# Patient Record
Sex: MALE | Race: WHITE | Employment: FULL TIME | URBAN - METROPOLITAN AREA
[De-identification: names, ages, dates, MRNs, and addresses within clinical notes are randomized per-mention and may not be internally consistent; named-entity substitution may affect disease eponyms.]

---

## 2017-02-14 ENCOUNTER — TELEPHONE (OUTPATIENT)
Dept: FAMILY MEDICINE CLINIC | Age: 52
End: 2017-02-14

## 2017-02-14 DIAGNOSIS — R91.1 NODULE OF RIGHT LUNG: Primary | ICD-10-CM

## 2017-03-30 ENCOUNTER — HOSPITAL ENCOUNTER (OUTPATIENT)
Dept: ULTRASOUND IMAGING | Age: 52
Discharge: HOME | End: 2017-03-30
Admitting: UROLOGY

## 2017-03-30 DIAGNOSIS — N20.1 CALCULUS OF URETER: ICD-10-CM

## 2017-05-16 ENCOUNTER — HOSPITAL ENCOUNTER (OUTPATIENT)
Dept: CT IMAGING | Age: 52
Discharge: HOME | End: 2017-05-16
Attending: FAMILY MEDICINE | Admitting: FAMILY MEDICINE

## 2017-05-16 DIAGNOSIS — R91.1 SOLITARY PULMONARY NODULE: ICD-10-CM

## 2017-05-16 DIAGNOSIS — R91.1 NODULE OF RIGHT LUNG: ICD-10-CM

## 2017-05-16 DIAGNOSIS — R91.1 NODULE OF RIGHT LUNG: Primary | ICD-10-CM

## 2017-05-19 ENCOUNTER — TELEPHONE (OUTPATIENT)
Dept: PULMONOLOGY | Age: 52
End: 2017-05-19

## 2017-05-19 ENCOUNTER — OFFICE VISIT (OUTPATIENT)
Dept: FAMILY MEDICINE CLINIC | Age: 52
End: 2017-05-19

## 2017-05-19 VITALS
HEIGHT: 70 IN | BODY MASS INDEX: 25.77 KG/M2 | WEIGHT: 180 LBS | SYSTOLIC BLOOD PRESSURE: 132 MMHG | HEART RATE: 88 BPM | DIASTOLIC BLOOD PRESSURE: 86 MMHG

## 2017-05-19 DIAGNOSIS — R91.8 PULMONARY NODULES/LESIONS, MULTIPLE: Primary | ICD-10-CM

## 2017-05-19 PROCEDURE — 99214 OFFICE O/P EST MOD 30 MIN: CPT | Performed by: FAMILY MEDICINE

## 2017-05-22 ENCOUNTER — TELEPHONE (OUTPATIENT)
Dept: FAMILY MEDICINE CLINIC | Age: 52
End: 2017-05-22

## 2017-05-23 ENCOUNTER — NURSE ONLY (OUTPATIENT)
Dept: FAMILY MEDICINE CLINIC | Age: 52
End: 2017-05-23

## 2017-05-23 ENCOUNTER — HOSPITAL ENCOUNTER (OUTPATIENT)
Dept: OTHER | Age: 52
Discharge: HOME | End: 2017-05-23
Attending: FAMILY MEDICINE | Admitting: FAMILY MEDICINE

## 2017-05-23 VITALS — HEIGHT: 68 IN | WEIGHT: 180 LBS | BODY MASS INDEX: 27.28 KG/M2

## 2017-05-23 DIAGNOSIS — R59.0 THORACIC LYMPHADENOPATHY: ICD-10-CM

## 2017-05-23 DIAGNOSIS — R91.1 NODULE OF RIGHT LUNG: ICD-10-CM

## 2017-05-23 DIAGNOSIS — R59.0 THORACIC LYMPHADENOPATHY: Primary | ICD-10-CM

## 2017-05-23 LAB
BASOPHILS ABSOLUTE: 0.1 K/UL (ref 0–0.2)
BASOPHILS RELATIVE PERCENT: 1.2 %
EOSINOPHILS ABSOLUTE: 0.1 K/UL (ref 0–0.6)
EOSINOPHILS RELATIVE PERCENT: 1.6 %
HCT VFR BLD CALC: 45.5 % (ref 40.5–52.5)
HEMOGLOBIN: 15 G/DL (ref 13.5–17.5)
LACTATE DEHYDROGENASE: 245 U/L (ref 100–190)
LYMPHOCYTES ABSOLUTE: 1.1 K/UL (ref 1–5.1)
LYMPHOCYTES RELATIVE PERCENT: 19 %
MCH RBC QN AUTO: 28.6 PG (ref 26–34)
MCHC RBC AUTO-ENTMCNC: 32.9 G/DL (ref 31–36)
MCV RBC AUTO: 86.8 FL (ref 80–100)
MONOCYTES ABSOLUTE: 0.5 K/UL (ref 0–1.3)
MONOCYTES RELATIVE PERCENT: 8.3 %
NEUTROPHILS ABSOLUTE: 4.1 K/UL (ref 1.7–7.7)
NEUTROPHILS RELATIVE PERCENT: 69.9 %
PDW BLD-RTO: 13.6 % (ref 12.4–15.4)
PLATELET # BLD: 243 K/UL (ref 135–450)
PMV BLD AUTO: 8.8 FL (ref 5–10.5)
RBC # BLD: 5.24 M/UL (ref 4.2–5.9)
SEDIMENTATION RATE, ERYTHROCYTE: 15 MM/HR (ref 0–15)
WBC # BLD: 5.9 K/UL (ref 4–11)

## 2017-05-23 RX ORDER — FLUDEOXYGLUCOSE F 18 200 MCI/ML
15.55 INJECTION, SOLUTION INTRAVENOUS
Status: COMPLETED | OUTPATIENT
Start: 2017-05-23 | End: 2017-05-23

## 2017-05-23 RX ADMIN — FLUDEOXYGLUCOSE F 18 15.55 MILLICURIE: 200 INJECTION, SOLUTION INTRAVENOUS at 08:55

## 2017-05-24 ENCOUNTER — OFFICE VISIT (OUTPATIENT)
Dept: PULMONOLOGY | Age: 52
End: 2017-05-24

## 2017-05-24 VITALS
HEIGHT: 68 IN | BODY MASS INDEX: 27.13 KG/M2 | RESPIRATION RATE: 16 BRPM | OXYGEN SATURATION: 94 % | WEIGHT: 179 LBS | SYSTOLIC BLOOD PRESSURE: 124 MMHG | DIASTOLIC BLOOD PRESSURE: 82 MMHG | TEMPERATURE: 98.2 F | HEART RATE: 83 BPM

## 2017-05-24 DIAGNOSIS — R59.0 MEDIASTINAL ADENOPATHY: ICD-10-CM

## 2017-05-24 DIAGNOSIS — R91.1 PULMONARY NODULE: Primary | ICD-10-CM

## 2017-05-24 PROCEDURE — 99245 OFF/OP CONSLTJ NEW/EST HI 55: CPT | Performed by: INTERNAL MEDICINE

## 2017-05-25 ENCOUNTER — SURG/PROC ORDERS (OUTPATIENT)
Dept: ANESTHESIOLOGY | Age: 52
End: 2017-05-25

## 2017-05-25 ENCOUNTER — PAT TELEPHONE (OUTPATIENT)
Dept: PREADMISSION TESTING | Age: 52
End: 2017-05-25

## 2017-05-25 VITALS — BODY MASS INDEX: 27.13 KG/M2 | HEIGHT: 68 IN | WEIGHT: 179 LBS

## 2017-05-25 LAB — ANGIOTENSIN CONVERTING ENZYME: 29 U/L (ref 9–67)

## 2017-05-25 RX ORDER — SODIUM CHLORIDE 0.9 % (FLUSH) 0.9 %
10 SYRINGE (ML) INJECTION EVERY 12 HOURS SCHEDULED
Status: CANCELLED | OUTPATIENT
Start: 2017-05-25

## 2017-05-25 RX ORDER — SODIUM CHLORIDE 0.9 % (FLUSH) 0.9 %
10 SYRINGE (ML) INJECTION PRN
Status: CANCELLED | OUTPATIENT
Start: 2017-05-25

## 2017-05-25 RX ORDER — SODIUM CHLORIDE 9 MG/ML
INJECTION, SOLUTION INTRAVENOUS CONTINUOUS
Status: CANCELLED | OUTPATIENT
Start: 2017-05-25

## 2017-05-26 ENCOUNTER — HOSPITAL ENCOUNTER (OUTPATIENT)
Dept: ENDOSCOPY | Age: 52
Discharge: HOME | End: 2017-05-26
Attending: INTERNAL MEDICINE | Admitting: INTERNAL MEDICINE

## 2017-05-26 VITALS
DIASTOLIC BLOOD PRESSURE: 83 MMHG | RESPIRATION RATE: 19 BRPM | WEIGHT: 174.49 LBS | OXYGEN SATURATION: 97 % | BODY MASS INDEX: 26.53 KG/M2 | HEART RATE: 79 BPM | TEMPERATURE: 96.9 F | SYSTOLIC BLOOD PRESSURE: 129 MMHG

## 2017-05-26 DIAGNOSIS — R59.0 MEDIASTINAL ADENOPATHY: ICD-10-CM

## 2017-05-26 LAB
ANION GAP SERPL CALCULATED.3IONS-SCNC: 14 MMOL/L (ref 3–16)
BUN BLDV-MCNC: 12 MG/DL (ref 7–20)
CALCIUM SERPL-MCNC: 9.1 MG/DL (ref 8.3–10.6)
CHLORIDE BLD-SCNC: 103 MMOL/L (ref 99–110)
CO2: 25 MMOL/L (ref 21–32)
CREAT SERPL-MCNC: 0.7 MG/DL (ref 0.9–1.3)
GFR AFRICAN AMERICAN: >60
GFR NON-AFRICAN AMERICAN: >60
GLUCOSE BLD-MCNC: 105 MG/DL (ref 70–99)
POTASSIUM SERPL-SCNC: 4.1 MMOL/L (ref 3.5–5.1)
SODIUM BLD-SCNC: 142 MMOL/L (ref 136–145)

## 2017-05-26 PROCEDURE — 31653 BRONCH EBUS SAMPLNG 3/> NODE: CPT | Performed by: INTERNAL MEDICINE

## 2017-05-26 PROCEDURE — 93010 ELECTROCARDIOGRAM REPORT: CPT | Performed by: INTERNAL MEDICINE

## 2017-05-26 RX ORDER — OXYCODONE HYDROCHLORIDE AND ACETAMINOPHEN 5; 325 MG/1; MG/1
2 TABLET ORAL PRN
Status: ACTIVE | OUTPATIENT
Start: 2017-05-26 | End: 2017-05-26

## 2017-05-26 RX ORDER — MEPERIDINE HYDROCHLORIDE 25 MG/ML
12.5 INJECTION INTRAMUSCULAR; INTRAVENOUS; SUBCUTANEOUS EVERY 5 MIN PRN
Status: DISCONTINUED | OUTPATIENT
Start: 2017-05-26 | End: 2017-05-27 | Stop reason: HOSPADM

## 2017-05-26 RX ORDER — ONDANSETRON 2 MG/ML
4 INJECTION INTRAMUSCULAR; INTRAVENOUS
Status: ACTIVE | OUTPATIENT
Start: 2017-05-26 | End: 2017-05-26

## 2017-05-26 RX ORDER — SODIUM CHLORIDE 9 MG/ML
INJECTION, SOLUTION INTRAVENOUS CONTINUOUS
Status: DISCONTINUED | OUTPATIENT
Start: 2017-05-26 | End: 2017-05-27 | Stop reason: HOSPADM

## 2017-05-26 RX ORDER — SODIUM CHLORIDE 0.9 % (FLUSH) 0.9 %
10 SYRINGE (ML) INJECTION EVERY 12 HOURS SCHEDULED
Status: DISCONTINUED | OUTPATIENT
Start: 2017-05-26 | End: 2017-05-27 | Stop reason: HOSPADM

## 2017-05-26 RX ORDER — FENTANYL CITRATE 50 UG/ML
50 INJECTION, SOLUTION INTRAMUSCULAR; INTRAVENOUS EVERY 5 MIN PRN
Status: DISCONTINUED | OUTPATIENT
Start: 2017-05-26 | End: 2017-05-27 | Stop reason: HOSPADM

## 2017-05-26 RX ORDER — OXYCODONE HYDROCHLORIDE AND ACETAMINOPHEN 5; 325 MG/1; MG/1
1 TABLET ORAL PRN
Status: ACTIVE | OUTPATIENT
Start: 2017-05-26 | End: 2017-05-26

## 2017-05-26 RX ORDER — MORPHINE SULFATE 2 MG/ML
2 INJECTION, SOLUTION INTRAMUSCULAR; INTRAVENOUS EVERY 5 MIN PRN
Status: DISCONTINUED | OUTPATIENT
Start: 2017-05-26 | End: 2017-05-27 | Stop reason: HOSPADM

## 2017-05-26 RX ORDER — MORPHINE SULFATE 2 MG/ML
1 INJECTION, SOLUTION INTRAMUSCULAR; INTRAVENOUS EVERY 5 MIN PRN
Status: DISCONTINUED | OUTPATIENT
Start: 2017-05-26 | End: 2017-05-27 | Stop reason: HOSPADM

## 2017-05-26 RX ORDER — FENTANYL CITRATE 50 UG/ML
25 INJECTION, SOLUTION INTRAMUSCULAR; INTRAVENOUS EVERY 5 MIN PRN
Status: DISCONTINUED | OUTPATIENT
Start: 2017-05-26 | End: 2017-05-27 | Stop reason: HOSPADM

## 2017-05-26 RX ORDER — SODIUM CHLORIDE 0.9 % (FLUSH) 0.9 %
10 SYRINGE (ML) INJECTION PRN
Status: DISCONTINUED | OUTPATIENT
Start: 2017-05-26 | End: 2017-05-27 | Stop reason: HOSPADM

## 2017-05-26 RX ADMIN — SODIUM CHLORIDE: 9 INJECTION, SOLUTION INTRAVENOUS at 07:56

## 2017-05-26 ASSESSMENT — PAIN SCALES - GENERAL
PAINLEVEL_OUTOF10: 0

## 2017-05-26 ASSESSMENT — ENCOUNTER SYMPTOMS: SHORTNESS OF BREATH: 0

## 2017-05-26 ASSESSMENT — PAIN - FUNCTIONAL ASSESSMENT: PAIN_FUNCTIONAL_ASSESSMENT: 0-10

## 2017-05-29 LAB
EKG ATRIAL RATE: 64 BPM
EKG DIAGNOSIS: NORMAL
EKG P AXIS: 1 DEGREES
EKG P-R INTERVAL: 150 MS
EKG Q-T INTERVAL: 422 MS
EKG QRS DURATION: 80 MS
EKG QTC CALCULATION (BAZETT): 435 MS
EKG R AXIS: -5 DEGREES
EKG T AXIS: 35 DEGREES
EKG VENTRICULAR RATE: 64 BPM

## 2017-05-31 DIAGNOSIS — R91.1 PULMONARY NODULE: Primary | ICD-10-CM

## 2017-05-31 LAB
ANAEROBIC CULTURE: NORMAL
CULTURE SURGICAL: NORMAL
GRAM STAIN RESULT: NORMAL

## 2017-08-29 ENCOUNTER — TELEPHONE (OUTPATIENT)
Dept: PULMONOLOGY | Age: 52
End: 2017-08-29

## 2017-09-27 ENCOUNTER — TELEPHONE (OUTPATIENT)
Dept: PULMONOLOGY | Age: 52
End: 2017-09-27

## 2017-11-06 ENCOUNTER — TELEPHONE (OUTPATIENT)
Dept: PULMONOLOGY | Age: 52
End: 2017-11-06

## 2018-02-16 ENCOUNTER — OFFICE VISIT (OUTPATIENT)
Dept: FAMILY MEDICINE CLINIC | Age: 53
End: 2018-02-16

## 2018-02-16 VITALS
TEMPERATURE: 98.2 F | SYSTOLIC BLOOD PRESSURE: 136 MMHG | HEART RATE: 64 BPM | BODY MASS INDEX: 29.42 KG/M2 | WEIGHT: 194.13 LBS | DIASTOLIC BLOOD PRESSURE: 84 MMHG | OXYGEN SATURATION: 98 % | HEIGHT: 68 IN

## 2018-02-16 DIAGNOSIS — M19.041 PRIMARY OSTEOARTHRITIS OF BOTH HANDS: Primary | ICD-10-CM

## 2018-02-16 DIAGNOSIS — M19.042 PRIMARY OSTEOARTHRITIS OF BOTH HANDS: Primary | ICD-10-CM

## 2018-02-16 LAB — RHEUMATOID FACTOR: <10 IU/ML

## 2018-02-16 PROCEDURE — 99213 OFFICE O/P EST LOW 20 MIN: CPT | Performed by: FAMILY MEDICINE

## 2018-02-16 ASSESSMENT — PATIENT HEALTH QUESTIONNAIRE - PHQ9
SUM OF ALL RESPONSES TO PHQ9 QUESTIONS 1 & 2: 0
2. FEELING DOWN, DEPRESSED OR HOPELESS: 0
1. LITTLE INTEREST OR PLEASURE IN DOING THINGS: 0
SUM OF ALL RESPONSES TO PHQ QUESTIONS 1-9: 0

## 2019-01-03 ENCOUNTER — TELEPHONE (OUTPATIENT)
Dept: FAMILY MEDICINE CLINIC | Age: 54
End: 2019-01-03

## 2019-01-04 ENCOUNTER — OFFICE VISIT (OUTPATIENT)
Dept: FAMILY MEDICINE CLINIC | Age: 54
End: 2019-01-04
Payer: COMMERCIAL

## 2019-01-04 VITALS
DIASTOLIC BLOOD PRESSURE: 88 MMHG | TEMPERATURE: 98.3 F | SYSTOLIC BLOOD PRESSURE: 138 MMHG | WEIGHT: 183.13 LBS | OXYGEN SATURATION: 97 % | BODY MASS INDEX: 27.76 KG/M2 | HEIGHT: 68 IN | HEART RATE: 77 BPM

## 2019-01-04 DIAGNOSIS — J01.90 ACUTE BACTERIAL SINUSITIS: Primary | ICD-10-CM

## 2019-01-04 DIAGNOSIS — B96.89 ACUTE BACTERIAL SINUSITIS: Primary | ICD-10-CM

## 2019-01-04 PROCEDURE — 99213 OFFICE O/P EST LOW 20 MIN: CPT | Performed by: FAMILY MEDICINE

## 2019-01-04 RX ORDER — AZITHROMYCIN 250 MG/1
TABLET, FILM COATED ORAL
Qty: 1 PACKET | Refills: 0 | Status: SHIPPED | OUTPATIENT
Start: 2019-01-04 | End: 2019-01-14

## 2019-01-04 RX ORDER — PREDNISONE 10 MG/1
10 TABLET ORAL 2 TIMES DAILY
Qty: 10 TABLET | Refills: 0 | Status: SHIPPED | OUTPATIENT
Start: 2019-01-04 | End: 2019-01-09

## 2019-03-25 ENCOUNTER — OFFICE VISIT (OUTPATIENT)
Dept: FAMILY MEDICINE CLINIC | Age: 54
End: 2019-03-25
Payer: COMMERCIAL

## 2019-03-25 VITALS
SYSTOLIC BLOOD PRESSURE: 134 MMHG | DIASTOLIC BLOOD PRESSURE: 78 MMHG | OXYGEN SATURATION: 97 % | HEART RATE: 71 BPM | BODY MASS INDEX: 27 KG/M2 | HEIGHT: 68 IN | WEIGHT: 178.13 LBS

## 2019-03-25 DIAGNOSIS — Z12.5 SCREENING FOR MALIGNANT NEOPLASM OF PROSTATE: ICD-10-CM

## 2019-03-25 DIAGNOSIS — Z00.00 ROUTINE GENERAL MEDICAL EXAMINATION AT A HEALTH CARE FACILITY: Primary | ICD-10-CM

## 2019-03-25 DIAGNOSIS — Z13.220 SCREENING, LIPID: ICD-10-CM

## 2019-03-25 DIAGNOSIS — I10 ESSENTIAL HYPERTENSION: ICD-10-CM

## 2019-03-25 DIAGNOSIS — Z12.11 SCREEN FOR COLON CANCER: ICD-10-CM

## 2019-03-25 LAB
ALBUMIN SERPL-MCNC: 4 G/DL (ref 3.4–5)
ANION GAP SERPL CALCULATED.3IONS-SCNC: 14 MMOL/L (ref 3–16)
BUN BLDV-MCNC: 17 MG/DL (ref 7–20)
CALCIUM SERPL-MCNC: 9.7 MG/DL (ref 8.3–10.6)
CHLORIDE BLD-SCNC: 102 MMOL/L (ref 99–110)
CHOLESTEROL, TOTAL: 208 MG/DL (ref 0–199)
CO2: 24 MMOL/L (ref 21–32)
CREAT SERPL-MCNC: 0.9 MG/DL (ref 0.9–1.3)
GFR AFRICAN AMERICAN: >60
GFR NON-AFRICAN AMERICAN: >60
GLUCOSE BLD-MCNC: 123 MG/DL (ref 70–99)
HDLC SERPL-MCNC: 66 MG/DL (ref 40–60)
LDL CHOLESTEROL CALCULATED: 121 MG/DL
PHOSPHORUS: 4 MG/DL (ref 2.5–4.9)
POTASSIUM SERPL-SCNC: 4.4 MMOL/L (ref 3.5–5.1)
PROSTATE SPECIFIC ANTIGEN: 0.2 NG/ML (ref 0–4)
SODIUM BLD-SCNC: 140 MMOL/L (ref 136–145)
TRIGL SERPL-MCNC: 106 MG/DL (ref 0–150)
VLDLC SERPL CALC-MCNC: 21 MG/DL

## 2019-03-25 PROCEDURE — 36415 COLL VENOUS BLD VENIPUNCTURE: CPT | Performed by: FAMILY MEDICINE

## 2019-03-25 PROCEDURE — 99396 PREV VISIT EST AGE 40-64: CPT | Performed by: FAMILY MEDICINE

## 2019-03-25 RX ORDER — AMLODIPINE BESYLATE 10 MG/1
10 TABLET ORAL DAILY
Qty: 90 TABLET | Refills: 3 | Status: SHIPPED | OUTPATIENT
Start: 2019-03-25 | End: 2020-05-04

## 2019-03-25 ASSESSMENT — PATIENT HEALTH QUESTIONNAIRE - PHQ9
SUM OF ALL RESPONSES TO PHQ QUESTIONS 1-9: 0
SUM OF ALL RESPONSES TO PHQ QUESTIONS 1-9: 0
2. FEELING DOWN, DEPRESSED OR HOPELESS: 0
SUM OF ALL RESPONSES TO PHQ9 QUESTIONS 1 & 2: 0
1. LITTLE INTEREST OR PLEASURE IN DOING THINGS: 0

## 2019-03-25 NOTE — PROGRESS NOTES
Chief Complaint   Patient presents with    Annual Exam     Family History   Problem Relation Age of Onset    Crohn's Disease Mother     Diabetes Father     Arthritis Other     Cancer Other     High Blood Pressure Other      Social History     Socioeconomic History    Marital status:      Spouse name: Not on file    Number of children: 3    Years of education: Not on file    Highest education level: Not on file   Occupational History    Occupation:    Social Needs    Financial resource strain: Not on file    Food insecurity:     Worry: Not on file     Inability: Not on file    Transportation needs:     Medical: Not on file     Non-medical: Not on file   Tobacco Use    Smoking status: Never Smoker    Smokeless tobacco: Never Used   Substance and Sexual Activity    Alcohol use:  Yes     Alcohol/week: 0.0 oz     Comment: socially    Drug use: No    Sexual activity: Yes     Partners: Female   Lifestyle    Physical activity:     Days per week: Not on file     Minutes per session: Not on file    Stress: Not on file   Relationships    Social connections:     Talks on phone: Not on file     Gets together: Not on file     Attends Holiness service: Not on file     Active member of club or organization: Not on file     Attends meetings of clubs or organizations: Not on file     Relationship status: Not on file    Intimate partner violence:     Fear of current or ex partner: Not on file     Emotionally abused: Not on file     Physically abused: Not on file     Forced sexual activity: Not on file   Other Topics Concern    Not on file   Social History Narrative    Not on file       Current Outpatient Medications:     amLODIPine (NORVASC) 10 MG tablet, Take 1 tablet by mouth daily, Disp: 90 tablet, Rfl: 3  Allergies   Allergen Reactions    Seasonal Other (See Comments)     Grass/mold/pollen--pt states does not take any medication for them       Patient Active Problem List Diagnosis    ADD (attention deficit disorder)    Prepatellar bursitis of right knee    Essential hypertension    Adjustment disorder with depressed mood    Arthritis of right knee    Nodule of right lung    Mediastinal adenopathy       HPI / ROS: Laura Polanco presents for evaluation and management of :    # preventive and other issues  # screen lipids  # screen prostate  # screen colon ca  # HTN rewilling to start amlodipne no CP/SOB        Objective   Wt Readings from Last 3 Encounters:   03/25/19 178 lb 2 oz (80.8 kg)   01/04/19 183 lb 2 oz (83.1 kg)   02/16/18 194 lb 2 oz (88.1 kg)       A&O  /78   Pulse 71   Ht 5' 8\" (1.727 m)   Wt 178 lb 2 oz (80.8 kg)   SpO2 97%   BMI 27.08 kg/m²   Eyes no scleral icterus  Skin no rash no jaundice  Neck no TMG no LAD  Car reg no MGR  Lungs CTA  abd benign soft  Ext no pitting edema  Psych: Judgement and insight are intact, no pressured speech; no psychomotor retardation or agitation; affect and mood congruent     Diagnosis Orders   1. Routine general medical examination at a health care facility     2. Screening, lipid  Lipid Panel   3. Screening for malignant neoplasm of prostate  Psa screening   4. Screen for colon cancer  COLOGUARD   5. Essential hypertension  Renal Function Panel     Orders Placed This Encounter   Procedures    COLOGUARD     This test is performed by an external laboratory and is used for result attachment only. It is required that this order requisition be faxed to: [facility] @@ [phone]. See [email or web address] for further information.      Standing Status:   Future     Standing Expiration Date:   3/25/2020    Lipid Panel     Order Specific Question:   Is Patient Fasting?/# of Hours     Answer:   yes    Psa screening    Renal Function Panel

## 2019-07-16 ENCOUNTER — TELEPHONE (OUTPATIENT)
Dept: FAMILY MEDICINE CLINIC | Age: 54
End: 2019-07-16

## 2019-12-02 ENCOUNTER — TELEPHONE (OUTPATIENT)
Dept: FAMILY MEDICINE CLINIC | Age: 54
End: 2019-12-02

## 2019-12-04 ENCOUNTER — TELEPHONE (OUTPATIENT)
Dept: FAMILY MEDICINE CLINIC | Age: 54
End: 2019-12-04

## 2019-12-04 RX ORDER — SILDENAFIL CITRATE 20 MG/1
20 TABLET ORAL DAILY PRN
Qty: 30 TABLET | Refills: 5 | Status: SHIPPED | OUTPATIENT
Start: 2019-12-04 | End: 2021-04-16 | Stop reason: SDUPTHER

## 2019-12-05 ENCOUNTER — TELEPHONE (OUTPATIENT)
Dept: FAMILY MEDICINE CLINIC | Age: 54
End: 2019-12-05

## 2019-12-05 NOTE — TELEPHONE ENCOUNTER
The PT would like top speak to the PCP regarding his script for sildenafil (REVATIO) 20 MG tablet   He says he has a few questions .

## 2020-02-13 ENCOUNTER — OFFICE VISIT (OUTPATIENT)
Dept: ORTHOPEDIC SURGERY | Age: 55
End: 2020-02-13
Payer: COMMERCIAL

## 2020-02-13 VITALS
HEART RATE: 72 BPM | WEIGHT: 180 LBS | HEIGHT: 68 IN | BODY MASS INDEX: 27.28 KG/M2 | DIASTOLIC BLOOD PRESSURE: 105 MMHG | SYSTOLIC BLOOD PRESSURE: 160 MMHG

## 2020-02-13 PROCEDURE — 99214 OFFICE O/P EST MOD 30 MIN: CPT | Performed by: ORTHOPAEDIC SURGERY

## 2020-02-13 ASSESSMENT — ENCOUNTER SYMPTOMS
ALLERGIC/IMMUNOLOGIC NEGATIVE: 1
GASTROINTESTINAL NEGATIVE: 1
RESPIRATORY NEGATIVE: 1
EYES NEGATIVE: 1

## 2020-03-02 ENCOUNTER — OFFICE VISIT (OUTPATIENT)
Dept: ORTHOPEDIC SURGERY | Age: 55
End: 2020-03-02
Payer: COMMERCIAL

## 2020-03-02 VITALS
SYSTOLIC BLOOD PRESSURE: 172 MMHG | HEIGHT: 68 IN | WEIGHT: 180 LBS | HEART RATE: 72 BPM | DIASTOLIC BLOOD PRESSURE: 106 MMHG | BODY MASS INDEX: 27.28 KG/M2

## 2020-03-02 PROCEDURE — 99213 OFFICE O/P EST LOW 20 MIN: CPT | Performed by: ORTHOPAEDIC SURGERY

## 2020-03-02 NOTE — PROGRESS NOTES
alteration deep to the anterior inferior glenoid labrum on axial PD water excitation series 6,    images 33 through 37 close.  A sublabral sulcus deep to the superior glenoid labrum is also    present.  There is no evidence of posteroinferior glenoid labral tear.       Mild subacromial/subdeltoid bursitis is present.       Moderate severity acromioclavicular osteoarthritis is present with superior spurs and multiple    adjacent subchondral cysts within the distal aspect of the clavicle adjacent to the    acromioclavicular joint.  Additionally, two peripherally corticated ossifications are present    along the superior aspect of the acromioclavicular joint compatible with intra-articular loose    bodies or chronic nonunited fracture fragments.  No high-grade outlet stenosis.       CONCLUSION:   1. Complete full-thickness insertional tear of the supraspinatus tendon with 2.5-3cm tendon    retraction. 2. Severe tendinosis and probable partial tearing of the distal infraspinatus tendon are    present with low-grade partial-thickness interstitial tearing of the proximal infraspinatus    tendon near the musculotendinous junction. 3. Mild to moderate severity insertional tendinosis of the subscapularis tendon. 4. Moderate severity acromioclavicular osteoarthritis with two small peripherally corticated    ossifications superior to the acromioclavicular joint compatible with intra-articular loose    bodies or chronic nonunited fracture fragments. 5. Mild glenohumeral osteoarthritis. 6. A probable chronic nondisplaced tear of the anterioroinferior glenoid labrum is present. A    sublabral sulcus deep to the superior glenoid labrum is also present. 7. Mild subacromial/subdeltoid bursitis. Assessment: Large full-thickness rotator cuff tear left shoulder. Plan: I am recommending surgical treatment. He is young and has a large tear.   I am fearful that significant delays in treatment will result in an

## 2020-03-04 ENCOUNTER — TELEPHONE (OUTPATIENT)
Dept: FAMILY MEDICINE CLINIC | Age: 55
End: 2020-03-04

## 2020-03-04 NOTE — TELEPHONE ENCOUNTER
Returning call.  Notified him to reach out to physician that performed the surgery as stated in message below

## 2020-04-27 ENCOUNTER — TELEPHONE (OUTPATIENT)
Dept: ORTHOPEDIC SURGERY | Age: 55
End: 2020-04-27

## 2020-04-27 ENCOUNTER — TELEPHONE (OUTPATIENT)
Dept: FAMILY MEDICINE CLINIC | Age: 55
End: 2020-04-27

## 2020-04-27 NOTE — TELEPHONE ENCOUNTER
PT called in stating that about 5 years ago he had a vasectomy and for undisclosed reasons PT said that he needs to verify if it worked or not. Pt wanted to know who he can see for this (he did not remember who performed the original procedure).      Please call PT back: [phone]

## 2020-04-27 NOTE — TELEPHONE ENCOUNTER
He had vase[facility] done by[facility] Dr[facility]. Dr. Lily Price has [facility] retired. He can contact [facility] as [phone] for semen analysis and explain who he saw before.

## 2020-04-30 ENCOUNTER — OFFICE VISIT (OUTPATIENT)
Dept: ORTHOPEDIC SURGERY | Age: 55
End: 2020-04-30
Payer: COMMERCIAL

## 2020-04-30 VITALS — HEIGHT: 68 IN | BODY MASS INDEX: 27.28 KG/M2 | WEIGHT: 180 LBS | TEMPERATURE: 97.2 F

## 2020-04-30 PROCEDURE — 99213 OFFICE O/P EST LOW 20 MIN: CPT | Performed by: ORTHOPAEDIC SURGERY

## 2020-04-30 RX ORDER — DUPILUMAB 300 MG/2ML
300 INJECTION, SOLUTION SUBCUTANEOUS WEEKLY
COMMUNITY
Start: 2020-04-27

## 2020-04-30 NOTE — PROGRESS NOTES
Nadya Chandler returns today to follow-up his left shoulder. He has a known large rotator cuff tear. I recommended surgery. His wife has completed the surgical treatment for breast cancer and is now starting radiation therapy. He states that he has no pain at rest but pain with sleep can be 6 out of 10. I have reviewed the patient's medical history in detail and updated the computerized patient record. General Exam:    Vitals: Temperature 97.2 °F (36.2 °C), temperature source Temporal, height 5' 8\" (1.727 m), weight 180 lb (81.6 kg). Constitutional: Patient is adequately groomed with no evidence of malnutrition  Mental Status: The patient is oriented to time, place and person. The patient's mood and affect are appropriate. Left shoulder has no better than 3+/5 strength in the supraspinatus and 4/5 in the infraspinatus but full strength in the subscap. He has no biceps or AC joint pain. Neurologic and vascular exams of left upper extremity are normal.     Left shoulder MRI is reviewed. It demonstrates:      FINDINGS:  A complete full-thickness insertional tear of the supraspinatus tendon is present    with 2.5-3cm of tendon retraction (coronal PD fat-sat series 8, images 7 through 12).  Only    minimal fatty infiltration of the supraspinatus muscle is present.       Severe tendinosis and probable partial tearing of the infraspinatus tendon insertion are    present with low-grade (less than 1/3 thickness)partial-thickness interstitial tearing of the    proximal infraspinatus tendon near the musculotendinous junction (sagittal T2 fat-sat series 7,    images 10 and 11).        Mild to moderate severity insertional tendinosis of the subscapularis tendon is present without    macrotear.       The teres minor tendon is intact with no evidence of tendinosis or tendon tear.  The long head    of the biceps tendon is intact.       Mild glenohumeral osteoarthritis is present with small spurs shoulder.     Plan: I am recommending surgical treatment. He is young and has a large tear. I am fearful that significant delays in treatment will result in an irreparable tear. We discussed his postoperative care including 6 weeks in a sling. I am concerned that if he waits too long he may require allograft augmentation.         We reviewed the risks, benefits, and alternatives to surgery. The alternatives include conservative management including medications, injections, and physical therapy as well as observation. Risks of surgery include but are not limited to persistent pain, instability, and reinjury. Risks also include risk of infection which could result in the need for further surgery and long-term use of antibiotics. Risks also include deep venous thromboses and pulmonary emboli. Risks also include problems with anesthesia including but not limited to cardiovascular compromise , stroke,  and death. The patient understands that the goal of surgery is to improve pain and function but that can never be guaranteed. Unfortunately, at this time, he says he needs to delay surgery until at least October. He understands that this can complicate his recovery but I will plan on seeing him back in September or October. Face-to-face visit time today including counseling was at least 15 minutes. This note was created using voice recognition software. It has been proofread, but occasionally errors remain. Please disregard these errors. They will be corrected as they are noted.

## 2020-05-04 RX ORDER — AMLODIPINE BESYLATE 10 MG/1
TABLET ORAL
Qty: 90 TABLET | Refills: 3 | Status: SHIPPED | OUTPATIENT
Start: 2020-05-04 | End: 2020-12-16 | Stop reason: SDUPTHER

## 2020-07-24 ENCOUNTER — TELEPHONE (OUTPATIENT)
Dept: ORTHOPEDIC SURGERY | Age: 55
End: 2020-07-24

## 2020-07-24 NOTE — TELEPHONE ENCOUNTER
Patient would like to be scheduled with Dr Librado Landry. Patient called the first time complaining of right hip,groin and knee pain. Patient stated he had a TKR done by Dr Janine Adame.  I scheduled him with Dr Tony Tolbert but patient states he would like to see Dr Librado Landry.  [phone]

## 2020-07-29 ENCOUNTER — OFFICE VISIT (OUTPATIENT)
Dept: ORTHOPEDIC SURGERY | Age: 55
End: 2020-07-29
Payer: COMMERCIAL

## 2020-07-29 VITALS — HEIGHT: 68 IN | WEIGHT: 180 LBS | TEMPERATURE: 97.3 F | BODY MASS INDEX: 27.28 KG/M2

## 2020-07-29 PROCEDURE — 20611 DRAIN/INJ JOINT/BURSA W/US: CPT | Performed by: ORTHOPAEDIC SURGERY

## 2020-07-29 PROCEDURE — 99203 OFFICE O/P NEW LOW 30 MIN: CPT | Performed by: ORTHOPAEDIC SURGERY

## 2020-07-29 RX ORDER — LIDOCAINE HYDROCHLORIDE 10 MG/ML
5 INJECTION, SOLUTION INFILTRATION; PERINEURAL ONCE
Status: COMPLETED | OUTPATIENT
Start: 2020-07-29 | End: 2020-07-29

## 2020-07-29 RX ORDER — METHYLPREDNISOLONE ACETATE 40 MG/ML
80 INJECTION, SUSPENSION INTRA-ARTICULAR; INTRALESIONAL; INTRAMUSCULAR; SOFT TISSUE ONCE
Status: COMPLETED | OUTPATIENT
Start: 2020-07-29 | End: 2020-07-29

## 2020-07-29 RX ADMIN — METHYLPREDNISOLONE ACETATE 80 MG: 40 INJECTION, SUSPENSION INTRA-ARTICULAR; INTRALESIONAL; INTRAMUSCULAR; SOFT TISSUE at 16:30

## 2020-07-29 RX ADMIN — LIDOCAINE HYDROCHLORIDE 5 ML: 10 INJECTION, SOLUTION INFILTRATION; PERINEURAL at 16:29

## 2020-07-29 NOTE — PROGRESS NOTES
ChloraPrep. A sterile cover was placed over the transducer head and the femoral head neck junction once again visualized. A 20-gauge spinal needle was then introduced under ultrasound control to the head neck junction. Once the needle was intracapsular the hip joint was injected with 2 mL  of 1% Lidocaine mixed with 2 ml of 40 mg Depo Medrol. Thuan tolerated the procedure well and  did report 10 to 20% relief of  hip pain within 5 minutes of the injection. 3.  Prognosis was discussed. He was told that he likely will need a right hip replacement. 4.  He is referred to Dr. Khoi Seaman for further discussion. Medardo Watson MD  7/29/2020      This dictation was done with Dragon dictation and may contain mechanical errors related to translation.

## 2020-08-27 ENCOUNTER — PREP FOR PROCEDURE (OUTPATIENT)
Dept: ORTHOPEDIC SURGERY | Age: 55
End: 2020-08-27

## 2020-08-27 ENCOUNTER — OFFICE VISIT (OUTPATIENT)
Dept: ORTHOPEDIC SURGERY | Age: 55
End: 2020-08-27
Payer: COMMERCIAL

## 2020-08-27 VITALS — TEMPERATURE: 97.7 F

## 2020-08-27 PROCEDURE — 99214 OFFICE O/P EST MOD 30 MIN: CPT | Performed by: ORTHOPAEDIC SURGERY

## 2020-08-27 NOTE — LETTER
HEIGHT:   5'8             WEIGHT:    180     ALLERGIES:Seasonal      SURG    11-4              JET       10-19                       ________________________________________________________________  PRE-OP ORDERS:  ? CBC WITH DIFFERENTIAL                                                ? TYPE AND SCREEN   ? VITAMIN D LEVELS  ? PREALBUMIN AND ALBUMIN LEVELS                                                           ? HgB A1C                                                                               ? EKG                                                                                        ? NASAL CULUTRE MRSA  ? UAR/if positive repeat UAR on admissioin  ? BMP  ? COAG PROFILE  ? SED RATE  ? PT/OT EVAL AND TEACHING  ? INSTRUCT PT TO STOP ALL NSAIDS, ASPIRIN, BLOOD THINNERS 7 DAYS PRIOR SURGERY  DAY OF SURGERY  ? CEFAZOLIN 2 GM IVPB; IF PATIENT WEIGHS > 80 KG AND SERUM CREATININE <2.5 mg/dl, GIVE 2 GM DOSE WITHIN 1 HOUR OF INCISION. ? IF THE PRE-OP NASAL CULTURE FOR MRSA WAS POSITIVE:   REPEAT NASAL SWAB ON ADMISSION AND ADMINISTER VANCOMYCIN 1 GM IVPB, REDUCE THE DOSE OF VANCOMYCIN  MG IVPB IF PT < 55 KG OR SERUM CREATININE > 2mg/dl; Also to get 2 GM Cefazolin or wt based  ? All patients will receive preop Cefazolin 2 GM or wt based  ? APPLY KNEE HIGH ANTI-EMBOLIC AND PNEUMO-BOOTS TO UNOPERATIVE  LEG  ? CELEBREX  200 MG  ORALLY  DAY OF SURGERY  ? ROXICODONE 10MG  ORALLY DAY OF SURGERY    OTHER ORDERS:  ____________________________________________________________  PHYSICIAN SIGNATURE: 8/27/2020 5:18 PM   __________________________DATE:                 Supplemental Confidentiality & Payment Agreement & Supplemental HIPAA Notice for Shared Medical Appointments        During shared medical appointments, you will hear about other participants health issues and personal information.   As a matter of trust, it is your duty to keep everything you hear confidential.  Nothing that identifies a participant in any way (including job, ethnicity, [employer], etc.) can be shared outside of this group setting. I have read and agree to the following statements:        · I agree to meet with a group of patients and my doctor. I understand that I have the choice to be seen by my physician in this group or individually and that I may leave the group visit anytime I feel uncomfortable. · I understand that discussions will occur regarding individual identifiable health information during the shared medical appointment and I will maintain the confidentiality of [facility] health information or other information heard during the appointment. · I am committed to maintaining this confidentiality even if I am no longer participating in shared medical appointments. · I understand that the information I share with the physician and staff will be heard by the other participants and there is a possibility that the information disclosed in the shared medical appointment may be re-disclosed by other participants. I have been notified of this potential disclosure and I voluntarily agree to participate in the shared medical appointment. · I know that I dont have to share any personal information with the group or health care providers unless, I choose to do so. · Like any doctors appointment, I agree to be responsible for the bill and / or co-payment associated with this physician appointment. Shared Medical Appointment              THIS SUPPLEMENTAL NOTICE SUPPLEMENTS AND DOES NOT REPLACE THE [facility] CONSENT, PATIENT RESPONSIBILITY AND NOTICE OF PRIVACY PRACTICE.         Desmond Moncada    1965        Patients Signature: ____________________________________________________         DATE: ____/____/___      Renan Nathan / Support Persons Signature (if applicable) _________________________     DATE: __/__/__

## 2020-08-27 NOTE — PROGRESS NOTES
This dictation was done with [employer] dictation and may contain mechanical errors related to translation.   Temperature 97.7 °F (36.5 °C), temperature source Temporal.

## 2020-09-01 NOTE — TELEPHONE ENCOUNTER
Pt called; has questions about his hip pain. Will like to know if there are any suggestions for the pain. Please advise.      Call [phone] to discuss

## 2020-09-02 RX ORDER — NAPROXEN 500 MG/1
500 TABLET ORAL 2 TIMES DAILY WITH MEALS
Qty: 60 TABLET | Refills: 0 | Status: ON HOLD | OUTPATIENT
Start: 2020-09-02 | End: 2020-11-04 | Stop reason: SDUPTHER

## 2020-09-02 RX ORDER — CYCLOBENZAPRINE HCL 10 MG
10 TABLET ORAL 3 TIMES DAILY PRN
Qty: 90 TABLET | Refills: 0 | Status: SHIPPED | OUTPATIENT
Start: 2020-09-02 | End: 2020-10-02

## 2020-09-02 NOTE — PROGRESS NOTES
Patient is a 81-year-old male we follow for right degenerative osteoarthritis. He continues to have significant groin and buttock pain. He is status post a right total knee arthroplasty performed by myself 4 years ago in December 2016. Patient states he is doing reasonably well with this however does have some knee pain. I believe that some of this may be coming from his highly arthritic degenerative right hip. Patient complains of pain and rates it up to 6 out of 10. He is now having difficulty with sleeping. Patient has no history of injury or surgery to the right hip. Patient does not smoke does not have diabetes does not take narcotics or any other hormone replacement medications. Patient has no history of DVT or pulmonary embolism states he has no metal allergies back problems dental problems or any other psychiatric illnesses. Patient does have very positive family history of degenerative osteoarthritis. The patient underwent an intra-articular injection on 7/29/2020 which gave him significant relief for about 2 weeks. He is now here to discuss possible total hip arthroplasty. Patient Active Problem List   Diagnosis    ADD (attention deficit disorder)    Prepatellar bursitis of right knee    Essential hypertension    Adjustment disorder with depressed mood    Arthritis of right knee    Nodule of right lung    Mediastinal adenopathy     Prior to Visit Medications    Medication Sig Taking? Authorizing Provider   amLODIPine (NORVASC) 10 MG tablet TAKE ONE TABLET BY MOUTH DAILY  Michelle Altman MD   DUPIXENT 300 MG/2ML SOSY injection   Historical Provider, MD   sildenafil (REVATIO) 20 MG tablet Take 1 tablet by mouth daily as needed (sexual activity)  Patient taking differently: Take 20 mg by mouth as needed (sexual activity)   Michelle Altman MD     Physical examination 81-year-old male oriented x3 temperature is 97.7.   Examination of his back shows good range of motion no specific pain tenderness or instability. Motor exam to the right lower extremity shows quadriceps hamstrings hip abductors abductors foot plantar dorsiflexors are intact 4-5 over 5. Sensation and perfusion are intact to the right foot and ankle. There is no numbness or tingling noted in the right lower extremity. No other obvious cutaneous lesions lymphedema or cellulitic processes are noted in the right lower extremity. Deep tendon reflexes are 1 at the knee and 0 at the ankle of the right lower extremity. Examination of the right hip shows significant loss of range of motion pain at high degrees of flexion and external rotation. No signs of instability or deep sepsis are noted in the right hip. X-rays in the past show advanced degenerative osteoarthritis of the right hip. Impression 68-year-old male with significant degenerative osteoarthritis of the right hip. This patient wants to move towards hip arthroplasty. We will need to wait till after October 29 of 2020 due to his cortisone injection just recently received. We had a 35 minute face-to-face discussion of which greater than 50% of the time was spent in counseling with this patient concerning right total hip arthroplasty it's preoperative evaluation and its postoperative pain management and follow-up. We went over the surgical procedure risks and complications including bleeding, infection,  neurovascular injury, decreased range of motion, persistent pain, instability with possible dislocation or fracture, DVT, pulmonary embolism, leg length inequality, change in mental status, and need for further surgical procedures. The patient understands this and we have answered all of their questions and concerns. The patient was counseled at length about the risks of connor Covid-19 during their perioperative period and any recovery window from their procedure.   The patient was made aware that ocnnor Covid-19  may worsen their prognosis for

## 2020-09-14 ENCOUNTER — HOSPITAL ENCOUNTER (OUTPATIENT)
Dept: PHYSICAL THERAPY | Age: 55
Setting detail: THERAPIES SERIES
Discharge: HOME | End: 2020-09-14
Payer: COMMERCIAL

## 2020-09-14 PROCEDURE — 97161 PT EVAL LOW COMPLEX 20 MIN: CPT

## 2020-09-14 PROCEDURE — 97110 THERAPEUTIC EXERCISES: CPT

## 2020-09-14 NOTE — FLOWSHEET NOTE
Home Exercise Program:  Patient instructed in the above exercises for HEP. Patient verbalized/demonstrated understanding and was issued written handout. Also reviewed MD precautions following surgery with patient. Charges: Therapeutic Exercise:  [x] (65364) Provided verbal/tactile cueing for activities to restore or maintain strength, flexibility, endurance, ROM for improvements with self-care, mobility, lifting and ambulation. Neuromuscular Re-Education  [] (55773) Provided verbal/tactile cueing for activities to restore or maintain balance, coordination, kinesthetic sense, posture, motor skill, proprioception for self-care, mobility, lifting,   and ambulation. Therapeutic Activities:    [] (00598) Provided verbal/tactile cueing to address functional limitations related to loss of mobility, strength, balance, and coordination. Gait Training:  [] (53614) Provided training and instruction to the patient for proper postural muscle recruitment and positioning with ambulation re-education     Home Exercise Program:    [x] (72657) Reviewed/Progressed HEP activities related to strengthening, flexibility, endurance, ROM for functional self-care, mobility, lifting and ambulation   [] (76154) Reviewed/Progressed HEP activities related to improving balance, coordination, kinesthetic sense, posture, motor skill, proprioception for self-care, mobility, lifting, and ambulation      Manual Treatments:  MFR / STM / Oscillations-Mobs:  G-I, II, III, IV / Manipulation / MLD  [] (36387) Provided manual therapy to mobilize  soft tissue/joints/fluid for the purpose of modulating pain, promoting relaxation, increasing ROM, reducing/eliminating soft tissue swelling/inflammation/restriction, improving soft tissue extensibility and allowing for proper ROM for normal function with self- care, mobility, lifting and ambulation.       Timed Code Treatment Minutes: 25   Total Treatment Minutes: 45     [x] DWIGHT (LOW) 80941 [] EVAL (MOD) 06902   [] EVAL (HIGH) 81164   [] RE-EVAL   [x] TE (53038) x 2    [] Aquatic (25617) x  [] NMR (17529)   x  [] Aquatic Group (92689) x  [] Manual (02969) x    [] Ultrasound (90907) x  [] TA (93829) x  [] Mech Traction (51779)  [] Ionto (66971)          [] ES (un) (87248):   [] Vasopump (07666) [] Other:        Assessment:  [] Patient tolerated treatment well     [] Patient limited by fatigue  [x] Patient limited by pain                   [] Patient limited by other medical complications  [] Other:      Prognosis:     [x] Good          [] Fair             [] Poor    Goals:    Short term goals  Time Frame for Short term goals: 1 visit  Short term goal 1: Patient will demonstrate/verbalize understanding of HEP for preparation of upcoming THR surgery. Patient Requires Follow-up: [] Yes  [x] No    Plan:   [] Continue per plan of care [] Alter current plan (see comments)  [] Plan of care initiated [] Hold pending MD visit [x] Discharge    Plan for Next Session:  Patient seen for 1 visit for HEP. Patient is a good candidate for THR surgery and would benefit from outpatient rehab following surgery.      Electronically signed by:  Kathleen Chau, [address] [facility], [place], [id number]

## 2020-09-14 NOTE — PLAN OF CARE
Outpatient Physical Therapy  [x] [facility]    Phone: [phone]   Fax: [phone]   [] [facility]  Phone: [phone]              Fax: [phone]  [] [facility]   Phone: [phone]   Fax: [phone]     To: Referring Practitioner: Nataly Real MD      Patient: Myrna Loja   : 1965   MRN: [id number]  Evaluation Date: 2020      Diagnosis Information:  · Diagnosis: primary osteoarthritis of R hip   · Treatment Diagnosis: decreased mobility, strength     Physical Therapy Certification/Re-Certification Form  Dear Dr. Thelma Zuniga,   The following patient has been evaluated for physical therapy services and for therapy to continue, Medicare requires monthly physician review of the treatment plan. Please review the attached evaluation and/or summary of the patient's plan of care, and verify that you agree therapy should continue by signing the attached document and sending it back to our office. Plan of Care/Treatment to date:  [x] Therapeutic Exercise    [] Modalities:  [x] Therapeutic Activity     [] Ultrasound  [] Electrical Stimulation  [] Gait Training      [] Cervical Traction [] Lumbar Traction  [] Neuromuscular Re-education    [] Cold/hotpack [] Iontophoresis   [x] Instruction in HEP     Other:  [] Manual Therapy      []             [] Aquatic Therapy      []           ? Frequency/Duration:  # Days per week: [x] 1 day # Weeks: [x] 1 week [] 5 weeks     [] 2 days? [] 2 weeks [] 6 weeks     [] 3 days   [] 3 weeks [] 7 weeks     [] 4 days   [] 4 weeks [] 8 weeks    Rehab Potential: [x] Excellent [] Good [] Fair  [] Poor     Patient seen for 1 prehab visit and HEP instruction. Patient good to continue on own with HEP at this time. Electronically signed by:  Faisal Tavares PT , OMT-C,  [id number]      If you have any questions or concerns, please don't hesitate to call.   Thank you for your referral.      Physician Signature:________________________________Date:__________________  By signing above, therapists plan is approved by physician

## 2020-09-14 NOTE — PROGRESS NOTES
Physical Therapy  Initial Assessment  Date: 2020  Patient Name: Aguilar Holliday  MRN: [id number]  : 1965     Treatment Diagnosis: decreased mobility, strength    Restrictions  Restrictions/Precautions  Restrictions/Precautions: Fall Risk(low: pt reports no history of falls)    Subjective   General  Chart Reviewed: Yes  Patient assessed for rehabilitation services?: Yes  Additional Pertinent Hx: R TKR, cervical surgery, HTN, asthma  Referring Practitioner: Cecily Lefort, MD  Referral Date : 20  Diagnosis: primary osteoarthritis of R hip  PT Visit Information  Onset Date: (May 2020)  PT Insurance Information: [employer]  Subjective  Subjective: Patient reports onset of R hip pain after doing some yardwork all weekend in May of 2020. Patient followed up with ortho who did xrays. Per pt xrays showed severe arthritis which required THR. Patient is scheduled for R THR on 20. Social/Functional History  Social/Functional History  Lives With: Spouse;Daughter  Type of Home: House  Home Layout: Multi-level;Bed/Bath upstairs  Home Access: Stairs to enter with rails  Entrance Stairs - Number of Steps: 7 from the basement to the front landing and then 8 to the upstairs  Entrance Stairs - Rails: Right  Bathroom Shower/Tub: Walk-in shower  Bathroom Toilet: Standard  Home Equipment: Crutches  ADL Assistance: Independent  Homemaking Assistance: Independent  Active : Yes  Mode of Transportation: Truck  Occupation: Full time employment  Type of occupation:     Objective     Observation/Palpation  Posture: Fair(L knee genu varum noted.)  Palpation: no TTP noted  Observation: patient ambulates with mild antalgic on R LE.     PROM RLE (degrees)  RLE PROM: Exceptions  R Hip Flexion 0-125: 110  R Hip Extension 0-10: 5  R Hip External Rotation 0-45: min decreased  R Hip Internal Rotation 0-45: neutral  R Knee Flexion 0-145: 0-125    Strength RLE  Strength RLE: WFL  Strength LLE  Strength LLE: [facility] Additional Measures  Flexibility: B hamstrings=mod tightness, R hip flexor=min tightness  Sensation  Overall Sensation Status: WNL    Assessment   Conditions Requiring Skilled Therapeutic Intervention  Body structures, Functions, Activity limitations: Decreased functional mobility ; Increased pain;Decreased ROM; Decreased high-level IADLs  Assessment: Patient reports to PT with s/s consistent with R hip OA which would benefit from HEP to help prepare for upcoming R THR. (Prior Level of Function: able to work without pain)  Treatment Diagnosis: decreased mobility, strength  Prognosis: Excellent  Decision Making: Low Complexity  REQUIRES PT FOLLOW UP: No  Activity Tolerance  Activity Tolerance: Patient Tolerated treatment well         Plan   Plan  Times per week: 1 visit  Plan weeks: 1 week  Current Treatment Recommendations: Home Exercise Program    OutComes Score  LEFS Total Score: 64 (09/14/20 1822)        Goals  Short term goals  Time Frame for Short term goals: 1 visit  Short term goal 1: Patient will demonstrate/verbalize understanding of HEP for preparation of upcoming THR surgery. Patient Goals   Patient goals :  \"Get exercises to help prepare for upcoming surgery\"       Therapy Time   Individual   Time In 1730   Time Out 1815   Minutes 45   Timed Code Treatment Minutes: [address]allo, [address] [facility] , [facility],  [id number]

## 2020-09-14 NOTE — PROGRESS NOTES
TOTAL JOINT - PREHAB ASSESSMENT FORM    Date:  2020    Patient Name:  Antwan Dale    :  1965  AVW:[id number]    Restrictions/Precautions: Restrictions/Precautions  Restrictions/Precautions: Fall Risk(low: pt reports no history of falls)    Pertinent Medical History: Additional Pertinent Hx: R TKR, cervical surgery, HTN, asthma    Medical/Treatment Diagnosis Information:  · Diagnosis: primary osteoarthritis of R hip  · Treatment Diagnosis: decreased mobility, strength    Insurance/Certification information:  PT Insurance Information: [employer]  Physician Information:  Referring Practitioner: Bartolome Tillman MD  Hospital: [facility]    [x] Total Hip Replacement [x] Right  [] Left  DOS: 20  [] Not yet scheduled  [] Total Knee Replacement [] Right  [] Left    [x] Prehab Eval  [] Prehab D/C  [] Post - OP Visit             Weeks from sx [] Post-op D/C    DME ASSESSMENT:   Current available equipment:    [] Std. Charla Moellers       [] Rolling walker      [] 4 wheeled walker     [] Marleta Amass     [] Straight cane     [x] Crutches   [] Reacher            [] Sock Aid              [] Shower chair            [] Leg           [] Long handle shoe horn   [] Other:     Equipment needed at discharge from hospital:   [] Std. Walker       [x] Rolling walker      [] 4 wheeled walker     [] Marleta Amass     [x] Straight cane     [] Crutches   [x] Reacher            [x] Sock Aid              [x] Shower chair            [x] Leg           [x] Long handle shoe horn   [] Other:       SOCIAL ASSESSMENT:  1. Will you live with someone who can care for you after surgery? [x] Yes  [] No  2. Where is the bathroom located in your post-surgery place of residence? [] 1st Floor [x] 2nd Floor  3. Where is the bedroom located in your post-surgery place of residence? [] 1st Floor [x] 2nd Floor  4. Do you use community supports (home help, meals-on-wheels, district nurse)? [x] None or 1 per week  [] 2 or more per week  5. How many stairs will you have to climb to get in to your place of residence? [] Less than 5  [x] More than 5  6. Have you had a fall in the past year? [x] Yes  [] No     Notes:     Available PT Visits:  40 ( has used 1)    BMI:    Height 5'8\"   Weight 180#     FUNCTIONAL ASSESSMENT:   1. Do you use ambulatory aids? [x] None [] Single Point Cane  [] Crutches/Walker/Wheelchair  2. How far on average can you walk? [x] 2 Blocks or more  [] 1-2 Blocks  [] House bound most of the time  3. What is your physical activity level? [x] Highly Active [] Active  [] Somewhat active  [] Sedentary     Knee AROM (degrees) R L   Flexion (in supine) 125 120   Extension (use \"-\" to denote deficit) (long sitting, resting) 0 0     MMT (lbs) R L   Knee Extension (peak in seated) 65# 70#   Hip Abduction (peak in side lying) 30# 45#     Functional Testing (shoes on) Results   Timed Up and Go (TUG)                  (rounded seconds) 4.36 secs   30 Second Sit-Stand Test                  (completed repetitions) 15 reps   6 Minute Walk Test  (meters) 437.39 meters     Standing Balance  (shoes on, rounded to the nearest seconds, 10 second max)     1. Stand with your feet side by side:   Time: 10          (sec)  2. Place the instep of one foot so it      Is touching the big toe of the surgical Time: 10      Foot (surgical foot in back)     (sec)     3. Place surgical foot behind so the toes  Time:  10      Are touching the heal of the other foot.   (sec)    4.  Stand on surgical foot   Time: 10          (sec)       EXPECTED DISCHARGE DISPOSITION:                      [] Home no PT  [x] Home w/ OP PT                                         **If Discharge Disposition is to a facility, please indicate reason**  [] Home w/ [address] [facility]                               [] Lack of home support                   [] Medical Comorbidities  [] SNF/Inpatient Rehab                                          [] Transportation [] Other        Signature:  William Frank , OMT-C,  [id number]

## 2020-10-14 ENCOUNTER — TELEPHONE (OUTPATIENT)
Dept: ORTHOPEDIC SURGERY | Age: 55
End: 2020-10-14

## 2020-10-14 NOTE — TELEPHONE ENCOUNTER
Prescription Refill     Medication Name:  Ronan bolanos stronger pain med  Pharmacy: [facility] [phone]  Patient Contact Number:  [phone]

## 2020-10-16 ENCOUNTER — TELEPHONE (OUTPATIENT)
Dept: ORTHOPEDIC SURGERY | Age: 55
End: 2020-10-16

## 2020-10-16 NOTE — TELEPHONE ENCOUNTER
General Question     Subject: patient states his pain is unbearable and he can hardly walk.  Requesting a call to discuss  Patient and /or Facility Request:  Reymundo Bird  Contact Number: [phone]

## 2020-10-16 NOTE — TELEPHONE ENCOUNTER
CPT: 30350  AUTHORIZATION: approved  BODY PART: right hip    Approved outpatient with observation [id number]

## 2020-10-19 ENCOUNTER — HOSPITAL ENCOUNTER (OUTPATIENT)
Dept: PREADMISSION TESTING | Age: 55
Discharge: HOME | End: 2020-10-23
Payer: COMMERCIAL

## 2020-10-19 ENCOUNTER — OFFICE VISIT (OUTPATIENT)
Dept: FAMILY MEDICINE CLINIC | Age: 55
End: 2020-10-19
Payer: COMMERCIAL

## 2020-10-19 VITALS
HEART RATE: 79 BPM | DIASTOLIC BLOOD PRESSURE: 90 MMHG | WEIGHT: 185.8 LBS | RESPIRATION RATE: 15 BRPM | SYSTOLIC BLOOD PRESSURE: 150 MMHG | HEIGHT: 68 IN | BODY MASS INDEX: 28.16 KG/M2 | OXYGEN SATURATION: 98 %

## 2020-10-19 DIAGNOSIS — Z01.818 PREOP EXAMINATION: ICD-10-CM

## 2020-10-19 LAB
ABO/RH: NORMAL
ALBUMIN SERPL-MCNC: 4.2 G/DL (ref 3.4–5)
ANION GAP SERPL CALCULATED.3IONS-SCNC: 10 MMOL/L (ref 3–16)
ANTIBODY SCREEN: NORMAL
APTT: 31.2 SEC (ref 24.2–36.2)
BASOPHILS ABSOLUTE: 0.1 K/UL (ref 0–0.2)
BASOPHILS RELATIVE PERCENT: 1.1 %
BILIRUBIN URINE: NEGATIVE
BLOOD, URINE: NEGATIVE
BUN BLDV-MCNC: 10 MG/DL (ref 7–20)
CALCIUM SERPL-MCNC: 9.5 MG/DL (ref 8.3–10.6)
CHLORIDE BLD-SCNC: 102 MMOL/L (ref 99–110)
CLARITY: CLEAR
CO2: 27 MMOL/L (ref 21–32)
COLOR: YELLOW
CREAT SERPL-MCNC: 0.9 MG/DL (ref 0.9–1.3)
EKG ATRIAL RATE: 62 BPM
EKG DIAGNOSIS: NORMAL
EKG P AXIS: 2 DEGREES
EKG P-R INTERVAL: 156 MS
EKG Q-T INTERVAL: 418 MS
EKG QRS DURATION: 82 MS
EKG QTC CALCULATION (BAZETT): 424 MS
EKG R AXIS: -6 DEGREES
EKG T AXIS: 45 DEGREES
EKG VENTRICULAR RATE: 62 BPM
EOSINOPHILS ABSOLUTE: 0.2 K/UL (ref 0–0.6)
EOSINOPHILS RELATIVE PERCENT: 3.1 %
ESTIMATED AVERAGE GLUCOSE: 128.4 MG/DL
GFR AFRICAN AMERICAN: >60
GFR NON-AFRICAN AMERICAN: >60
GLUCOSE BLD-MCNC: 99 MG/DL (ref 70–99)
GLUCOSE URINE: NEGATIVE MG/DL
HBA1C MFR BLD: 6.1 %
HCT VFR BLD CALC: 44.5 % (ref 40.5–52.5)
HEMOGLOBIN: 15 G/DL (ref 13.5–17.5)
INR BLD: 0.97 (ref 0.86–1.14)
KETONES, URINE: NEGATIVE MG/DL
LEUKOCYTE ESTERASE, URINE: NEGATIVE
LYMPHOCYTES ABSOLUTE: 1.5 K/UL (ref 1–5.1)
LYMPHOCYTES RELATIVE PERCENT: 25.2 %
MCH RBC QN AUTO: 29.3 PG (ref 26–34)
MCHC RBC AUTO-ENTMCNC: 33.6 G/DL (ref 31–36)
MCV RBC AUTO: 87.2 FL (ref 80–100)
MICROSCOPIC EXAMINATION: NORMAL
MONOCYTES ABSOLUTE: 0.7 K/UL (ref 0–1.3)
MONOCYTES RELATIVE PERCENT: 12 %
NEUTROPHILS ABSOLUTE: 3.4 K/UL (ref 1.7–7.7)
NEUTROPHILS RELATIVE PERCENT: 58.6 %
NITRITE, URINE: NEGATIVE
PDW BLD-RTO: 13.2 % (ref 12.4–15.4)
PH UA: 6 (ref 5–8)
PLATELET # BLD: 238 K/UL (ref 135–450)
PMV BLD AUTO: 9.1 FL (ref 5–10.5)
POTASSIUM SERPL-SCNC: 3.9 MMOL/L (ref 3.5–5.1)
PREALBUMIN: 27.1 MG/DL (ref 20–40)
PROTEIN UA: NEGATIVE MG/DL
PROTHROMBIN TIME: 11.3 SEC (ref 10–13.2)
RBC # BLD: 5.1 M/UL (ref 4.2–5.9)
SEDIMENTATION RATE, ERYTHROCYTE: 13 MM/HR (ref 0–20)
SODIUM BLD-SCNC: 139 MMOL/L (ref 136–145)
SPECIFIC GRAVITY UA: 1.01 (ref 1–1.03)
URINE REFLEX TO CULTURE: NORMAL
URINE TYPE: NORMAL
UROBILINOGEN, URINE: 0.2 E.U./DL
VITAMIN D 25-HYDROXY: 38.6 NG/ML
WBC # BLD: 5.8 K/UL (ref 4–11)

## 2020-10-19 PROCEDURE — 83036 HEMOGLOBIN GLYCOSYLATED A1C: CPT

## 2020-10-19 PROCEDURE — 82040 ASSAY OF SERUM ALBUMIN: CPT

## 2020-10-19 PROCEDURE — 85730 THROMBOPLASTIN TIME PARTIAL: CPT

## 2020-10-19 PROCEDURE — 81003 URINALYSIS AUTO W/O SCOPE: CPT

## 2020-10-19 PROCEDURE — 86900 BLOOD TYPING SEROLOGIC ABO: CPT

## 2020-10-19 PROCEDURE — 86850 RBC ANTIBODY SCREEN: CPT

## 2020-10-19 PROCEDURE — 93005 ELECTROCARDIOGRAM TRACING: CPT | Performed by: ORTHOPAEDIC SURGERY

## 2020-10-19 PROCEDURE — 93010 ELECTROCARDIOGRAM REPORT: CPT | Performed by: INTERNAL MEDICINE

## 2020-10-19 PROCEDURE — 82306 VITAMIN D 25 HYDROXY: CPT

## 2020-10-19 PROCEDURE — 80048 BASIC METABOLIC PNL TOTAL CA: CPT

## 2020-10-19 PROCEDURE — 86901 BLOOD TYPING SEROLOGIC RH(D): CPT

## 2020-10-19 PROCEDURE — 87641 MR-STAPH DNA AMP PROBE: CPT

## 2020-10-19 PROCEDURE — 85652 RBC SED RATE AUTOMATED: CPT

## 2020-10-19 PROCEDURE — 84134 ASSAY OF PREALBUMIN: CPT

## 2020-10-19 PROCEDURE — 99396 PREV VISIT EST AGE 40-64: CPT | Performed by: FAMILY MEDICINE

## 2020-10-19 PROCEDURE — 85610 PROTHROMBIN TIME: CPT

## 2020-10-19 ASSESSMENT — PATIENT HEALTH QUESTIONNAIRE - PHQ9
SUM OF ALL RESPONSES TO PHQ9 QUESTIONS 1 & 2: 0
1. LITTLE INTEREST OR PLEASURE IN DOING THINGS: 0
SUM OF ALL RESPONSES TO PHQ QUESTIONS 1-9: 0
2. FEELING DOWN, DEPRESSED OR HOPELESS: 0

## 2020-10-19 NOTE — PROGRESS NOTES
Preop PE at request of Dr. Dawna Nash for right THR at [facility] on 04 NOV 2020. ROS : No new complaints. Patient denies chest pain, shortness of breath, or fever. PAST MEDICAL & SURGICAL HISTORY  Patient Active Problem List   Diagnosis    ADD (attention deficit disorder)    Prepatellar bursitis of right knee    Essential hypertension    Adjustment disorder with depressed mood    Arthritis of right knee    Nodule of right lung    Mediastinal adenopathy     Past Surgical History:   Procedure Laterality Date    BACK SURGERY  2010    [address] per Dr. Dr. Iggy Baltazar  02/06/2017    cysto, attempted ureteroscopy, left retrograde pyelogram    HERNIA REPAIR Right     INGUINAL HERNIA REPAIR Left 4.28.11    JOINT REPLACEMENT Right 12-    knee    KNEE ARTHROSCOPY      2004(pt unsure of which knee) 2012-both knees scoped    URETER STENT PLACEMENT Left 02/07/2017    DR. Lan Naranoj - 2U12       CURRENT MEDS  Current Outpatient Medications   Medication Sig Dispense Refill    naproxen (NAPROSYN) 500 MG tablet Take 1 tablet by mouth 2 times daily (with meals) 60 tablet 0    amLODIPine (NORVASC) 10 MG tablet TAKE ONE TABLET BY MOUTH DAILY 90 tablet 3    DUPIXENT 300 MG/2ML SOSY injection       sildenafil (REVATIO) 20 MG tablet Take 1 tablet by mouth daily as needed (sexual activity) (Patient taking differently: Take 20 mg by mouth as needed (sexual activity) ) 30 tablet 5     No current facility-administered medications for this visit. ALLERGIES  Allergies   Allergen Reactions    Seasonal Other (See Comments)     Grass/mold/pollen--pt states does not take any medication for them       Social History     Tobacco Use    Smoking status: Never Smoker    Smokeless tobacco: Never Used   Substance Use Topics    Alcohol use:  Yes     Alcohol/week: 0.0 standard drinks     Comment: socially    Drug use: No        Family History   Problem Relation Age of Onset    Crohn's Disease Mother     Diabetes Father     Arthritis Other     Cancer Other     High Blood Pressure Other       # PSA screening history: due  Lab Results   Component Value Date    PSA 0.20 03/25/2019    PSA 0.25 12/30/2014     # screen colon cancer - discussed with patient options  Done 2012 Dr. Kristy Strong advised return 5 years has not done so; again reminded and referred    # Lipids - recent screening history:  Lab Results   Component Value Date    LDLCALC 121 (H) 03/25/2019     Lab Results   Component Value Date    TRIG 106 03/25/2019     Lab Results   Component Value Date    HDL 66 (H) 03/25/2019     Lab Results   Component Value Date    CHOL 208 (H) 03/25/2019         BP (!) 150/90   Pulse 79   Resp 15   Ht 5' 8\" (1.727 m)   Wt 185 lb 12.8 oz (84.3 kg)   SpO2 98%   BMI 28.25 kg/m²   General - alert and oriented; speaking easily in complete sentences  Skin - no rash or jaundice  Neck - No lymphadenopathy. No thyromegaly. No bruit. Lungs - clear to ascultation  Heart - Regular rate and rhythm, No murmur. No gallop. No rub. Abdomen - Abdomen soft, non-tender. BS normal. No masses. Extremities - no edema. Distal perfusion palpable. Neuro - Gait normal. Non focal.  EKG: Normal sinus rhythm     Diagnosis Orders   1. Routine general medical examination at a health care facility     2. Screening for malignant neoplasm of prostate     3. Screen for colon cancer     4. Screening, lipid     5. Primary osteoarthritis of right hip      OK for surgery   6. Preop examination  CBC WITH AUTO DIFFERENTIAL    as above   7. Essential hypertension      sl above goal cont amlodipine and adkjust after surgery; in hip pain today   8.  Needs flu shot      declines ama

## 2020-10-19 NOTE — PROGRESS NOTES
Patient attended JET class on 10/19/20. Patient verified surgery for Total hip replacement and received patient information and educational JET folder including education handouts on hand hygiene and preventing constipation. Interviews completed by PT, OT, and PAT. Labs and Tests completed as ordered/necessary. Patient given handout instructions on Pre-operative Showering techniques and the use of anti-septic 3 days before surgery. Anti-septic bottle given to patient to take home. Patient states no further questions or concerns. DOS: 11/4/20  Dr Eliceo Granados: hme with wife gera and [facility] ;if applicable. Per Patient Will see/Saw PCP on 10/19/20. Patient prefers same day surgery, educated patient to discuss with surgeon preop.  I did inform denis rivera MA of patients request.  Electronically signed by Selina Cortes RN on 10/19/2020 at 3:59 PM

## 2020-10-20 LAB — MRSA SCREEN RT-PCR: NORMAL

## 2020-10-20 NOTE — CARE COORDINATION
DATE OF JET CLASS INTERVIEW:  10/20/2020    ACCOMPANIED TODAY BY: Phone Interview    FIRST JOINT REPLACEMENT? No  TYPE AND DATE OF LAST JOINT REPLACEMENT? Right knee replacement 2014    TRANSPORTATION:  Chato Burt, Wife - [phone]    STEPS INTO HOME?  3 steps into house - no handrail. There's about 10 ft. Between steps. STEPS TO BATHROOM/BEDROOM?  8 steps with handrail. Bi-level floor plan. Once up the 8 steps, everything is on one level. DME:  Needs wheeled walker. Will look for shower chair and raised toilet seat. CHOICES FOR HHC, DME VENDORS AND SKILLED/ REHAB FACILITIES PROVIDED TO PT DURING THIS INTERVIEW. DISCHARGE PLAN:/ INCLUDING WHO WILL BE STAYING WITH YOU AT HOME? WifeCindy Fix WITH THE PT, APPROPRIATE TO HIS/ HER PROCEDURE. PT HAS BEEN INFORMED THAT THEY WILL BE DISCHARGED WHEN THE PHYSICIAN DEEMS THEM MEDICALLY READY. MOST PATIENTS CAN EXPECT  TO BE IN THE HOSPITAL ONE NIGHT AS AN OBSERVATION ONLY, OR 1-2 DAYS AS AN ADMISSION FOR THOSE WITH MEDICAL HEALTH  ISSUES/COMPLICATIONS. [facility] CARE: [facility]    SNF/REHAB:  n/a        PT AGREEABLE TO MEDS TO BEDS FROM [facility]. Agreeable to meds to bed program.      Contact information for case management provided to pt. Will follow with therapies and social service.     Electronically signed by Adams Graves on 10/20/2020 at 1:10 PM

## 2020-10-21 ENCOUNTER — PREP FOR PROCEDURE (OUTPATIENT)
Dept: ORTHOPEDICS UNIT | Age: 55
End: 2020-10-21

## 2020-10-22 RX ORDER — CELECOXIB 200 MG/1
200 CAPSULE ORAL ONCE
Status: CANCELLED | OUTPATIENT
Start: 2020-10-22 | End: 2020-10-22

## 2020-10-22 RX ORDER — OXYCODONE HYDROCHLORIDE 10 MG/1
10 TABLET ORAL ONCE
Status: CANCELLED | OUTPATIENT
Start: 2020-10-22 | End: 2020-10-22

## 2020-10-26 ENCOUNTER — TELEPHONE (OUTPATIENT)
Dept: ORTHOPEDIC SURGERY | Age: 55
End: 2020-10-26

## 2020-10-26 NOTE — TELEPHONE ENCOUNTER
Called and spoke with patient. He wants to go to [facility] for his Covid testing. This was changed.

## 2020-10-29 ENCOUNTER — OFFICE VISIT (OUTPATIENT)
Dept: ORTHOPEDIC SURGERY | Age: 55
End: 2020-10-29

## 2020-10-29 ENCOUNTER — OFFICE VISIT (OUTPATIENT)
Dept: PRIMARY CARE CLINIC | Age: 55
End: 2020-10-29
Payer: COMMERCIAL

## 2020-10-29 PROCEDURE — 99024 POSTOP FOLLOW-UP VISIT: CPT | Performed by: ORTHOPAEDIC SURGERY

## 2020-10-29 PROCEDURE — 99211 OFF/OP EST MAY X REQ PHY/QHP: CPT | Performed by: NURSE PRACTITIONER

## 2020-10-29 NOTE — PATIENT INSTRUCTIONS

## 2020-10-29 NOTE — PROGRESS NOTES
Carroll Montejo is here to discuss surgical options. I had a 45 minute group discussion with greater than 50% of the time counseling the patient regarding joint arthroplasty, its preoperative evaluation, and post operative physical therapy, and pain managaement We went over the surgical procedure of hip replacement risks and complications of hip replacement including bleeding, infection, fracture, dislocation, instability, persistent pain, leg length discrepancy, neurovascular injury,  DVT, pulmonary embolism, post operative cognitive disorders, and need for revision. We also discussed the choice of implant and the hardware model was reviewed with the patient. He  understands these and we will see the patient in the operating room. There were no vitals taken for this visit. As this patient has demonstrated risk factors for osteoporosis, such as age greater than [de-identified] years and evidence of a fracture, I have referred the patient back to the primary care physician for evaluation for osteoporosis, including consideration for DEXA scanning, if this is felt to be clinically indicated. The patient is advised to contact the primary care physician to follow-up for further evaluation. Plan:  right total hip arthroplasty to be tenatively performed on 11/4/20. Please inform the patient's primary care provider.

## 2020-10-29 NOTE — PROGRESS NOTES
[facility] received a viral test for COVID-19. They were educated on isolation and quarantine as appropriate. For any symptoms, they were directed to seek care from their PCP, given contact information to establish with a doctor, directed to an urgent care or the emergency room.

## 2020-10-30 LAB — SARS-COV-2, PCR: NOT DETECTED

## 2020-10-30 NOTE — RESULT ENCOUNTER NOTE

## 2020-10-30 NOTE — PROGRESS NOTES
Preoperative Screening for Elective Surgery/Invasive Procedures While COVID-19 present in the community     Have you tested positive or have been told to self-isolate for COVID-19 like symptoms within the past 28 days? no   Do you currently have any of the following symptoms?no  o Fever >100.0 F or 99.9 F in immunocompromised patients?no  o New onset cough, shortness of breath or difficulty breathing?no  o New onset sore throat, myalgia (muscle aches and pains), headache, loss of taste/smell or diarrhea? no   Have you had a potential exposure to COVID-19 within the past 14 days by:  o Close contact with a confirmed case?no  o Close contact with a healthcare worker,  or essential infrastructure worker (grocery store, [employer], gas station, public utilities or transportation)?no  o Do you reside in a congregate setting such as; skilled nursing facility, adult home, correctional facility, homeless shelter or other institutional setting?no  o Have you had recent travel to a known COVID-19 hotspot? no    Indicate if the patient has a positive screen by answering yes to one or more of the above questions. Patients who test positive or screen positive prior to surgery or on the day of surgery should be evaluated in conjunction with the surgeon/proceduralist/anesthesiologist to determine the urgency of the procedure. C-Difficile admission screening and protocol:  * Admitted with diarrhea? no  *Prior history of C-Diff. In last 3 months? no  *Antibiotic use in the past 6-8 weeks? .no  *Prior hospitalization or nursing home in the last month?no    PRE-OP INSTRUCTIONS     · Do not eat or drink anything after 12:00 midnight prior to surgery. This includes water, chewing gum, mints and ice chips. You may brush your teeth and gargle the morning of surgery but DO  NOT SWALLOW THE WATER. Take the following medications with a small sip of water on the morning of surgery: Follow your Doctor's pre procedure instructions regarding medications    · If you use an inhaler, please use it the morning of surgery and bring with you to hospital.    · You may be asked to stop blood thinners such as:  Coumadin, Plavix, Fragmin and lovenox. Please check with your doctor before stopping these or any other medications. · Aspirin, ibuprofen, advil and naproxen, any anti-inflammatory products should be stopped for a week prior to your surgery. · Do not smoke and do not drink any alcoholic beverages 24 hours prior to your surgery. · Please do not wear any jewelry or body piercings on the day of surgery. · Please wear something simple, loose fitting clothing to the hospital.  Do not wear any make-up(including eye make-up) or nail polish on your fingers and toes. · As part of our patient safety program to minimize surgical infections, we ask you to do the followin. Please notify your surgeon if you develop any illness between now                          and the day of your surgery. This includes a cough, cold, fever, sore                       throat, nausea, vomiting, diarrhea, etc.  Also please notify your                                  surgeon if you experience dizziness, shortness of breath or                       Blurred vision between now and the time of your surgery. 2.  Please notify your surgeon of any open or redden areas that may                        look infected                     3.  DO NOT shave your operative site 96 hours(four days) prior to                                  surgery. 4.  Shower the week before surgery with an antibacterial soap, such as                       dial, safeguard, etc.                         5.  Three(3) days prior to your surgery, cleanse the operative site with                          Hibiclens(anti-microbial soap).   This soap may dry your skin, please                          do not apply any oils or lotions     · Please bring your insurance card and picture ID day of surgery    · If you have a living will or durable power of attorny. Please bring in a copy of your advanced directives. · If you have dentures, they will be removed before going to the OR, we will provide you with a container. If you wear contact lenses or glasses, they will be removes, please bring a case for them. · Have you seen your family doctor for a pre-op history and physical.      · Surgery scheduler will call you 48 hours prior to your surgery to notify you of the time of your surgery and the time you will need to be at hospital...patients are asked to arrive 21/2 hours prior to surgery. ·  Please call Pre-Admission testing if you have any further questions.                   [id number] [facility] testing phone number:  [phone]      Thank You for choosing [facility]!!

## 2020-11-02 NOTE — DISCHARGE INSTR - COC
[facility]) Continuity of Care Form    Patient Name:  Mark Padilla  : 1965    MRN:  [id number]    Admit date:  (Not on file)  Discharge date:  2020    Code Status Order: Prior  Advance Directives: Yes    Admitting Physician: Kenna Ricardo MD  PCP: Daniel Cerda MD    Discharging Nurse: [facility] Unit/Room#: No information available for this encounter. Discharging Unit Phone Number: [phone]    Emergency Contact:        Past Surgical History:  Past Surgical History:   Procedure Laterality Date    BACK SURGERY      CERVICAL [address] per Dr. Dr. Jairo Wyatt  2017    cysto, attempted ureteroscopy, left retrograde pyelogram    HERNIA REPAIR Right     INGUINAL HERNIA REPAIR Left 11    JOINT REPLACEMENT Right 2015    knee    KNEE ARTHROSCOPY      (pt unsure of which knee) -both knees scoped    URETER STENT PLACEMENT Left 2017     [facility] - [id number]       Immunization History:   Immunization History   Administered Date(s) Administered    Influenza Vaccine, unspecified formulation 2009, 2017    Pneumococcal Conjugate Vaccine 2017       Active Problems:  Active Problems:    * No active hospital problems. *  Resolved Problems:    * No resolved hospital problems. *      Isolation/Infection:       Nurse Assessment:  Last Vital Signs: There were no vitals taken for this visit. Last documented pain score (0-10 scale):    Last Weight:   Wt Readings from Last 1 Encounters:   10/19/20 185 lb 12.8 oz (84.3 kg)     Mental Status:  oriented and alert     IV Access:  - None    Nursing Mobility/ADLs:  Walking   Assisted  Transfer  Assisted  Bathing  Assisted  Dressing  Assisted  Toileting  Assisted  Feeding  Independent  Med Admin  Independent  Med Delivery   whole    Wound Care Documentation and Therapy:  Keep glued Prineo dressing intact. DO NOT remove. This is waterproof for showering.  Doctor will evaluate wound at office visit 2 weeks after surgery to discuss removal.           Elimination:  Urinary Catheter: None   Colostomy/Ileostomy: No  Continence:   · Bowel: Yes  · Bladder: Yes  Date of Last BM: 11/4/20    Intake/Output Summary (Last 24 hours)   No intake or output data in the 24 hours ending 11/02/20 1034  Safety Concerns: At Risk for Falls    Impairments/Disabilities:      None    Nutrition Therapy:  Current Nutrition Therapy: general  Routes of Feeding: Oral  Liquids: No Restrictions  Daily Fluid Restriction: no  Last Modified Barium Swallow with Video (Video Swallowing Test): not done    Treatments at the Time of Hospital Discharge:   Respiratory Treatments:   Oxygen Therapy:  is not on home oxygen therapy. Ventilator:    - No ventilator support    Lab orders for discharge:        Rehab Therapies: Physical Therapy, Occupational Therapy   Weight Bearing Status/Restrictions: No weight bearing restrictions, anterior hip precautions, NO straight leg raises  Other Medical Equipment (for information only, NOT a DME order):  Rolling walker  Other Treatments: ASA 81mg twice at day for 30 days for DVT prophylaxis , bilateral knee high MARSHAL hose for 2 weeks after surgery    Patient's personal belongings (please select all that are sent with patient):  clothing    RN SIGNATURE:  Electronically signed by Mary Solares RN on 11/4/20 at 1:11 PM EST    PHYSICIAN SECTION    Prognosis: Good    Condition at Discharge: Stable    Rehab Potential (if transferring to Rehab): Good    Physician Certification: I certify the above orders, information, and transfer of Renee Tillman is necessary for the continuing treatment of the diagnosis listed and that he requires [address] for less 30 days.      Update Admission H&P: No change in H&P    PHYSICIAN SIGNATURE:  Electronically signed by TIFFANIE Hutchinson CNP on 11/4/20 at 10:35 AM EST/ Dr Jed Wild in office 2 weeks after surgery   [facility] and Sports Medicine, [address] [facility], [address] [address],   [phone]    CASE MANAGEMENT/SOCIAL WORK SECTION    Inpatient Status Date: ***    isinger Readmission Risk Assessment Score:    Discharging to Facility/ Agency   Name:  [facility] care    Address: [facility]., [address] [place] [address]., [facility]  Phone: [phone]  Fax: [phone]    / signature: {Esignature:[id number]:::0}  Activity:   Elevate your leg if swelling occurs in your ankle. Use elastic wraps/hose until swelling decreases.  Continue the exercise program as prescribed by physical therapists.  Take frequent walks.  Use walker, crutches, or cane with weight bearing instructions as indicated by the physical therapists.  Take rest periods often. Elevate leg during rest period.  Avoid sitting in low chairs or toilets without raised seats.  Keep knees apart. Sleep with a pillow between your legs.  Do not cross your legs, especially when putting on shoes and socks. Wound Care:   Cover the wound with a sterile gauze dressing and change daily as long as there is drainage.  Do not scrub wound. Pat it dry with a soft towel.  Dont apply any lotions or creams to your wound.  Check the incision every day for redness, swelling, or increase in drainage. Diet:   You can resume your normal diet. There are no limits on your diet due to your surgery.  Pain pills and activity changes may lead to constipation. To prevent this, use prune juice or bran cereals liberally. You may need to use a laxative such as Dulcolax, Senokot, or Milk of Magnesia.  Drink plenty of fluids. Medications:   Take pain pills if needed to maintain comfort.  Never drive while taking pain medicine.  Avoid over the counter medications until checking with your doctor.  Resume previous medications as instructed by your doctor. You will be on Aspirin or Eliquis  for 30 days only.   Stay off other anti-inflammatory medications (except Celebrex) while on blood thinners  Call Your Doctor If:  [place] Self You have increased pain not controlled by medications.  Excessive swelling in your ankle.  You develop numbness, tingling, or decreased movement.  You have a fever greater than 100 degrees for a day or over 101 degrees at any one time.  Your wound becomes more reddened, starts draining, or opens.  If you fall. You have any questions about your recovery. ? Inform your family doctor/dentist or any other doctor who cares for you in the future that you have a joint replacement. You may need antibiotics for dental or surgical procedures if there is any evidence of infection present. ?  If you have required the use of insulin to control your blood sugar after surgery, follow up with your family doctor. ? Call your surgeons office to schedule your appointment to be seen after surgery. ? Make your appointment to continue physical therapy per doctors orders. ? Smoking cessation assistance can be obtained from your family doctor or by [address] @ [phone]    _______________________________   _____   _______________________  ____                Patient Signature              Date      Witness                               Date          Anterior Approach  The main positions and movements to avoid after an anterior approach include bending the hip back, turning your hip and leg out, or spreading your leg outward.  Don't stretch your hip back. Walk with short steps. Taking a longer step when leading with your nonoperated hip stretches the surgical hip back.  Don't kneel only on one knee. Kneeling only on the surgical hip stretches the hip back. Use both knees when you must kneel down.  Don't turn your foot out. Place a pillow next to your hip and leg to keep your leg from turning or rolling out while lying on your back in bed.  Don't twist your body away from your operated hip.  This means don't stand with your toes pointed out. Keep the toes of your affected leg pointed forward when you stand, sit, or walk. If you turn your body away from your surgical hip without pivoting your foot, your hip will be placed in an unsafe position. Remember to lift and turn your foot as you turn.  Don't swing your leg outward away from your body. This means scooting to the side in bed by supporting your surgical leg.  Don't put your leg in a straddling position, as though you are mounting a horse. This means preventing your leg from bending up and out when getting in or out of the bathtub.  Instead, hold your leg, and lift it straight up and over the edge of the tub

## 2020-11-03 ENCOUNTER — ANESTHESIA EVENT (OUTPATIENT)
Dept: OPERATING ROOM | Age: 55
End: 2020-11-03
Payer: COMMERCIAL

## 2020-11-04 ENCOUNTER — ANESTHESIA (OUTPATIENT)
Dept: OPERATING ROOM | Age: 55
End: 2020-11-04
Payer: COMMERCIAL

## 2020-11-04 ENCOUNTER — HOSPITAL ENCOUNTER (OUTPATIENT)
Age: 55
Setting detail: OUTPATIENT SURGERY
Discharge: HOME | End: 2020-11-04
Attending: ORTHOPAEDIC SURGERY | Admitting: ORTHOPAEDIC SURGERY
Payer: COMMERCIAL

## 2020-11-04 ENCOUNTER — APPOINTMENT (OUTPATIENT)
Dept: GENERAL RADIOLOGY | Age: 55
End: 2020-11-04
Attending: ORTHOPAEDIC SURGERY
Payer: COMMERCIAL

## 2020-11-04 VITALS
SYSTOLIC BLOOD PRESSURE: 117 MMHG | WEIGHT: 177.14 LBS | HEART RATE: 77 BPM | BODY MASS INDEX: 26.85 KG/M2 | OXYGEN SATURATION: 92 % | HEIGHT: 68 IN | RESPIRATION RATE: 16 BRPM | DIASTOLIC BLOOD PRESSURE: 73 MMHG | TEMPERATURE: 97.3 F

## 2020-11-04 VITALS
OXYGEN SATURATION: 97 % | SYSTOLIC BLOOD PRESSURE: 122 MMHG | RESPIRATION RATE: 1 BRPM | DIASTOLIC BLOOD PRESSURE: 60 MMHG | TEMPERATURE: 96.8 F

## 2020-11-04 LAB
ABO/RH: NORMAL
ANTIBODY SCREEN: NORMAL
GLUCOSE BLD-MCNC: 120 MG/DL (ref 70–99)
PERFORMED ON: ABNORMAL

## 2020-11-04 PROCEDURE — 6360000002 HC RX W HCPCS: Performed by: ORTHOPAEDIC SURGERY

## 2020-11-04 PROCEDURE — 97165 OT EVAL LOW COMPLEX 30 MIN: CPT

## 2020-11-04 PROCEDURE — 2500000003 HC RX 250 WO HCPCS: Performed by: NURSE ANESTHETIST, CERTIFIED REGISTERED

## 2020-11-04 PROCEDURE — 6360000002 HC RX W HCPCS: Performed by: NURSE PRACTITIONER

## 2020-11-04 PROCEDURE — 6370000000 HC RX 637 (ALT 250 FOR IP): Performed by: ORTHOPAEDIC SURGERY

## 2020-11-04 PROCEDURE — 73502 X-RAY EXAM HIP UNI 2-3 VIEWS: CPT

## 2020-11-04 PROCEDURE — 6360000002 HC RX W HCPCS: Performed by: NURSE ANESTHETIST, CERTIFIED REGISTERED

## 2020-11-04 PROCEDURE — 7100000001 HC PACU RECOVERY - ADDTL 15 MIN: Performed by: ORTHOPAEDIC SURGERY

## 2020-11-04 PROCEDURE — C9290 INJ, BUPIVACAINE LIPOSOME: HCPCS | Performed by: ORTHOPAEDIC SURGERY

## 2020-11-04 PROCEDURE — 97110 THERAPEUTIC EXERCISES: CPT

## 2020-11-04 PROCEDURE — 2580000003 HC RX 258: Performed by: ANESTHESIOLOGY

## 2020-11-04 PROCEDURE — 73501 X-RAY EXAM HIP UNI 1 VIEW: CPT

## 2020-11-04 PROCEDURE — 6370000000 HC RX 637 (ALT 250 FOR IP): Performed by: NURSE PRACTITIONER

## 2020-11-04 PROCEDURE — 2709999900 HC NON-CHARGEABLE SUPPLY: Performed by: ORTHOPAEDIC SURGERY

## 2020-11-04 PROCEDURE — 97116 GAIT TRAINING THERAPY: CPT

## 2020-11-04 PROCEDURE — 3700000000 HC ANESTHESIA ATTENDED CARE: Performed by: ORTHOPAEDIC SURGERY

## 2020-11-04 PROCEDURE — 88311 DECALCIFY TISSUE: CPT

## 2020-11-04 PROCEDURE — 3700000001 HC ADD 15 MINUTES (ANESTHESIA): Performed by: ORTHOPAEDIC SURGERY

## 2020-11-04 PROCEDURE — 3600000015 HC SURGERY LEVEL 5 ADDTL 15MIN: Performed by: ORTHOPAEDIC SURGERY

## 2020-11-04 PROCEDURE — 86901 BLOOD TYPING SEROLOGIC RH(D): CPT

## 2020-11-04 PROCEDURE — 88304 TISSUE EXAM BY PATHOLOGIST: CPT

## 2020-11-04 PROCEDURE — 97535 SELF CARE MNGMENT TRAINING: CPT

## 2020-11-04 PROCEDURE — 97530 THERAPEUTIC ACTIVITIES: CPT

## 2020-11-04 PROCEDURE — 7100000011 HC PHASE II RECOVERY - ADDTL 15 MIN: Performed by: ORTHOPAEDIC SURGERY

## 2020-11-04 PROCEDURE — 97161 PT EVAL LOW COMPLEX 20 MIN: CPT

## 2020-11-04 PROCEDURE — 3209999900 FLUORO FOR SURGICAL PROCEDURES

## 2020-11-04 PROCEDURE — 86850 RBC ANTIBODY SCREEN: CPT

## 2020-11-04 PROCEDURE — 6360000002 HC RX W HCPCS: Performed by: ANESTHESIOLOGY

## 2020-11-04 PROCEDURE — 7100000010 HC PHASE II RECOVERY - FIRST 15 MIN: Performed by: ORTHOPAEDIC SURGERY

## 2020-11-04 PROCEDURE — 86900 BLOOD TYPING SEROLOGIC ABO: CPT

## 2020-11-04 PROCEDURE — 3600000005 HC SURGERY LEVEL 5 BASE: Performed by: ORTHOPAEDIC SURGERY

## 2020-11-04 PROCEDURE — 2580000003 HC RX 258: Performed by: ORTHOPAEDIC SURGERY

## 2020-11-04 PROCEDURE — 2580000003 HC RX 258: Performed by: NURSE PRACTITIONER

## 2020-11-04 PROCEDURE — 2500000003 HC RX 250 WO HCPCS: Performed by: NURSE PRACTITIONER

## 2020-11-04 PROCEDURE — 2720000010 HC SURG SUPPLY STERILE: Performed by: ORTHOPAEDIC SURGERY

## 2020-11-04 PROCEDURE — 7100000000 HC PACU RECOVERY - FIRST 15 MIN: Performed by: ORTHOPAEDIC SURGERY

## 2020-11-04 PROCEDURE — 2500000003 HC RX 250 WO HCPCS: Performed by: ORTHOPAEDIC SURGERY

## 2020-11-04 PROCEDURE — C1776 JOINT DEVICE (IMPLANTABLE): HCPCS | Performed by: ORTHOPAEDIC SURGERY

## 2020-11-04 DEVICE — CUP ACET DIA54MM HIP GRIPTION PRI CEMENTLESS FIX SECT SER: Type: IMPLANTABLE DEVICE | Site: HIP | Status: FUNCTIONAL

## 2020-11-04 DEVICE — HEAD FEM DIA36MM +1.5MM OFFSET 12/14 TAPR HIP CERAMIC: Type: IMPLANTABLE DEVICE | Site: HIP | Status: FUNCTIONAL

## 2020-11-04 DEVICE — LINER ACET OD54MM ID36MM HIP ALTRX PINN: Type: IMPLANTABLE DEVICE | Site: HIP | Status: FUNCTIONAL

## 2020-11-04 DEVICE — STEM FEM SZ 6 L107MM 12/14 TAPR HI OFFSET HIP DUOFIX CLLRD: Type: IMPLANTABLE DEVICE | Site: HIP | Status: FUNCTIONAL

## 2020-11-04 RX ORDER — ACETAMINOPHEN 500 MG
1000 TABLET ORAL ONCE
Status: COMPLETED | OUTPATIENT
Start: 2020-11-04 | End: 2020-11-04

## 2020-11-04 RX ORDER — CEPHALEXIN 500 MG/1
500 CAPSULE ORAL SEE ADMIN INSTRUCTIONS
Qty: 2 CAPSULE | Refills: 0 | Status: SHIPPED | OUTPATIENT
Start: 2020-11-04 | End: 2021-05-27 | Stop reason: ALTCHOICE

## 2020-11-04 RX ORDER — TRANEXAMIC ACID 100 MG/ML
INJECTION, SOLUTION INTRAVENOUS
Status: COMPLETED | OUTPATIENT
Start: 2020-11-04 | End: 2020-11-04

## 2020-11-04 RX ORDER — SODIUM CHLORIDE 0.9 % (FLUSH) 0.9 %
10 SYRINGE (ML) INJECTION PRN
Status: DISCONTINUED | OUTPATIENT
Start: 2020-11-04 | End: 2020-11-04 | Stop reason: HOSPADM

## 2020-11-04 RX ORDER — MIDAZOLAM HYDROCHLORIDE 1 MG/ML
INJECTION INTRAMUSCULAR; INTRAVENOUS PRN
Status: DISCONTINUED | OUTPATIENT
Start: 2020-11-04 | End: 2020-11-04 | Stop reason: SDUPTHER

## 2020-11-04 RX ORDER — GLYCOPYRROLATE 0.2 MG/ML
INJECTION INTRAMUSCULAR; INTRAVENOUS PRN
Status: DISCONTINUED | OUTPATIENT
Start: 2020-11-04 | End: 2020-11-04 | Stop reason: SDUPTHER

## 2020-11-04 RX ORDER — DEXAMETHASONE SODIUM PHOSPHATE 4 MG/ML
INJECTION, SOLUTION INTRA-ARTICULAR; INTRALESIONAL; INTRAMUSCULAR; INTRAVENOUS; SOFT TISSUE PRN
Status: DISCONTINUED | OUTPATIENT
Start: 2020-11-04 | End: 2020-11-04 | Stop reason: SDUPTHER

## 2020-11-04 RX ORDER — FENTANYL CITRATE 50 UG/ML
INJECTION, SOLUTION INTRAMUSCULAR; INTRAVENOUS PRN
Status: DISCONTINUED | OUTPATIENT
Start: 2020-11-04 | End: 2020-11-04 | Stop reason: SDUPTHER

## 2020-11-04 RX ORDER — VANCOMYCIN HYDROCHLORIDE 1 G/20ML
INJECTION, POWDER, LYOPHILIZED, FOR SOLUTION INTRAVENOUS
Status: COMPLETED | OUTPATIENT
Start: 2020-11-04 | End: 2020-11-04

## 2020-11-04 RX ORDER — SODIUM CHLORIDE 9 MG/ML
INJECTION, SOLUTION INTRAVENOUS CONTINUOUS
Status: DISCONTINUED | OUTPATIENT
Start: 2020-11-04 | End: 2020-11-04 | Stop reason: HOSPADM

## 2020-11-04 RX ORDER — ROCURONIUM BROMIDE 10 MG/ML
INJECTION, SOLUTION INTRAVENOUS PRN
Status: DISCONTINUED | OUTPATIENT
Start: 2020-11-04 | End: 2020-11-04 | Stop reason: SDUPTHER

## 2020-11-04 RX ORDER — SODIUM CHLORIDE 0.9 % (FLUSH) 0.9 %
10 SYRINGE (ML) INJECTION EVERY 12 HOURS SCHEDULED
Status: DISCONTINUED | OUTPATIENT
Start: 2020-11-04 | End: 2020-11-04 | Stop reason: HOSPADM

## 2020-11-04 RX ORDER — KETOROLAC TROMETHAMINE 30 MG/ML
INJECTION, SOLUTION INTRAMUSCULAR; INTRAVENOUS PRN
Status: DISCONTINUED | OUTPATIENT
Start: 2020-11-04 | End: 2020-11-04 | Stop reason: SDUPTHER

## 2020-11-04 RX ORDER — OXYCODONE HYDROCHLORIDE AND ACETAMINOPHEN 5; 325 MG/1; MG/1
2 TABLET ORAL PRN
Status: DISCONTINUED | OUTPATIENT
Start: 2020-11-04 | End: 2020-11-04

## 2020-11-04 RX ORDER — MORPHINE SULFATE 2 MG/ML
2 INJECTION, SOLUTION INTRAMUSCULAR; INTRAVENOUS EVERY 5 MIN PRN
Status: DISCONTINUED | OUTPATIENT
Start: 2020-11-04 | End: 2020-11-04

## 2020-11-04 RX ORDER — MORPHINE SULFATE 2 MG/ML
1 INJECTION, SOLUTION INTRAMUSCULAR; INTRAVENOUS EVERY 5 MIN PRN
Status: DISCONTINUED | OUTPATIENT
Start: 2020-11-04 | End: 2020-11-04

## 2020-11-04 RX ORDER — LIDOCAINE HYDROCHLORIDE 20 MG/ML
INJECTION, SOLUTION EPIDURAL; INFILTRATION; INTRACAUDAL; PERINEURAL PRN
Status: DISCONTINUED | OUTPATIENT
Start: 2020-11-04 | End: 2020-11-04 | Stop reason: SDUPTHER

## 2020-11-04 RX ORDER — CELECOXIB 200 MG/1
200 CAPSULE ORAL ONCE
Status: COMPLETED | OUTPATIENT
Start: 2020-11-04 | End: 2020-11-04

## 2020-11-04 RX ORDER — ASPIRIN 81 MG/1
81 TABLET ORAL 2 TIMES DAILY
Qty: 60 TABLET | Refills: 0 | Status: SHIPPED | OUTPATIENT
Start: 2020-11-04 | End: 2020-12-07

## 2020-11-04 RX ORDER — ONDANSETRON 2 MG/ML
4 INJECTION INTRAMUSCULAR; INTRAVENOUS
Status: DISCONTINUED | OUTPATIENT
Start: 2020-11-04 | End: 2020-11-04 | Stop reason: HOSPADM

## 2020-11-04 RX ORDER — PROPOFOL 10 MG/ML
INJECTION, EMULSION INTRAVENOUS PRN
Status: DISCONTINUED | OUTPATIENT
Start: 2020-11-04 | End: 2020-11-04 | Stop reason: SDUPTHER

## 2020-11-04 RX ORDER — NAPROXEN 500 MG/1
500 TABLET ORAL 2 TIMES DAILY WITH MEALS
Qty: 60 TABLET | Refills: 0
Start: 2020-11-04 | End: 2020-12-07

## 2020-11-04 RX ORDER — OXYCODONE HYDROCHLORIDE 5 MG/1
5-10 TABLET ORAL EVERY 6 HOURS PRN
Qty: 40 TABLET | Refills: 0 | Status: SHIPPED | OUTPATIENT
Start: 2020-11-04 | End: 2020-11-09

## 2020-11-04 RX ORDER — ONDANSETRON 2 MG/ML
INJECTION INTRAMUSCULAR; INTRAVENOUS PRN
Status: DISCONTINUED | OUTPATIENT
Start: 2020-11-04 | End: 2020-11-04 | Stop reason: SDUPTHER

## 2020-11-04 RX ORDER — MEPERIDINE HYDROCHLORIDE 25 MG/ML
12.5 INJECTION INTRAMUSCULAR; INTRAVENOUS; SUBCUTANEOUS EVERY 5 MIN PRN
Status: DISCONTINUED | OUTPATIENT
Start: 2020-11-04 | End: 2020-11-04 | Stop reason: HOSPADM

## 2020-11-04 RX ORDER — OXYCODONE HYDROCHLORIDE 10 MG/1
10 TABLET ORAL EVERY 6 HOURS PRN
Status: DISCONTINUED | OUTPATIENT
Start: 2020-11-04 | End: 2020-11-04 | Stop reason: HOSPADM

## 2020-11-04 RX ORDER — OXYCODONE HYDROCHLORIDE 10 MG/1
10 TABLET ORAL ONCE
Status: COMPLETED | OUTPATIENT
Start: 2020-11-04 | End: 2020-11-04

## 2020-11-04 RX ORDER — OXYCODONE HYDROCHLORIDE AND ACETAMINOPHEN 5; 325 MG/1; MG/1
1 TABLET ORAL PRN
Status: DISCONTINUED | OUTPATIENT
Start: 2020-11-04 | End: 2020-11-04

## 2020-11-04 RX ORDER — HYDRALAZINE HYDROCHLORIDE 20 MG/ML
5 INJECTION INTRAMUSCULAR; INTRAVENOUS
Status: DISCONTINUED | OUTPATIENT
Start: 2020-11-04 | End: 2020-11-04 | Stop reason: HOSPADM

## 2020-11-04 RX ORDER — LABETALOL HYDROCHLORIDE 5 MG/ML
5 INJECTION, SOLUTION INTRAVENOUS EVERY 10 MIN PRN
Status: DISCONTINUED | OUTPATIENT
Start: 2020-11-04 | End: 2020-11-04 | Stop reason: HOSPADM

## 2020-11-04 RX ORDER — OXYCODONE HYDROCHLORIDE 5 MG/1
5 TABLET ORAL EVERY 6 HOURS PRN
Status: DISCONTINUED | OUTPATIENT
Start: 2020-11-04 | End: 2020-11-04 | Stop reason: HOSPADM

## 2020-11-04 RX ADMIN — ACETAMINOPHEN 1000 MG: 500 TABLET ORAL at 12:06

## 2020-11-04 RX ADMIN — PROPOFOL 150 MG: 10 INJECTION, EMULSION INTRAVENOUS at 07:43

## 2020-11-04 RX ADMIN — OXYCODONE HYDROCHLORIDE 10 MG: 10 TABLET ORAL at 06:44

## 2020-11-04 RX ADMIN — SODIUM CHLORIDE: 9 INJECTION, SOLUTION INTRAVENOUS at 07:34

## 2020-11-04 RX ADMIN — FENTANYL CITRATE 50 MCG: 50 INJECTION INTRAMUSCULAR; INTRAVENOUS at 07:50

## 2020-11-04 RX ADMIN — HYDROMORPHONE HYDROCHLORIDE 0.5 MG: 1 INJECTION, SOLUTION INTRAMUSCULAR; INTRAVENOUS; SUBCUTANEOUS at 09:20

## 2020-11-04 RX ADMIN — HYDROMORPHONE HYDROCHLORIDE 0.25 MG: 1 INJECTION, SOLUTION INTRAMUSCULAR; INTRAVENOUS; SUBCUTANEOUS at 08:42

## 2020-11-04 RX ADMIN — HYDROMORPHONE HYDROCHLORIDE 0.25 MG: 1 INJECTION, SOLUTION INTRAMUSCULAR; INTRAVENOUS; SUBCUTANEOUS at 08:29

## 2020-11-04 RX ADMIN — CEFAZOLIN SODIUM 2 G: 10 INJECTION, POWDER, FOR SOLUTION INTRAVENOUS at 12:05

## 2020-11-04 RX ADMIN — CEFAZOLIN SODIUM 2 G: 10 INJECTION, POWDER, FOR SOLUTION INTRAVENOUS at 07:39

## 2020-11-04 RX ADMIN — LIDOCAINE HYDROCHLORIDE 100 MG: 20 INJECTION, SOLUTION EPIDURAL; INFILTRATION; INTRACAUDAL; PERINEURAL at 07:43

## 2020-11-04 RX ADMIN — OXYCODONE 5 MG: 5 TABLET ORAL at 13:16

## 2020-11-04 RX ADMIN — GLYCOPYRROLATE 0.2 MG: 0.2 INJECTION, SOLUTION INTRAMUSCULAR; INTRAVENOUS at 07:32

## 2020-11-04 RX ADMIN — SUGAMMADEX 200 MG: 100 INJECTION, SOLUTION INTRAVENOUS at 08:55

## 2020-11-04 RX ADMIN — ONDANSETRON 4 MG: 2 INJECTION INTRAMUSCULAR; INTRAVENOUS at 08:44

## 2020-11-04 RX ADMIN — KETOROLAC TROMETHAMINE 30 MG: 30 INJECTION, SOLUTION INTRAMUSCULAR at 08:55

## 2020-11-04 RX ADMIN — DEXAMETHASONE SODIUM PHOSPHATE 8 MG: 4 INJECTION, SOLUTION INTRAMUSCULAR; INTRAVENOUS at 07:52

## 2020-11-04 RX ADMIN — HYDROMORPHONE HYDROCHLORIDE 0.5 MG: 1 INJECTION, SOLUTION INTRAMUSCULAR; INTRAVENOUS; SUBCUTANEOUS at 09:31

## 2020-11-04 RX ADMIN — SODIUM CHLORIDE: 9 INJECTION, SOLUTION INTRAVENOUS at 06:44

## 2020-11-04 RX ADMIN — MIDAZOLAM 2 MG: 1 INJECTION INTRAMUSCULAR; INTRAVENOUS at 07:32

## 2020-11-04 RX ADMIN — ROCURONIUM BROMIDE 50 MG: 10 INJECTION INTRAVENOUS at 07:43

## 2020-11-04 RX ADMIN — CELECOXIB 200 MG: 200 CAPSULE ORAL at 06:44

## 2020-11-04 RX ADMIN — HYDROMORPHONE HYDROCHLORIDE 0.5 MG: 1 INJECTION, SOLUTION INTRAMUSCULAR; INTRAVENOUS; SUBCUTANEOUS at 08:54

## 2020-11-04 RX ADMIN — SODIUM CHLORIDE 1000 MG: 900 INJECTION INTRAVENOUS at 14:14

## 2020-11-04 ASSESSMENT — PULMONARY FUNCTION TESTS
PIF_VALUE: 16
PIF_VALUE: 14
PIF_VALUE: 5
PIF_VALUE: 1
PIF_VALUE: 14
PIF_VALUE: 17
PIF_VALUE: 14
PIF_VALUE: 15
PIF_VALUE: 16
PIF_VALUE: 16
PIF_VALUE: 14
PIF_VALUE: 14
PIF_VALUE: 3
PIF_VALUE: 17
PIF_VALUE: 16
PIF_VALUE: 16
PIF_VALUE: 0
PIF_VALUE: 14
PIF_VALUE: 14
PIF_VALUE: 1
PIF_VALUE: 26
PIF_VALUE: 14
PIF_VALUE: 14
PIF_VALUE: 0
PIF_VALUE: 17
PIF_VALUE: 2
PIF_VALUE: 2
PIF_VALUE: 15
PIF_VALUE: 14
PIF_VALUE: 10
PIF_VALUE: 14
PIF_VALUE: 1
PIF_VALUE: 17
PIF_VALUE: 14
PIF_VALUE: 14
PIF_VALUE: 16
PIF_VALUE: 14
PIF_VALUE: 16
PIF_VALUE: 17
PIF_VALUE: 16
PIF_VALUE: 14
PIF_VALUE: 15
PIF_VALUE: 14
PIF_VALUE: 0
PIF_VALUE: 16
PIF_VALUE: 14
PIF_VALUE: 8
PIF_VALUE: 14
PIF_VALUE: 4
PIF_VALUE: 14
PIF_VALUE: 15
PIF_VALUE: 16
PIF_VALUE: 3
PIF_VALUE: 16
PIF_VALUE: 5
PIF_VALUE: 15
PIF_VALUE: 15
PIF_VALUE: 8
PIF_VALUE: 16
PIF_VALUE: 14
PIF_VALUE: 16
PIF_VALUE: 8
PIF_VALUE: 14
PIF_VALUE: 16
PIF_VALUE: 17
PIF_VALUE: 6
PIF_VALUE: 15
PIF_VALUE: 17
PIF_VALUE: 14
PIF_VALUE: 17
PIF_VALUE: 17
PIF_VALUE: 14
PIF_VALUE: 5
PIF_VALUE: 14
PIF_VALUE: 1
PIF_VALUE: 5
PIF_VALUE: 16
PIF_VALUE: 14
PIF_VALUE: 17
PIF_VALUE: 15
PIF_VALUE: 14
PIF_VALUE: 3
PIF_VALUE: 14

## 2020-11-04 ASSESSMENT — PAIN DESCRIPTION - DESCRIPTORS
DESCRIPTORS: ACHING;DULL
DESCRIPTORS: SORE
DESCRIPTORS: ACHING;SORE
DESCRIPTORS: SORE
DESCRIPTORS: ACHING;SORE
DESCRIPTORS: ACHING;DULL
DESCRIPTORS: ACHING;SHARP
DESCRIPTORS: SHARP

## 2020-11-04 ASSESSMENT — PAIN - FUNCTIONAL ASSESSMENT
PAIN_FUNCTIONAL_ASSESSMENT: PREVENTS OR INTERFERES SOME ACTIVE ACTIVITIES AND ADLS
PAIN_FUNCTIONAL_ASSESSMENT: 0-10
PAIN_FUNCTIONAL_ASSESSMENT: PREVENTS OR INTERFERES SOME ACTIVE ACTIVITIES AND ADLS
PAIN_FUNCTIONAL_ASSESSMENT: PREVENTS OR INTERFERES SOME ACTIVE ACTIVITIES AND ADLS

## 2020-11-04 ASSESSMENT — PAIN SCALES - GENERAL
PAINLEVEL_OUTOF10: 2
PAINLEVEL_OUTOF10: 8
PAINLEVEL_OUTOF10: 2
PAINLEVEL_OUTOF10: 3
PAINLEVEL_OUTOF10: 7
PAINLEVEL_OUTOF10: 5
PAINLEVEL_OUTOF10: 4
PAINLEVEL_OUTOF10: 8

## 2020-11-04 ASSESSMENT — PAIN DESCRIPTION - ORIENTATION
ORIENTATION: RIGHT

## 2020-11-04 ASSESSMENT — PAIN DESCRIPTION - ONSET
ONSET: ON-GOING
ONSET: AWAKENED FROM SLEEP
ONSET: ON-GOING
ONSET: AWAKENED FROM SLEEP

## 2020-11-04 ASSESSMENT — PAIN DESCRIPTION - FREQUENCY
FREQUENCY: CONTINUOUS

## 2020-11-04 ASSESSMENT — PAIN DESCRIPTION - PROGRESSION
CLINICAL_PROGRESSION: GRADUALLY IMPROVING
CLINICAL_PROGRESSION: NOT CHANGED
CLINICAL_PROGRESSION: GRADUALLY IMPROVING
CLINICAL_PROGRESSION: NOT CHANGED
CLINICAL_PROGRESSION: GRADUALLY IMPROVING
CLINICAL_PROGRESSION: GRADUALLY IMPROVING

## 2020-11-04 ASSESSMENT — PAIN DESCRIPTION - PAIN TYPE
TYPE: SURGICAL PAIN

## 2020-11-04 ASSESSMENT — PAIN DESCRIPTION - LOCATION
LOCATION: HIP

## 2020-11-04 ASSESSMENT — ENCOUNTER SYMPTOMS: SHORTNESS OF BREATH: 0

## 2020-11-04 NOTE — CARE COORDINATION
[facility]  Received referral from ESAU CHEEK NP   Faxed orders to [address] care to see patient by   11/5/2020  Júnior tSinson LPN    [facility] CTN Cell [phone], Fax [phone]

## 2020-11-04 NOTE — PROGRESS NOTES
Data- discharge order received, patient verbalized agreement to discharge, disposition to previous residence, needs noted for HHC/DME and informed Danika Martínez NP. Action- discharge instructions prepared and given to patient and wife, patient and wife verbalized understanding. Medication information packet given r/t NEW and/or CHANGED prescriptions emphasizing name/purpose/side effects, pt verbalized understanding. Discharge instruction summary: Diet- general, Activity- wbat, Primary Care Physician as follows: Carey Castillo -[phone]. f/u appointment with orthopedic office noted below, immunizations reviewed and discussed with patient, prescription medications to be filled by retail pharmacy and then delivered. Inpatient surgical procedure precautions reviewed: anterior hip precautions. Neurovascular check performed and patient is WNLs, denies numbness/tingling in extremties. Incision site covered by dry dressing assessed and is  Intact with small amount sanguinous drainage noted, no signs of redness, or odor noted. patient's bedside RN yesica notified of patient completing discharge instructions. incentive spirometer provided to patient and educated on purpose and use, patient demonstrated understanding. Response-  Medications delivered to patient via meds to bed program. Disposition is home with Rashad Ayers (DME  delivered to patient), to be transported by wife gera, no complications.      Future Appointments   Date Time Provider Cruz Valdovinos   11/19/2020  3:00 PM MD Rosa M Linton MMA     Electronically signed by Maryam Brady RN on 11/4/2020 at 3:12 PM

## 2020-11-04 NOTE — H&P
Preoperative H&P Update     The patient's History and Physical in the medical record dated 11/4/20 was reviewed by me today. I reviewed the HPI, medications, allergies, reason for surgery, diagnosis and treatment plan and there has been no change. The patient was evaluated by me today. Prior to Visit Medications    Medication Sig Taking? Authorizing Provider   naproxen (NAPROSYN) 500 MG tablet Take 1 tablet by mouth 2 times daily (with meals) Yes Kunal Harris MD   amLODIPine (NORVASC) 10 MG tablet TAKE ONE TABLET BY MOUTH DAILY Yes Ron Myers MD   DUPIXENT 300 MG/2ML SOSY injection Inject 300 mg into the skin once a week  Yes Historical Provider, MD   sildenafil (REVATIO) 20 MG tablet Take 1 tablet by mouth daily as needed (sexual activity)  Patient taking differently: Take 20 mg by mouth as needed (sexual activity)  Yes Ron Myers MD       Physical exam findings for this update include:  Vitals:    11/04/20 0629   BP: (!) 142/99   Pulse: 70   Resp: 18   Temp: 97 °F (36.1 °C)   SpO2: 96%     Airway is intact Chest: breathing comfortably  Heart: regular rate and rhythm. Examination of the body region where surgery is to be performed shows skin is intact at the operative site. The risk, benefits, and alternatives of the proposed procedure have been explained to the patient (or appropriate guardian) and understanding verbalized. All questions answered. Patient wishes to proceed. The patient was counseled at length about the risks of connor Covid-19 during their perioperative period and any recovery window from their procedure. The patient was made aware that connor Covid-19  may worsen their prognosis for recovering from their procedure  and lend to a higher morbidity and/or mortality risk. All material risks, benefits, and reasonable alternatives including postponing the procedure were discussed.  The patient does wish to proceed with the procedure at this

## 2020-11-04 NOTE — PLAN OF CARE
Problem: Cardiac:  Goal: Ability to maintain vital signs within normal range will improve  Description: Ability to maintain vital signs within normal range will improve  Outcome: Completed     Vitals:    11/04/20 1045 11/04/20 1100 11/04/20 1256 11/04/20 1336   BP:   116/74 117/73   Pulse:   80 77   Resp:   16 16   Temp:       TempSrc:       SpO2: 93% 92% 94% 92%   Weight:       Height:         Vital signs WNLs. Will continue to monitor and reassess. Problem: Discharge Planning:  Goal: Knowledge of discharge instructions  Description: Knowledge of discharge instructions  Outcome: Completed   Patient educated on discharge plan and assessed to determine that needs are being met. Questions answered for patient. Patient encouraged to ask questions and voice concerns/comments in regards to barriers for discharge and any other questions about the plan of care. Problem: Infection - Surgical Site:  Goal: Signs of wound healing will improve  Description: Signs of wound healing will improve  Outcome: Completed  dry dressing assessed and is  Intact with small amount sanguinous drainage noted, no signs of redness,  or odor noted. Problem: Mobility - Impaired:  Goal: Achieve maximum mobility level  Description: Achieve maximum mobility level  Outcome: Completed   Mobility is improving, patient is able to ambulate in room and to bathroom with walker and x1 assistance. Will cont to monitor and reassess. Problem: Pain - Acute:  Goal: Pain level will decrease  Description: Pain level will decrease  Outcome: Completed   Pt assessed for pain. Pt in pain and assessed with 0-10 pain rating scale. Pt given prescribed analgesic for pain. (See eMar) Pt satisfied with pain relief thus far. Will reassess and continue to monitor.    Electronically signed by Saroj Monahan RN on 11/4/2020 at 3:11 PM

## 2020-11-04 NOTE — PROGRESS NOTES
[address] [facility] Orthopedic Surgery   Progress Note      S/P :  SUBJECTIVE  In bed in SDS area. Alert and oriented. Wife at bedside. Pain is   described in right hip and with the intensity of mild. Pain is described as aching. OBJECTIVE              Physical                      VITALS:  BP (!) 144/92   Pulse 72   Temp 97.3 °F (36.3 °C) (Oral)   Resp 16   Ht 5' 8\" (1.727 m)   Wt 177 lb 2.2 oz (80.3 kg)   SpO2 92%   BMI 26.93 kg/m²                     MUSCULOSKELETAL:  right foot NVI. Wiggles toes to command. Pedal pulses are palpable. NEUROLOGIC:                                  Sensory:  Touch:  Right Lower Extremity:  normal                                                 Surgical wound appears with scant light red drainage on gauze and tape dressing right hip. Ice applied. MARSHAL hose on.      Data       CBC:   Lab Results   Component Value Date    WBC 5.8 10/19/2020    RBC 5.10 10/19/2020    HGB 15.0 10/19/2020    HCT 44.5 10/19/2020    MCV 87.2 10/19/2020    MCH 29.3 10/19/2020    MCHC 33.6 10/19/2020    RDW 13.2 10/19/2020     10/19/2020    MPV 9.1 10/19/2020        WBC:    Lab Results   Component Value Date    WBC 5.8 10/19/2020        Hemoglobin/Hematocrit:    Lab Results   Component Value Date    HGB 15.0 10/19/2020    HCT 44.5 10/19/2020        PT/INR:    Lab Results   Component Value Date    PROTIME 11.3 10/19/2020    INR 0.97 10/19/2020              Current Inpatient Medications             Current Facility-Administered Medications: 0.9 % sodium chloride infusion, , Intravenous, Continuous  sodium chloride flush 0.9 % injection 10 mL, 10 mL, Intravenous, 2 times per day  sodium chloride flush 0.9 % injection 10 mL, 10 mL, Intravenous, PRN  HYDROmorphone (DILAUDID) injection 0.25 mg, 0.25 mg, Intravenous, Q5 Min PRN  HYDROmorphone (DILAUDID) injection 0.5 mg, 0.5 mg, Intravenous, Q5 Min PRN  ondansetron (ZOFRAN) injection 4 mg, 4 mg, Intravenous, Once PRN  labetalol (NORMODYNE;TRANDATE) injection 5 mg, 5 mg, Intravenous, Q10 Min PRN  hydrALAZINE (APRESOLINE) injection 5 mg, 5 mg, Intravenous, Q15 Min PRN  meperidine (DEMEROL) injection 12.5 mg, 12.5 mg, Intravenous, Q5 Min PRN  povidone-iodine (BETADINE) 10 % 30 mL in sodium chloride 0.9 % 1,000 mL irrigation, , , Continuous PRN  tranexamic acid (CYKLOKAPRON) 1,000 mg in sodium chloride 0.9 % 50 mL IVPB, 1,000 mg, Intravenous, Once  acetaminophen (TYLENOL) tablet 1,000 mg, 1,000 mg, Oral, Once  ceFAZolin (ANCEF) 2 g in dextrose 5 % 100 mL IVPB, 2 g, Intravenous, Once  oxyCODONE (ROXICODONE) immediate release tablet 5 mg, 5 mg, Oral, Q6H PRN  oxyCODONE HCl (OXY-IR) immediate release tablet 10 mg, 10 mg, Oral, Q6H PRN    ASSESSMENT AND PLAN      Post right AMI, stable exam  DVT prophylaxis ordered, Lovenox as inpt then switch to ASA 81mg twice at day for 30 days for DVT prophylaxis  Reviewed with patient importance of MARSHAL hose on daily/ off at  Night for 2 weeks as well and continue anticoagulant medication at home as ordered to reduce risk of postoperative DVT or PE clots. Pt verbalized understanding.   PT OT for ADL's and ambulation as tolerated, anterior hip precautions  SS for DC planning, home with home care today  IV or PO pain med as ordered    Dee Singh  11/4/2020  11:13 AM

## 2020-11-04 NOTE — PROGRESS NOTES
Patient admitted to PACU from OR. Patient asleep, opens eyes to name and oral airway removed. Resp easy un labored on 2LNC wit SAO2 96%. Right hip dressing dry and intact with ice pack on. Bilateral pedal pulses palpable. IV patent to right forearm. Moving all extremities to command. Monitor in SR.  VSS.

## 2020-11-04 NOTE — PROGRESS NOTES
Patient states pain level now 4-5 of 10 and tolerable. Patient dozing off and on. Patient denies C/O nausea. Resp easy unlabored.

## 2020-11-04 NOTE — PROGRESS NOTES
Physical Therapy    Facility/Department: [facility] OR  Initial Assessment/Discharge note    NAME: Cynthia Curran  : 1965  MRN: [id number]    Date of Service: 2020    Assessment / Discharge Recommendations:  -managed well with OOB to ambulate on rolling walker  -following the anterior hip precautions properly (provided written and graphical copies for home)   -instructed his wife for assisting with car transfers and on steps to main floor using one rail and close SBA  -issued rolling walker for home use- instructed in care and use  -instructed in initial HEP and general activity to do until Home PT takes charge of care    Body structures, Functions, Activity limitations: Decreased functional mobility ; Decreased ADL status; Decreased strength  Prognosis: Good  Decision Making: Low Complexity  REQUIRES PT FOLLOW UP: Yes  Activity Tolerance  Activity Tolerance: Patient Tolerated treatment well       Patient Diagnosis(es): The encounter diagnosis was Osteoarthritis of right hip, unspecified osteoarthritis type. has a past medical history of Arthritis, Asthma, and High blood pressure. has a past surgical history that includes Inguinal hernia repair (Left, 4.28.); Knee arthroscopy; hernia repair (Right); Cystocopy (2017); Ureter stent placement (Left, 2017); joint replacement (Right, 2015); and back surgery ().     Restrictions  Restrictions/Precautions  Restrictions/Precautions: Fall Risk, Weight Bearing  Lower Extremity Weight Bearing Restrictions  Right Lower Extremity Weight Bearing: Weight Bearing As Tolerated  Position Activity Restriction  Hip Precautions: No hip flexion > 90 degrees, No hip extension, No hip external rotation, No ADduction  Vision/Hearing  Vision: Within Functional Limits  Hearing: Within functional limits     Subjective  General  Chart Reviewed: Yes  Patient assessed for rehabilitation services?: Yes  Additional Pertinent Hx: here for elective THR right  Response To Previous Treatment: Not applicable  Family / Caregiver Present: Yes(wife)  Follows Commands: Within Functional Limits  Subjective  Subjective: arrived to room along with OT to PACU to patient resting in bed - alert oriented and ready for PT OT session and to assess readiness for discharge to home  Pain Screening  Patient Currently in Pain: (minimal pain)  Orientation  Orientation  Overall Orientation Status: Within Functional Limits  Social/Functional History  Social/Functional History  Lives With: Family  Type of Home: House  Home Layout: Two level, Able to Live on Main level with bedroom/bathroom, Laundry in basement  Home Access: Stairs to enter with rails  Entrance Stairs - Number of Steps: 9  Bathroom Shower/Tub: Walk-in shower, Doors  Bathroom Toilet: Standard  Bathroom Accessibility: Walker accessible  Home Equipment: Crutches  ADL Assistance: Independent  Homemaking Assistance: Independent  Ambulation Assistance: Independent  Transfer Assistance: Independent  Active : Yes  Mode of Transportation: Car  Cognition   Cognition  Overall Cognitive Status: WFL  Objective  ROM and strength non-surgical limbs grossly wfl  Motor Control  Gross Motor?: WFL  Sensation  Overall Sensation Status: WFL  Bed mobility  Supine to Sit: Stand by assistance;Contact guard assistance  Sit to Supine: Stand by assistance;Contact guard assistance  Transfers  Sit to Stand: Stand by assistance  Stand to sit: Stand by assistance  Ambulation  Ambulation?: Yes  Ambulation 1  Surface: level tile  Device: Rolling Walker  Assistance: Stand by assistance  Quality of Gait: step through pattern with good heel contact and good weight bearing  Distance: ~80 feet overall  Comments: stable on the walker  Stairs/Curb  Stairs?: No(instructed in process - to do as prior to admission one step at a time)     Balance  Comments: steady with transfers and ambulation  Exercises  Quad Sets: 10 per hour  Gluteal Sets: 10 per hour  Ankle Pumps: 30 per hour in easy pace  Comments: encouraged practice with the spirometer at home for a few days     Plan   Plan  Times per week: home today  Safety Devices  Type of devices: Call light within reach, Left in bed, Nurse notified(remains in PACU in care of nursing staff)    Goals  Short term goals  Time Frame for Short term goals: today  Short term goal 1: bed mobility at [address] term goal 2: transfers at [address] term goal 3: ambulation at a rolling walker wbat for basic household distances  Short term goal 4: exercises at close supervision (his wife will assist)  Patient Goals   Patient goals : relief of chronic right hip pain       Therapy Time   Individual Concurrent Group Co-treatment   Time In       1340   Time Out       1420   Minutes       MOOK Tuttle

## 2020-11-04 NOTE — PROGRESS NOTES
CLINICAL PHARMACY NOTE: MEDS TO [facility] Select Patient?: No  Total # of Prescriptions Filled: 3   The following medications were delivered to the patient:  Discharge Medication List as of 11/4/2020  2:49 PM      START taking these medications    Details   oxyCODONE (ROXICODONE) 5 MG immediate release tablet Take 1-2 tablets by mouth every 6 hours as needed for Pain for up to 5 days. , Disp-40 tablet,R-0Print      cephALEXin (KEFLEX) 500 MG capsule Take 1 capsule by mouth See Admin Instructions Take one capsule at 9pm today and one capsule at 9am tomorrow, Disp-2 capsule,R-0Print      aspirin EC 81 MG EC tablet Take 1 tablet by mouth 2 times daily Take for DVT blood clot prophylaxis. Please avoid missing doses. , Disp-60 tablet,R-0Print         ·   ·   Total # of Interventions Completed: 0  Time Spent (min): 30    Additional Documentation:

## 2020-11-04 NOTE — ANESTHESIA POSTPROCEDURE EVALUATION
Department of Anesthesiology  Postprocedure Note    Patient: Win Gastelum  MRN: [id number]  YOB: 1965  Date of evaluation: 11/4/2020  Time:  10:38 AM     Procedure Summary     Date:  11/04/20 Room / Location:  [address]    Anesthesia Start:  5652 Anesthesia Stop:  4889    Procedure:  RIGHT ANTERIOR TOTAL HIP REPLACEMENT WITH C-ARM (Right ) Diagnosis:  (RIGHT HIP ARTHRITIS)    Surgeon:  Tarsha Argueta MD Responsible Provider:  Rosa Maria Barry MD    Anesthesia Type:  general ASA Status:  2          Anesthesia Type: general    Tiki Phase I: Tiki Score: 9    Tiki Phase II: Tiki Score: 9    Last vitals: Reviewed and per EMR flowsheets.        Anesthesia Post Evaluation    Patient location during evaluation: PACU  Level of consciousness: awake and alert  Airway patency: patent  Nausea & Vomiting: no nausea and no vomiting  Complications: no  Cardiovascular status: blood pressure returned to baseline  Respiratory status: acceptable  Hydration status: euvolemic  Comments: Postoperative Anesthesia Note    Name:    Win Gastelum  MRN:      [id number]    Patient Vitals in the past 12 hrs:  11/04/20 1029, BP:(!) 144/92, Temp:97.3 °F (36.3 °C), Temp src:Oral, Pulse:72, Resp:16, SpO2:97 %  11/04/20 1014, Pulse:84, Resp:16, SpO2:95 %  11/04/20 1012, Pulse:77, Resp:13, SpO2:94 %  11/04/20 1011, Pulse:68, Resp:9, SpO2:94 %  11/04/20 1010, Pulse:64, Resp:8, SpO2:96 %  11/04/20 1009, Pulse:61, Resp:14, SpO2:98 %  11/04/20 1008, Pulse:73, Resp:19, SpO2:98 %  11/04/20 1007, Pulse:60, Resp:10, SpO2:94 %  11/04/20 1006, Pulse:83, Resp:15, SpO2:(!) 88 %  11/04/20 1005, Pulse:67, Resp:9, SpO2:91 %  11/04/20 1004, Pulse:88, Resp:27, SpO2:94 %  11/04/20 1003, Pulse:69, Resp:17, SpO2:96 %  11/04/20 1002, BP:(!) 131/97, Pulse:83, Resp:18, SpO2:96 %  11/04/20 1001, Pulse:66, Resp:10, SpO2:95 %  11/04/20 1000, Pulse:65, Resp:8, SpO2:95 %  11/04/20 0959, Pulse:66, Resp:11, SpO2:95 %  11/04/20 0958, Pulse:68, Resp:10, SpO2:94 %  11/04/20 0957, Pulse:67, Resp:12, SpO2:95 %  11/04/20 0956, Pulse:66, Resp:13, SpO2:95 %  11/04/20 0955, Pulse:87, Resp:19, SpO2:95 %  11/04/20 0954, Pulse:67, Resp:10, SpO2:95 %  11/04/20 0953, Pulse:73, Resp:9, SpO2:94 %  11/04/20 0952, Pulse:66, Resp:8, SpO2:96 %  11/04/20 0951, Pulse:75, Resp:20, SpO2:95 %  11/04/20 0950, Pulse:67, Resp:11, SpO2:97 %  11/04/20 0949, Temp:97 °F (36.1 °C), Temp src:Temporal, Pulse:71, Resp:13, SpO2:97 %  11/04/20 0948, Pulse:84, Resp:14, SpO2:99 %  11/04/20 0947, Pulse:62, Resp:12, SpO2:95 %  11/04/20 0946, BP:(!) 150/95, Pulse:66, Resp:8, SpO2:95 %  11/04/20 0945, Pulse:71, Resp:15, SpO2:95 %  11/04/20 0944, Pulse:75, Resp:14, SpO2:98 %  11/04/20 0943, Pulse:74, Resp:19  11/04/20 0942, Pulse:66, Resp:12, SpO2:100 %  11/04/20 0941, Pulse:74, Resp:20, SpO2:96 %  11/04/20 0940, Pulse:67, Resp:9, SpO2:98 %  11/04/20 0939, Pulse:67, Resp:8, SpO2:98 %  11/04/20 0938, Pulse:66, Resp:8, SpO2:98 %  11/04/20 0937, Pulse:66, Resp:8, SpO2:97 %  11/04/20 0936, Pulse:68, Resp:8, SpO2:97 %  11/04/20 0935, Pulse:68, Resp:11, SpO2:94 %  11/04/20 0934, Pulse:69, Resp:10, SpO2:95 %  11/04/20 0933, Pulse:67, Resp:11, SpO2:95 %  11/04/20 0932, BP:(!) 135/100, Pulse:74, Resp:16, SpO2:94 %  11/04/20 0931, Pulse:71, Resp:10, SpO2:98 %  11/04/20 0930, Pulse:69, Resp:13, SpO2:98 %  11/04/20 0929, Pulse:77, Resp:13, SpO2:98 %  11/04/20 0928, Pulse:76, Resp:14, SpO2:97 %  11/04/20 0927, BP:(!) 148/96, Pulse:79, Resp:26, SpO2:96 %  11/04/20 0926, Pulse:66, Resp:9, SpO2:97 %  11/04/20 0925, Pulse:66, Resp:9, SpO2:96 %  11/04/20 0924, Pulse:67, Resp:9, SpO2:97 %  11/04/20 0923, Pulse:64, Resp:10, SpO2:97 %  11/04/20 0922, BP:(!) 141/98, Pulse:66, Resp:10, SpO2:96 %  11/04/20 0921, Pulse:70, Resp:11, SpO2:96 %  11/04/20 0920, Pulse:68, Resp:9, SpO2:97 %  11/04/20 0919, Pulse:67, Resp:8, SpO2:97 %  11/04/20 0918, Pulse:69, Resp:12, SpO2:98 %  11/04/20 0917, BP:135/89, Pulse:68, Resp:14  11/04/20 0916, Pulse:75, Resp:16  11/04/20 0915, Pulse:65, Resp:9, SpO2:97 %  11/04/20 0914, Pulse:67, Resp:9, SpO2:97 %  11/04/20 0913, Pulse:65, Resp:10, SpO2:96 %  11/04/20 0912, Pulse:65, Resp:9, SpO2:96 %  11/04/20 0911, BP:128/85, Pulse:65, Resp:11, SpO2:96 %  11/04/20 0910, SpO2:96 %  11/04/20 0908, BP:128/85, Temp:97.4 °F (36.3 °C), Temp src:Temporal, Pulse:62, Resp:15, SpO2:96 %  11/04/20 0629, BP:(!) 142/99, Temp:97 °F (36.1 °C), Temp src:Temporal, Pulse:70, Resp:18, SpO2:96 %, Height:5' 8\" (1.727 m), Weight:177 lb 2.2 oz (80.3 kg)     LABS:    CBC  Lab Results       Component                Value               Date/Time                  WBC                      5.8                 10/19/2020 12:00 PM        HGB                      15.0                10/19/2020 12:00 PM        HCT                      44.5                10/19/2020 12:00 PM        PLT                      238                 10/19/2020 12:00 PM   RENAL  Lab Results       Component                Value               Date/Time                  NA                       139                 10/19/2020 12:00 PM        K                        3.9                 10/19/2020 12:00 PM        CL                       102                 10/19/2020 12:00 PM        CO2                      27                  10/19/2020 12:00 PM        BUN                      10                  10/19/2020 12:00 PM        CREATININE               0.9                 10/19/2020 12:00 PM        GLUCOSE                  99                  10/19/2020 12:00 PM   COAGS  Lab Results       Component                Value               Date/Time                  PROTIME                  11.3                10/19/2020 12:00 PM        INR                      0.97                10/19/2020 12:00 PM        APTT                     31.2                10/19/2020 12:00 PM     Intake & Output:  @03BPDU@    Nausea & Vomiting:  No    Level of Consciousness:  Awake    Pain Assessment:  Adequate analgesia    Anesthesia Complications:  No apparent anesthetic complications    SUMMARY      Vital signs stable  OK to discharge from Stage I post anesthesia care.   Care transferred from Anesthesiology department on discharge from perioperative area

## 2020-11-04 NOTE — BRIEF OP NOTE
Brief Postoperative Note      Patient: Danni Newman  YOB: 1965  MRN: [id number]    Date of Procedure: 11/4/2020    Pre-Op Diagnosis: RIGHT HIP ARTHRITIS    Post-Op Diagnosis: Same       Procedure(s):  RIGHT ANTERIOR TOTAL HIP REPLACEMENT WITH C-ARM    Surgeon(s):  MD Richard Cote MD    Assistant:  Physician Assistant: STEPHANIE Colunga    Anesthesia: General    Estimated Blood Loss (mL): 152     Complications: None    Specimens:   ID Type Source Tests Collected by Time Destination   A : Right femoral head Tissue Tissue SURGICAL PATHOLOGY Silvio Middleton MD 11/4/2020 0827        Implants:  Implant Name Type Inv.  Item Serial No.  Lot No. LRB No. Used Action   CUP ACET [id number] HIP GRIPTION KAYE CEMENTLESS FIX SECT SER  CUP ACET [id number] HIP GRIPTION KAYE CEMENTLESS FIX SECT SER  [facility] [id number] Right 1 Implanted   HEAD FEM [id number] +1.5MM OFFSET 12/14 TAPR HIP CERAMIC  HEAD FEM [id number] +1.5MM OFFSET 12/14 TAPR HIP CERAMIC  [facility] [id number] Right 1 Implanted   STEM FEM SZ 6 L107MM 12/14 TAPR HI OFFSET HIP DUOFIX CLLRD  STEM FEM SZ 6 L107MM 12/14 TAPR HI OFFSET HIP DUOFIX CLLRD  [facility] [id number] Right 1 Implanted   LINER ACET OD54MM ID36MM HIP ALTRX PINN  LINER ACET OD54MM ID36MM HIP ALTRX PINN  [facility] [id number] Right 1 Implanted         Drains: * No LDAs found *    Findings: OA R hip      Electronically signed by Silvio Middleton MD on 11/4/2020 at 9:00 AM

## 2020-11-04 NOTE — PROGRESS NOTES
Occupational Therapy   Occupational Therapy Initial Assessment  Date: 2020   Patient Name: Daniel Sanders  MRN: [id number]     : 1965    Date of Service: 2020    Assessment: Pt is 50 y.o. M who presents with arthritis of R hip. Pt is s/p R AMI and is WBAT with anterolateral precautions. PTA pt lives with family in two story home with 9 JOAO. Pt reports independence in self-care tasks, homemaking responsibilities, and functional mobility with no device. Currently, pt presents with ROM/strength in [facility] for self-care and transfers. Pt completed bed mobility with SBA/CGA and sit <> stand transfers with SBA/CGA. Pt completed functional mobility with RW with SBA/CGA, no overt LOB. Pt completed LB dressing with use of AE and cues, anticipate requiring min A for MARSHAL hose. Anticipate pt safe to d/c home with / supervision/assist and home health OT. Discharge Recommendations:  24 hour supervision or assist, Home with Home health OT, S Level 1     [address] [place] [address]: LEVEL 1 STANDARD    - Initial home health evaluation to occur within 24-48 hours, in patient home   - Therapy to evaluate with goal of regaining prior level of functioning   - Therapy to evaluate if patient has [address] needs for personal care    Assessment   Performance deficits / Impairments: Decreased functional mobility ; Decreased ADL status; Decreased balance  Assessment: Pt is 50 y.o. M who presents with arthritis of R hip. Pt is s/p R AMI and is WBAT with anterolateral precautions. PTA pt lives with family in two story home with 9 JOAO. Pt reports independence in self-care tasks, homemaking responsibilities, and functional mobility with no device. Currently, pt presents with ROM/strength in [facility] for self-care and transfers. Pt completed bed mobility with SBA/CGA and sit <> stand transfers with SBA/CGA. Pt completed functional mobility with RW with SBA/CGA, no overt LOB.  Pt completed LB dressing with use of AE and cues, anticipate requiring min A for MARSHAL hose. Anticipate pt safe to d/c home with 24/7 supervision/assist and home health OT. Prognosis: Good  Decision Making: Low Complexity  History: PMH: HTN, asthma  Exam: ADLs, transfers, func mob, bed mob  Assistance / Modification: SBA/CGA for mobility, min A for ADLs  OT Education: OT Role;Transfer Training;Plan of Care;Precautions; ADL Adaptive Strategies  REQUIRES OT FOLLOW UP: Yes  Activity Tolerance  Activity Tolerance: Patient Tolerated treatment well  Safety Devices  Safety Devices in place: Yes  Type of devices: Nurse notified;Gait belt;Call light within reach; Left in bed           Patient Diagnosis(es): The encounter diagnosis was Osteoarthritis of right hip, unspecified osteoarthritis type. has a past medical history of Arthritis, Asthma, and High blood pressure. has a past surgical history that includes Inguinal hernia repair (Left, 4.28.11); Knee arthroscopy; hernia repair (Right); Cystocopy (02/06/2017); Ureter stent placement (Left, 02/07/2017); joint replacement (Right, 12-); and back surgery (2010). Restrictions  Restrictions/Precautions  Restrictions/Precautions: Fall Risk, Weight Bearing  Lower Extremity Weight Bearing Restrictions  Right Lower Extremity Weight Bearing: Weight Bearing As Tolerated  Position Activity Restriction  Hip Precautions: No hip flexion > 90 degrees, No hip extension, No hip external rotation, No ADduction    Subjective   General  Chart Reviewed: Yes  Patient assessed for rehabilitation services?: Yes  Additional Pertinent Hx: Pt is 50 y.o. M who presents with arthritis of R hip. Pt is s/p R AMI and is WBAT with anterolateral precautions. PMH: HTN,, asthma  Family / Caregiver Present: Yes(Wife)  Referring Practitioner: Ailin Newell MD  Diagnosis: Arthritis of R hip  Subjective  Subjective: Pt met bedside, agreeable for therapy evaluation and OOB activity.   Patient Currently in Pain: (Reporting minimal pain)  Pain Assessment  Pain Assessment: 0-10  Pain Level: 2  Patient's Stated Pain Goal: 6  Pain Type: Surgical pain  Pain Location: Hip  Pain Orientation: Right  Pain Descriptors: Aching; Sore  Pain Frequency: Continuous  Pain Onset: On-going  Clinical Progression: Gradually improving  Non-Pharmaceutical Pain Intervention(s): Cold applied;Distraction; Emotional support  Response to Pain Intervention: Patient Satisfied  Vital Signs  Pulse: 77  Heart Rate Source: Monitor  Resp: 16  BP: 117/73  Patient Position: High fowlers  Level of Consciousness: Alert  Patient Currently in Pain: (Reporting minimal pain)  Oxygen Therapy  SpO2: 92 %  Pulse Oximeter Device Mode: Intermittent  Pulse Oximeter Device Location: Right;Hand;Finger  O2 Device: None (Room air)     Social/Functional History  Social/Functional History  Lives With: Family  Type of Home: House  Home Layout: Two level, Able to Live on Main level with bedroom/bathroom, Laundry in basement  Home Access: Stairs to enter with rails  Entrance Stairs - Number of Steps: 9  Bathroom Shower/Tub: Walk-in shower, Doors  Bathroom Toilet: Standard  Bathroom Accessibility: Walker accessible  Home Equipment: Crutches  ADL Assistance: Independent  Homemaking Assistance: Independent  Ambulation Assistance: Independent  Transfer Assistance: Independent  Active : Yes  Mode of Transportation: Car       Objective   Vision: Within Functional Limits  Hearing: Within functional limits      Orientation  Overall Orientation Status: Within Functional Limits     Standing Balance  Time: ~3-4 minutes  Activity: Func mob, transfers, and ADL tasks    Functional Mobility  Functional - Mobility Device: Rolling Walker  Activity: To/from bathroom  Assist Level: Contact guard assistance(SBA/CGA)  Functional Mobility Comments: Pt completed functional mobility with RW with SBA/CGA, steady with no overt LOB. Cues for RW use.     Toilet Transfers  Toilet Transfers Comments: Stood at commode to urinate    ADL  UE Dressing: (Completed with setup while seated)  LE Dressing: Minimal assistance(Use of reacher to doff footie socks, sock aid to don. Discussed assist for MARSHAL hose. Pt threaded pants with reacher and managed over hips with CGA)  Toileting: Contact guard assistance(Stood at commode to urinate with SBA)  Additional Comments: PTA pt reports independence in self-care tasks. Anticipate pt to require min A for MARSHAL hose, SBA for UB ADLs, CGA for toileting. Tone RUE  RUE Tone: Normotonic  Tone LUE  LUE Tone: Normotonic  Coordination  Movements Are Fluid And Coordinated: Yes     Bed mobility  Supine to Sit: Stand by assistance;Contact guard assistance(HOB elevated, use of gait belt as leg )  Sit to Supine: Stand by assistance;Contact guard assistance(HOB elevated)     Transfers  Sit to stand: Stand by assistance;Contact guard assistance  Stand to sit: Stand by assistance;Contact guard assistance  Transfer Comments: SBA/CGA for sit <> stand from EOB to RW and sit to chair with armrests     Cognition  Overall Cognitive Status: WFL        Sensation  Overall Sensation Status: WFL        LUE AROM (degrees)  LUE AROM : WFL  Left Hand AROM (degrees)  Left Hand AROM: WFL  RUE AROM (degrees)  RUE AROM : WFL  Right Hand AROM (degrees)  Right Hand AROM: WFL     LUE Strength  Gross LUE Strength: WFL  RUE Strength  Gross RUE Strength: WFL          Plan   Plan  Plan Comment: Pt plans to d/c home from PACU with 24/7 supervision/assist and home health OT. AM-PAC Score    Clayton Sousa scored a 20/24 on the AM-PAC ADL Inpatient form. Current research shows that an AM-PAC score of 18 or greater is typically associated with a discharge to the patient's home setting. Based on the patient's AM-PAC score, and their current ADL deficits, it is recommended that the patient have 2-3 sessions per week of Occupational Therapy at d/c to increase the patient's independence.   At this time, this patient demonstrates the endurance and safety to discharge home with home health OT (home vs OP services) and a follow up treatment frequency of 2-3x/wk. Please see assessment section for further patient specific details. If patient discharges prior to next session this note will serve as a discharge summary. Please see below for the latest assessment towards goals. AM-PAC Inpatient Daily Activity Raw Score: 20 (11/04/20 1459)  AM-PAC Inpatient ADL T-Scale Score : 42.03 (11/04/20 1459)  ADL Inpatient CMS 0-100% Score: 38.32 (11/04/20 1459)  ADL Inpatient CMS G-Code Modifier : CJ (11/04/20 1459)    Goals  Short term goals  Time Frame for Short term goals: Pt plans to d/c this date from PACU with 24/7 supervision/assist and home health OT. Therapy Time   Individual Concurrent Group Co-treatment   Time In       1340   Time Out       1420   Minutes       40   Timed Code Treatment Minutes: 25 Minutes(15 min eval)     If pt is discharged prior to next OT session, this note will serve as the discharge summary.     Jacquelin Elizalde, JOANIEK/F#[id number]

## 2020-11-04 NOTE — OP NOTE
Operative Note      Patient: Cam Benitez  YOB: 1965  MRN: [id number]    Date of Procedure: 11/4/2020    Pre-Op Diagnosis: RIGHT HIP ARTHRITIS    Post-Op Diagnosis: Same       Procedure(s):  RIGHT ANTERIOR TOTAL HIP REPLACEMENT WITH C-ARM    Surgeon(s):  MD Alexi Lyn MD    Assistant:   Physician Assistant: STEPHANIE Baez    Anesthesia: General    Estimated Blood Loss (mL): 890     Complications: None    Specimens:   ID Type Source Tests Collected by Time Destination   A : Right femoral head Tissue Tissue SURGICAL PATHOLOGY Valerie Pierson MD 11/4/2020 0827        Implants:  Implant Name Type Inv. Item Serial No.  Lot No. LRB No. Used Action   CUP ACET [id number] HIP GRIPTION KAYE CEMENTLESS FIX SECT SER  CUP ACET [id number] HIP GRIPTION KAYE CEMENTLESS FIX SECT SER  [facility] [id number] Right 1 Implanted   HEAD FEM [id number] +1.5MM OFFSET 12/14 TAPR HIP CERAMIC  HEAD FEM [id number] +1.5MM OFFSET 12/14 TAPR HIP CERAMIC  [facility] [id number] Right 1 Implanted   STEM FEM SZ 6 L107MM 12/14 TAPR HI OFFSET HIP DUOFIX CLLRD  STEM FEM SZ 6 L107MM 12/14 TAPR HI OFFSET HIP DUOFIX CLLRD  [facility] [id number] Right 1 Implanted   LINER ACET OD54MM ID36MM HIP ALTRX PINN  LINER ACET OD54MM ID36MM HIP ALTRX PINN  [facility] [id number] Right 1 Implanted         Drains: * No LDAs found *    Findings: OA R hip    Detailed Description of Procedure:   OPERATIVE REPORT              . Patient:  Cam Benitez    YOB: 1965  Date of Service:  11/4/2020   [facility]:  [facility]      Preoperative Diagnosis:    Degenerative Arthritis Right  Hip 715.15    Postoperative Diagnosis:  Same.     Procedure:   Right Total Hip Replacement    Anesthesia:   General     Blood Loss:  300 cc    Assistant: Charley Sams, [facility]    Modifier:          The patient was brought to the operating room and after anteversion. The 1.5 mm 36 mm ceramic head segment was place over the doris taper. The hip was reduced. The C arm was brought in and confirmed satisfactory postion of the implants and leg length equity. The wound was irrigated and  hemostasis was obtained. The wound was injected with a  a mixture of 102 mL containing 90 cc ropivacaine . 5%, 10 mg morphine, 40 mg depomedrol, and 750 mg cefuroxime. The wound was bathed with 3 grams of tranexamic acid. The wound was closed in layers a dressing was applied and the patient was brought to recovery in satisfactory condition. The patient was counseled at length about the risks of connor Covid-19 during their perioperative period and any recovery window from their procedure. The patient was made aware that connor Covid-19  may worsen their prognosis for recovering from their procedure  and lend to a higher morbidity and/or mortality risk. All material risks, benefits, and reasonable alternatives including postponing the procedure were discussed. The patient does wish to proceed with the procedure at this time. [facility].  Lyly Osborne MD      Electronically signed by Emmanuelle Vargas MD on 11/4/2020 at 9:05 AM

## 2020-11-04 NOTE — PROGRESS NOTES
Patient awake and alert. Resp easy unlabored on room air O2 with SaO2 95%. Right hip dressing dry and intact with ice pack on. Pain level 3 of 10 and tolerable. Neurovascular checks stable bilat. Patient denies C/O nausea. VSS. IV patent. Moving all extremities to command.

## 2020-11-04 NOTE — PROGRESS NOTES
SaO2 desats to 88% when patient drifts asleep. O2 replaced at [facility] with SAO2 increase to 98%

## 2020-11-04 NOTE — ANESTHESIA PRE PROCEDURE
[facility] Department of Anesthesiology  Pre-Anesthesia Evaluation/Consultation       Name:  Joan Thao  : 1965  Age:  50 y.o. MRN:  [id number]  Date: 2020           Surgeon: Surgeon(s):  Idania Sebastian MD    Procedure: Procedure(s):  RIGHT ANTERIOR TOTAL HIP REPLACEMENT WITH C-ARM     Allergies   Allergen Reactions    Seasonal Other (See Comments)     Grass/mold/pollen--pt states does not take any medication for them     Patient Active Problem List   Diagnosis    ADD (attention deficit disorder)    Prepatellar bursitis of right knee    Essential hypertension    Adjustment disorder with depressed mood    Arthritis of right knee    Nodule of right lung    Mediastinal adenopathy     Past Medical History:   Diagnosis Date    Arthritis     Asthma     as a child--no problems in past 25 years    High blood pressure      Past Surgical History:   Procedure Laterality Date    BACK SURGERY      CERVICAL [address] per Dr. Dr. Tg Matthews  2017    cysto, attempted ureteroscopy, left retrograde pyelogram    HERNIA REPAIR Right     INGUINAL HERNIA REPAIR Left 4.11    JOINT REPLACEMENT Right 2015    knee    KNEE ARTHROSCOPY      (pt unsure of which knee) -both knees scoped    URETER STENT PLACEMENT Left 2017    DR. Diallo Fragoso - 6G47     Social History     Tobacco Use    Smoking status: Never Smoker    Smokeless tobacco: Never Used   Substance Use Topics    Alcohol use: Yes     Alcohol/week: 0.0 standard drinks     Comment: socially    Drug use: No     Medications  No current facility-administered medications on file prior to encounter.       Current Outpatient Medications on File Prior to Encounter   Medication Sig Dispense Refill    naproxen (NAPROSYN) 500 MG tablet Take 1 tablet by mouth 2 times daily (with meals) 60 tablet 0    amLODIPine (NORVASC) 10 MG tablet TAKE ONE TABLET BY MOUTH DAILY 90 Height as of this encounter: 5' 8\" (1.727 m). Weight as of this encounter: 177 lb 2.2 oz (80.3 kg). CBC   Lab Results   Component Value Date    WBC 5.8 10/19/2020    RBC 5.10 10/19/2020    HGB 15.0 10/19/2020    HCT 44.5 10/19/2020    MCV 87.2 10/19/2020    RDW 13.2 10/19/2020     10/19/2020     CMP    Lab Results   Component Value Date     10/19/2020    K 3.9 10/19/2020     10/19/2020    CO2 27 10/19/2020    BUN 10 10/19/2020    CREATININE 0.9 10/19/2020    GFRAA >60 10/19/2020    LABGLOM >60 10/19/2020    GLUCOSE 99 10/19/2020    CALCIUM 9.5 10/19/2020     BMP    Lab Results   Component Value Date     10/19/2020    K 3.9 10/19/2020     10/19/2020    CO2 27 10/19/2020    BUN 10 10/19/2020    CREATININE 0.9 10/19/2020    CALCIUM 9.5 10/19/2020    GFRAA >60 10/19/2020    LABGLOM >60 10/19/2020    GLUCOSE 99 10/19/2020     POCGlucose  No results for input(s): GLUCOSE in the last 72 hours.    Coags    Lab Results   Component Value Date    PROTIME 11.3 10/19/2020    INR 0.97 10/19/2020    APTT 31.2 04/52/5849     HCG (If Applicable) No results found for: PREGTESTUR, PREGSERUM, HCG, HCGQUANT   ABGs No results found for: PHART, PO2ART, ING6OJD, NGF3OUJ, BEART, T5ONKFTE   Type & Screen (If Applicable)  No results found for: LABABO, LABRH                         BMI: Wt Readings from Last 3 Encounters:       NPO Status:   Date of last liquid consumption: 11/03/20   Time of last liquid consumption: 2100   Date of last solid food consumption: 11/03/20      Time of last solid consumption: 2100       Anesthesia Evaluation  Patient summary reviewed  Airway: Mallampati: II  TM distance: >3 FB   Neck ROM: full  Mouth opening: > = 3 FB Dental: normal exam         Pulmonary:   (+) asthma:     (-) COPD and shortness of breath                           Cardiovascular:    (+) hypertension:,     (-) valvular problems/murmurs, past MI, CAD, CABG/stent, dysrhythmias and  angina Neuro/Psych:   (+) psychiatric history:   (-) seizures, TIA and CVA           GI/Hepatic/Renal:        (-) GERD, PUD, liver disease and no renal disease       Endo/Other:        (-) diabetes mellitus               Abdominal:           Vascular: negative vascular ROS. Anesthesia Plan      general     ASA 2     (I discussed with the patient the risks and benefits of PIV, general anesthesia, IV Narcotics, PACU. All questions were answered the patient agrees with the plan.)  Induction: intravenous. Anesthetic plan and risks discussed with patient. Plan discussed with CRNA. This pre-anesthesia assessment may be used as a history and physical.    DOS STAFF ADDENDUM:    Pt seen and examined, chart reviewed (including anesthesia, drug and allergy history). No interval changes to history and physical examination. Anesthetic plan, risks, benefits, alternatives, and personnel involved discussed with patient. Patient verbalized an understanding and agrees to proceed.       Lauren Cronin MD  November 4, 2020  7:19 AM

## 2020-11-04 NOTE — PROGRESS NOTES
Huddle performed this morning including Nurse navigator Adam Joy, Physical therapist lauren, and Occupational therapist Carrie Ramesh. Discussed plan of care, discharge plan, and dme needs if applicable for orthopedic total joint patient.

## 2020-11-05 ENCOUNTER — TELEPHONE (OUTPATIENT)
Dept: ORTHOPEDICS UNIT | Age: 55
End: 2020-11-05

## 2020-11-05 NOTE — TELEPHONE ENCOUNTER
Spoke with patient regarding post discharge from hospital.    Incision status: No drainage, odor, or redness noted per patient    Edema/Swelling/Teds: edema not noted per patient, wearing teds     Pain level and status: 2/10 at rest, 6/10 with ambulation tolerable    Use of pain medications: yes ; Patient stated they are taking their pain medication oxycodone as prescribed. Use of ice therapy: yes     Blood thinner: aspirin ; Verified with patient that they are taking their anticoagulant as prescribed twice a day. Bowels: no bm , passing flatus. educated patient to take a laxative prn, patient verbalized understanding. Home Care Agency active: yes ; Claims agency to come today, spoke to agency already . Outpatient therapy: n/a    Do you have all of your medications: yes    Changes in medications: no    Ortho Vitals: n/a      No other questions/concerns at this time. Encouraged patient to call Orthopedic Nurse [phone] Jeremy Garcia or Orthopedic office if has any questions/concerns.       Follow up appointments:    Future Appointments   Date Time Provider Cruz Valdovinos   11/19/2020  3:00 PM MD Prakash Laws [facility]   Electronically signed by Consuelo Coates RN on 11/5/2020 at 2:39 PM

## 2020-11-06 ENCOUNTER — TELEPHONE (OUTPATIENT)
Dept: ORTHOPEDIC SURGERY | Age: 55
End: 2020-11-06

## 2020-11-06 NOTE — TELEPHONE ENCOUNTER
Other Sofía with [facility] is calling requesting verbal orders for pt and ot. ph [phone]

## 2020-11-18 ENCOUNTER — TELEPHONE (OUTPATIENT)
Dept: ORTHOPEDIC SURGERY | Age: 55
End: 2020-11-18

## 2020-11-18 NOTE — TELEPHONE ENCOUNTER
Other Patient is calling and would like a call back. Patient needs to be advised on medications he can take for the pain. Has stopped oxycodone and tylenol.  ph [phone]

## 2020-11-19 ENCOUNTER — OFFICE VISIT (OUTPATIENT)
Dept: ORTHOPEDIC SURGERY | Age: 55
End: 2020-11-19

## 2020-11-19 VITALS — TEMPERATURE: 97.5 F | WEIGHT: 177 LBS | BODY MASS INDEX: 26.83 KG/M2 | HEIGHT: 68 IN

## 2020-11-19 PROCEDURE — 99024 POSTOP FOLLOW-UP VISIT: CPT | Performed by: ORTHOPAEDIC SURGERY

## 2020-11-19 NOTE — PROGRESS NOTES
facility-administered medications on file prior to visit. Exam:  Temperature 97.5 °F (36.4 °C), temperature source Infrared, height 5' 8\" (1.727 m), weight 177 lb (80.3 kg). Appearance: sitting in exam room chair, appears to be in no acute distress, awake and alert  Resp: unlabored breathing on room air  Skin: warm, dry and intact with out erythema or significant increased temperature  Neuro: grossly intact both lower extremities. Intact sensation to light touch. Motor exam 4+ to 5/5 in all major motor groups. MSK: RLE - Incision is healed. Examination demonstrates negative logroll negative Stinchfield. There is brisk capillary refill. There are 2+ dorsalis pedis and posterior tibial pulses. Strength is 5/5 in hamstrings, quads, hip flexors. Imaging:  3 views of the right hip were performed and reviewed today. Significant for total hip arthroplasty prosthesis in place with no signs of osteolysis, loosening, fracture, or dislocation. Assessment:  S/p right total hip arthroplasty    Plan:  He is doing very well following total hip replacement. He will continue self-directed home physical therapy. He does not really need formal physical therapy given how well he is doing. We will see him back in 6 weeks or sooner if needed for another reassessment of his recovery. This dictation was done with [employer] dictation and may contain mechanical errors related to translation.

## 2020-12-07 ENCOUNTER — OFFICE VISIT (OUTPATIENT)
Dept: ORTHOPEDIC SURGERY | Age: 55
End: 2020-12-07
Payer: COMMERCIAL

## 2020-12-07 VITALS — BODY MASS INDEX: 26.83 KG/M2 | WEIGHT: 177 LBS | TEMPERATURE: 97.3 F | HEIGHT: 68 IN

## 2020-12-07 PROCEDURE — 20610 DRAIN/INJ JOINT/BURSA W/O US: CPT | Performed by: ORTHOPAEDIC SURGERY

## 2020-12-07 PROCEDURE — 99214 OFFICE O/P EST MOD 30 MIN: CPT | Performed by: ORTHOPAEDIC SURGERY

## 2020-12-07 RX ORDER — LIDOCAINE HYDROCHLORIDE 10 MG/ML
3 INJECTION, SOLUTION INFILTRATION; PERINEURAL ONCE
Status: COMPLETED | OUTPATIENT
Start: 2020-12-07 | End: 2020-12-07

## 2020-12-07 RX ORDER — METHYLPREDNISOLONE ACETATE 40 MG/ML
80 INJECTION, SUSPENSION INTRA-ARTICULAR; INTRALESIONAL; INTRAMUSCULAR; SOFT TISSUE ONCE
Status: COMPLETED | OUTPATIENT
Start: 2020-12-07 | End: 2020-12-07

## 2020-12-07 RX ADMIN — LIDOCAINE HYDROCHLORIDE 3 ML: 10 INJECTION, SOLUTION INFILTRATION; PERINEURAL at 16:32

## 2020-12-07 RX ADMIN — METHYLPREDNISOLONE ACETATE 80 MG: 40 INJECTION, SUSPENSION INTRA-ARTICULAR; INTRALESIONAL; INTRAMUSCULAR; SOFT TISSUE at 16:32

## 2020-12-07 ASSESSMENT — ENCOUNTER SYMPTOMS
ALLERGIC/IMMUNOLOGIC NEGATIVE: 1
EYES NEGATIVE: 1
RESPIRATORY NEGATIVE: 1
GASTROINTESTINAL NEGATIVE: 1

## 2020-12-07 NOTE — PROGRESS NOTES
are palpable and 2+. Neurological: The patient has good coordination. There is no weakness or sensory deficit. Skin:    Head/Neck: inspection reveals no rashes, ulcerations or lesions. Trunk:  inspection reveals no rashes, ulcerations or lesions. Right Lower Extremity: inspection reveals no rashes, ulcerations or lesions. Left Lower Extremity: inspection reveals no rashes, ulcerations or lesions. Left hip exam is benign. Right knee is status post arthroplasty with no evidence of infection. Left knee has a moderate effusion. He has significant crepitation with range of motion 0 to about 115 degrees. Calf is soft. He has no instability. X-rays were obtained today. AP standing, PA flexed, and merchant views of the bilateral knees as well as a lateral of the left knee. These demonstrate: Right knee status post arthroplasty. Left knee has severe arthritic change with large chondral bodies anteriorly and posteriorly. Assessment:      Left knee arthritis      Plan:      He understands and ultimately he is going to require arthroplasty. He would like to avoid it this year. We will attempt an aspiration and injection today. Procedure: Under sterile technique, left knee was anesthetized in the suprapatellar pouch. I then aspirate the left knee of about 22 cc of serous fluid. Left knee was then injected with 80 mg of Depo-Medrol. He tolerated that well. Follow-up with me on an as-needed basis. This note was created using voice recognition software. It has been proofread, but occasionally errors remain. Please disregard these errors. They will be corrected as they are noted.

## 2020-12-17 RX ORDER — AMLODIPINE BESYLATE 10 MG/1
TABLET ORAL
Qty: 90 TABLET | Refills: 3 | Status: SHIPPED | OUTPATIENT
Start: 2020-12-17 | End: 2022-01-30

## 2021-01-07 ENCOUNTER — OFFICE VISIT (OUTPATIENT)
Dept: ORTHOPEDIC SURGERY | Age: 56
End: 2021-01-07

## 2021-01-07 VITALS — TEMPERATURE: 97.8 F

## 2021-01-07 DIAGNOSIS — Z96.641 HISTORY OF RIGHT HIP REPLACEMENT: Primary | ICD-10-CM

## 2021-01-07 PROCEDURE — 99024 POSTOP FOLLOW-UP VISIT: CPT | Performed by: PHYSICIAN ASSISTANT

## 2021-01-07 NOTE — PROGRESS NOTES
Subjective:      Patient ID: Brandon Zambrano is a 50 y.o.  male. Chief Complaint   Patient presents with    Post-Op Check     [facility]I 11/4/2020        HPI:  Here for follow up post op visit. S/P right hip arthroplasty. The date of procedure- 11/4/2020. Surgeon: Dr Sybil Desai  Pain: 0/10. He is back to work, climbing ladders, squatting, doing all of his activities. Review of Systems:   Negative for fever or chills. Past Medical History:   Diagnosis Date    Arthritis     Asthma     as a child--no problems in past 25 years    High blood pressure        Family History   Problem Relation Age of Onset    Crohn's Disease Mother     Diabetes Father     Arthritis Other     Cancer Other     High Blood Pressure Other        Past Surgical History:   Procedure Laterality Date    BACK SURGERY  2010    [address] per Dr. Dr. Romayne Portal  02/06/2017    cysto, attempted ureteroscopy, left retrograde pyelogram    HERNIA REPAIR Right     INGUINAL HERNIA REPAIR Left 4.28.11    JOINT REPLACEMENT Right 12-    knee    KNEE ARTHROSCOPY      2004(pt unsure of which knee) 2012-both knees scoped    TOTAL HIP ARTHROPLASTY Right 11/4/2020    RIGHT ANTERIOR TOTAL HIP REPLACEMENT WITH C-ARM performed by Artem Solano MD at [facility] Left 02/07/2017    DR. Merlene Harper - 2F37       Social History     Occupational History    Occupation:     Tobacco Use    Smoking status: Never Smoker    Smokeless tobacco: Never Used   Substance and Sexual Activity    Alcohol use:  Yes     Alcohol/week: 0.0 standard drinks     Comment: socially    Drug use: No    Sexual activity: Yes     Partners: Female       Current Outpatient Medications   Medication Sig Dispense Refill    amLODIPine (NORVASC) 10 MG tablet TAKE ONE TABLET BY MOUTH DAILY 90 tablet 3  cephALEXin (KEFLEX) 500 MG capsule Take 1 capsule by mouth See Admin Instructions Take one capsule at 9pm today and one capsule at 9am tomorrow 2 capsule 0    DUPIXENT 300 MG/2ML SOSY injection Inject 300 mg into the skin once a week       sildenafil (REVATIO) 20 MG tablet Take 1 tablet by mouth daily as needed (sexual activity) (Patient taking differently: Take 20 mg by mouth as needed (sexual activity) ) 30 tablet 5     No current facility-administered medications for this visit. Objective:   He is alert, oriented x 3, pleasant, well nourished, developed and in no acute distress. Temp 97.8 °F (36.6 °C) (Temporal)      HIP EXAM:  Examination of the right hip shows: Incision is healed. He has no pain, instability with active and passive range of motion. X Rays: not performed in the office today:     Diagnosis:        ICD-10-CM    1. History of right hip replacement 11/4/2020  Z96.641           Assessment/plan:     Assessment:  Clinically stable right total hip arthroplasty 8 weeks post op. 10 minutes was the total time spent on today's visit  including reviewing test results, obtaining or reviewing history, physical exam, time spent on documentation or ordering prescriptions, tests and procedures after the visit. Plan:  Medications- OTC NSAIDS discussed. He  was advised that NSAID-type medications have two very important potential side effects: gastrointestinal irritation including hemorrhage and renal injuries. He was asked to take the medication with food and to stop if he experiences any GI upset. I asked him to call for vomiting, abdominal pain or black/bloody stools. He should have renal function testing per his medical provider periodically. The patient expresses understanding of these issues and questions were answered.         PT- Home exercise program was reviewed today including ROM exercises and strengthening exercises. The patient verbalized understanding of these exercises as well as the importance of the exercise program to promote return of normal function. If pain intensifies or other problems arise you are to notify the office. Further Imaging-next office visit with x-rays of his right hip and right knee arthroplasty      Follow up- November 2021   Call or return to clinic if these symptoms worsen or fail to improve as anticipated.

## 2021-03-11 LAB
ESTIMATED AVERAGE GLUCOSE: 122.6 MG/DL
HBA1C MFR BLD: 5.9 %
HCT VFR BLD CALC: 47.5 % (ref 40.5–52.5)
HEMOGLOBIN: 15.8 G/DL (ref 13.5–17.5)
MCH RBC QN AUTO: 28.9 PG (ref 26–34)
MCHC RBC AUTO-ENTMCNC: 33.2 G/DL (ref 31–36)
MCV RBC AUTO: 87.2 FL (ref 80–100)
PDW BLD-RTO: 14.6 % (ref 12.4–15.4)
PLATELET # BLD: 237 K/UL (ref 135–450)
PMV BLD AUTO: 8.8 FL (ref 5–10.5)
PROSTATE SPECIFIC ANTIGEN: 0.28 NG/ML (ref 0–4)
RBC # BLD: 5.45 M/UL (ref 4.2–5.9)
WBC # BLD: 4.2 K/UL (ref 4–11)

## 2021-03-12 LAB — TESTOSTERONE TOTAL: 687 NG/DL (ref 220–1000)

## 2021-04-16 ENCOUNTER — TELEPHONE (OUTPATIENT)
Dept: FAMILY MEDICINE CLINIC | Age: 56
End: 2021-04-16

## 2021-04-18 RX ORDER — SILDENAFIL CITRATE 20 MG/1
20 TABLET ORAL DAILY PRN
Qty: 90 TABLET | Refills: 3 | Status: SHIPPED | OUTPATIENT
Start: 2021-04-18

## 2021-05-27 ENCOUNTER — OFFICE VISIT (OUTPATIENT)
Dept: ORTHOPEDIC SURGERY | Age: 56
End: 2021-05-27
Payer: COMMERCIAL

## 2021-05-27 VITALS
RESPIRATION RATE: 12 BRPM | SYSTOLIC BLOOD PRESSURE: 149 MMHG | DIASTOLIC BLOOD PRESSURE: 93 MMHG | HEIGHT: 68 IN | HEART RATE: 82 BPM | BODY MASS INDEX: 26.83 KG/M2 | WEIGHT: 177 LBS

## 2021-05-27 DIAGNOSIS — M25.462 EFFUSION OF LEFT KNEE: Primary | ICD-10-CM

## 2021-05-27 PROCEDURE — 20610 DRAIN/INJ JOINT/BURSA W/O US: CPT | Performed by: ORTHOPAEDIC SURGERY

## 2021-05-27 PROCEDURE — 1036F TOBACCO NON-USER: CPT | Performed by: ORTHOPAEDIC SURGERY

## 2021-05-27 PROCEDURE — G8419 CALC BMI OUT NRM PARAM NOF/U: HCPCS | Performed by: ORTHOPAEDIC SURGERY

## 2021-05-27 PROCEDURE — 3017F COLORECTAL CA SCREEN DOC REV: CPT | Performed by: ORTHOPAEDIC SURGERY

## 2021-05-27 PROCEDURE — G8427 DOCREV CUR MEDS BY ELIG CLIN: HCPCS | Performed by: ORTHOPAEDIC SURGERY

## 2021-05-27 PROCEDURE — 99213 OFFICE O/P EST LOW 20 MIN: CPT | Performed by: ORTHOPAEDIC SURGERY

## 2021-05-27 RX ORDER — LIDOCAINE HYDROCHLORIDE 10 MG/ML
3 INJECTION, SOLUTION INFILTRATION; PERINEURAL ONCE
Status: COMPLETED | OUTPATIENT
Start: 2021-05-27 | End: 2021-05-27

## 2021-05-27 RX ORDER — METHYLPREDNISOLONE ACETATE 40 MG/ML
80 INJECTION, SUSPENSION INTRA-ARTICULAR; INTRALESIONAL; INTRAMUSCULAR; SOFT TISSUE ONCE
Status: COMPLETED | OUTPATIENT
Start: 2021-05-27 | End: 2021-05-27

## 2021-05-27 RX ADMIN — LIDOCAINE HYDROCHLORIDE 3 ML: 10 INJECTION, SOLUTION INFILTRATION; PERINEURAL at 16:45

## 2021-05-27 RX ADMIN — METHYLPREDNISOLONE ACETATE 80 MG: 40 INJECTION, SUSPENSION INTRA-ARTICULAR; INTRALESIONAL; INTRAMUSCULAR; SOFT TISSUE at 16:45

## 2021-05-27 NOTE — PROGRESS NOTES
Leobardo Fisher returns today for his left knee. He had aspiration and injection back in December and that was very helpful. He has had his right knee replaced and his right hip replaced. He says that he is not psychologically ready to consider left knee arthroplasty. Pain can be about 4 out of 10 with use. He especially feels unsteady with stairs. Patient's medications, allergies, past medical, surgical, social and family histories were reviewed and updated as appropriate. Relevant review of systems reviewed. General Exam:    Vitals: Blood pressure (!) 149/93, pulse 82, resp. rate 12, height 5' 8\" (1.727 m), weight 177 lb (80.3 kg). Constitutional: Patient is adequately groomed with no evidence of malnutrition  Mental Status: The patient is oriented to time, place and person. The patient's mood and affect are appropriate. Left knee has a moderate effusion. He has patellofemoral crepitation and medial and lateral joint line tenderness with range of motion 0 to 110 degrees. Left knee x-rays were again reviewed which demonstrate: Near medial patellofemoral arthritis with large loose bodies. Assessment: Symptomatic arthritic change left knee with effusion. Patient not ready for arthroplasty. Plan: Aspiration and injection. Procedure: Under sterile technique, left knee was anesthetized in the suprapatellar pouch. Then aspirated left knee of about 20 cc of serous fluid. Left knee was then injected with 80 mg Depo-Medrol. He tolerated that well. He understands that he is not a candidate for arthroplasty for at least 3 months because of the injection. This note was created using voice recognition software. It has been proofread, but occasionally errors remain. Please disregard these errors. They will be corrected as they are noted.

## 2021-06-02 ENCOUNTER — CLINICAL DOCUMENTATION (OUTPATIENT)
Dept: OTHER | Age: 56
End: 2021-06-02

## 2021-12-20 NOTE — PROGRESS NOTES
2021    Inocencia Dumas (:  1965) is a 49 y.o. male, here for evaluation of the following chief complaint(s): Annual Exam      ASSESSMENT/PLAN:     Diagnosis Orders   1. Routine general medical examination at a health care facility     2. Screening, lipid  Lipid Panel   3. Screen for colon cancer  [facility] - Saida Charles MD, Gastroenterology, [facility]   4. Screening PSA (prostate specific antigen)      reviewed MAR 2021 result normal   5. Essential hypertension  Renal Function Panel    at goal check renal       Return in about 1 year (around 2022) for Well Adult. An electronic signature was used to authenticate this note. @SIG@    SUBJECTIVE/OBJECTIVE:  (NOTE : prior results listed below reviewed at this visit to assist in medical decision making.)    HPI / ROS    # Preventive and other issues  # Lipids - recent screening history:  Lab Results   Component Value Date    LDLCALC 121 (H) 2019     Lab Results   Component Value Date    TRIG 106 2019     Lab Results   Component Value Date    HDL 66 (H) 2019     Lab Results   Component Value Date    CHOL 208 (H) 2019     # PSA screening history: reviewed labs this year [facility]  Lab Results   Component Value Date    PSA 0.28 03/10/2021    PSA 0.20 2019    PSA 0.25 2014     . # screen colon cancer - screening status discussed with patient; reviewed today prior testing Cologuard 2019 not completed; referred Colonoscopy          Wt Readings from Last 3 Encounters:   21 168 lb (76.2 kg)   21 177 lb (80.3 kg)   20 177 lb (80.3 kg)       BP Readings from Last 3 Encounters:   21 118/68   21 (!) 149/93   20 122/60       PHYSICAL EXAM  Vitals:    21 0825   BP: 118/68   Site: Right Upper Arm   Position: Sitting   Cuff Size: Large Adult   Pulse: 73   Resp: 16   SpO2: 97%   Weight: 168 lb (76.2 kg)   Height: 5' 8\" (1.727 m)     A&o  Neck no TMG no bruit  Car reg no MGR  Lungs cta  Ext no edema  Skin no jaundice  Eyes anicteric

## 2021-12-21 ENCOUNTER — OFFICE VISIT (OUTPATIENT)
Dept: FAMILY MEDICINE CLINIC | Age: 56
End: 2021-12-21
Payer: COMMERCIAL

## 2021-12-21 VITALS
RESPIRATION RATE: 16 BRPM | SYSTOLIC BLOOD PRESSURE: 118 MMHG | HEIGHT: 68 IN | BODY MASS INDEX: 25.46 KG/M2 | HEART RATE: 73 BPM | WEIGHT: 168 LBS | DIASTOLIC BLOOD PRESSURE: 68 MMHG | OXYGEN SATURATION: 97 %

## 2021-12-21 DIAGNOSIS — I10 ESSENTIAL HYPERTENSION: ICD-10-CM

## 2021-12-21 DIAGNOSIS — Z13.220 SCREENING, LIPID: ICD-10-CM

## 2021-12-21 DIAGNOSIS — Z00.00 ROUTINE GENERAL MEDICAL EXAMINATION AT A HEALTH CARE FACILITY: Primary | ICD-10-CM

## 2021-12-21 DIAGNOSIS — Z12.5 SCREENING PSA (PROSTATE SPECIFIC ANTIGEN): ICD-10-CM

## 2021-12-21 DIAGNOSIS — Z12.11 SCREEN FOR COLON CANCER: ICD-10-CM

## 2021-12-21 LAB
CHOLESTEROL, TOTAL: 224 MG/DL (ref 0–199)
HDLC SERPL-MCNC: 69 MG/DL (ref 40–60)
LDL CHOLESTEROL CALCULATED: 132 MG/DL
TRIGL SERPL-MCNC: 117 MG/DL (ref 0–150)
VLDLC SERPL CALC-MCNC: 23 MG/DL

## 2021-12-21 PROCEDURE — 99396 PREV VISIT EST AGE 40-64: CPT | Performed by: FAMILY MEDICINE

## 2021-12-21 PROCEDURE — G8484 FLU IMMUNIZE NO ADMIN: HCPCS | Performed by: FAMILY MEDICINE

## 2021-12-21 PROCEDURE — 36415 COLL VENOUS BLD VENIPUNCTURE: CPT | Performed by: FAMILY MEDICINE

## 2021-12-21 SDOH — ECONOMIC STABILITY: FOOD INSECURITY: WITHIN THE PAST 12 MONTHS, THE FOOD YOU BOUGHT JUST DIDN'T LAST AND YOU DIDN'T HAVE MONEY TO GET MORE.: NEVER TRUE

## 2021-12-21 SDOH — ECONOMIC STABILITY: FOOD INSECURITY: WITHIN THE PAST 12 MONTHS, YOU WORRIED THAT YOUR FOOD WOULD RUN OUT BEFORE YOU GOT MONEY TO BUY MORE.: NEVER TRUE

## 2021-12-21 ASSESSMENT — PATIENT HEALTH QUESTIONNAIRE - PHQ9
SUM OF ALL RESPONSES TO PHQ QUESTIONS 1-9: 0
1. LITTLE INTEREST OR PLEASURE IN DOING THINGS: 0
SUM OF ALL RESPONSES TO PHQ QUESTIONS 1-9: 0
SUM OF ALL RESPONSES TO PHQ QUESTIONS 1-9: 0
2. FEELING DOWN, DEPRESSED OR HOPELESS: 0
SUM OF ALL RESPONSES TO PHQ9 QUESTIONS 1 & 2: 0

## 2021-12-21 ASSESSMENT — SOCIAL DETERMINANTS OF HEALTH (SDOH): HOW HARD IS IT FOR YOU TO PAY FOR THE VERY BASICS LIKE FOOD, HOUSING, MEDICAL CARE, AND HEATING?: NOT HARD AT ALL

## 2021-12-22 ENCOUNTER — TELEPHONE (OUTPATIENT)
Dept: FAMILY MEDICINE CLINIC | Age: 56
End: 2021-12-22

## 2021-12-22 LAB
ALBUMIN SERPL-MCNC: 4.4 G/DL (ref 3.4–5)
ANION GAP SERPL CALCULATED.3IONS-SCNC: 17 MMOL/L (ref 3–16)
BUN BLDV-MCNC: 12 MG/DL (ref 7–20)
CALCIUM SERPL-MCNC: 9.7 MG/DL (ref 8.3–10.6)
CHLORIDE BLD-SCNC: 101 MMOL/L (ref 99–110)
CO2: 21 MMOL/L (ref 21–32)
CREAT SERPL-MCNC: 0.9 MG/DL (ref 0.9–1.3)
GFR AFRICAN AMERICAN: >60
GFR NON-AFRICAN AMERICAN: >60
GLUCOSE BLD-MCNC: 123 MG/DL (ref 70–99)
PHOSPHORUS: 3.3 MG/DL (ref 2.5–4.9)
POTASSIUM SERPL-SCNC: 4.3 MMOL/L (ref 3.5–5.1)
SODIUM BLD-SCNC: 139 MMOL/L (ref 136–145)

## 2021-12-22 NOTE — TELEPHONE ENCOUNTER
Left vm    Delbert Stephen, APRN - CNP   12/22/2021 11:20 AM EST         Please notify kidney function is normal. Bad cholesterol is elevated but his overall risk score is below when medication is recommended. Please focus on healthy diet and exercise.      Please document call and then close encounter. Tl Mancini           The 10-year ASCVD risk score (Aly Miguel, et al., 2013) is: 4.4%    Values used to calculate the score:      Age: 56 years      Sex: Male      Is Non- : No      Diabetic: No      Tobacco smoker: No      Systolic Blood Pressure: 596 mmHg      Is BP treated: Yes      HDL Cholesterol: 69 mg/dL      Total Cholesterol: 224 mg/dL     Pt notified of blood work.

## 2022-01-04 ENCOUNTER — TELEPHONE (OUTPATIENT)
Dept: FAMILY MEDICINE CLINIC | Age: 57
End: 2022-01-04

## 2022-01-04 NOTE — TELEPHONE ENCOUNTER
Called and spoke with patient, his daughter Vidhya Patino has tested positive and was looking for the guidelines to quarantine. I explained the cdc recommendations 5 days from exposure and 5 days with a mask as long as patient is asymptomatic.

## 2022-01-04 NOTE — TELEPHONE ENCOUNTER
Daughter has stuffy nose. She is not a pt here yet, but states she will be seeing Dr Rosana Griffiths. Her  is 2002. Just did a home covid test and she is positive. Wanting to know what she should do? Asked if anyone else in household had symptoms, he said no. Please advise. Pt was tested b/c some of her friends had tested positive as well.  Please call dad back at [phone]

## 2022-01-24 ENCOUNTER — TELEPHONE (OUTPATIENT)
Dept: ORTHOPEDIC SURGERY | Age: 57
End: 2022-01-24

## 2022-01-24 ENCOUNTER — OFFICE VISIT (OUTPATIENT)
Dept: ORTHOPEDIC SURGERY | Age: 57
End: 2022-01-24
Payer: COMMERCIAL

## 2022-01-24 VITALS — WEIGHT: 177 LBS | BODY MASS INDEX: 26.83 KG/M2 | HEIGHT: 68 IN | RESPIRATION RATE: 12 BRPM

## 2022-01-24 DIAGNOSIS — M17.12 ARTHRITIS OF LEFT KNEE: ICD-10-CM

## 2022-01-24 DIAGNOSIS — M25.462 EFFUSION OF LEFT KNEE: Primary | ICD-10-CM

## 2022-01-24 PROCEDURE — 20610 DRAIN/INJ JOINT/BURSA W/O US: CPT | Performed by: ORTHOPAEDIC SURGERY

## 2022-01-24 RX ORDER — METHYLPREDNISOLONE ACETATE 40 MG/ML
80 INJECTION, SUSPENSION INTRA-ARTICULAR; INTRALESIONAL; INTRAMUSCULAR; SOFT TISSUE ONCE
Status: COMPLETED | OUTPATIENT
Start: 2022-01-24 | End: 2022-01-24

## 2022-01-24 RX ORDER — LIDOCAINE HYDROCHLORIDE 10 MG/ML
3 INJECTION, SOLUTION INFILTRATION; PERINEURAL ONCE
Status: COMPLETED | OUTPATIENT
Start: 2022-01-24 | End: 2022-01-24

## 2022-01-24 RX ADMIN — METHYLPREDNISOLONE ACETATE 80 MG: 40 INJECTION, SUSPENSION INTRA-ARTICULAR; INTRALESIONAL; INTRAMUSCULAR; SOFT TISSUE at 16:32

## 2022-01-24 RX ADMIN — LIDOCAINE HYDROCHLORIDE 3 ML: 10 INJECTION, SOLUTION INFILTRATION; PERINEURAL at 16:32

## 2022-01-28 ENCOUNTER — TELEPHONE (OUTPATIENT)
Dept: FAMILY MEDICINE CLINIC | Age: 57
End: 2022-01-28

## 2022-01-28 NOTE — TELEPHONE ENCOUNTER
Pt states that it was discussed about changing his bp meds at his last visit. States that he is still getting the rings around his ankles with his socks from the aprodine. States the would like to go ahead and change the medication.      [address] [place] [address] [id number] Phone:  [phone]   Fax:  [phone]

## 2022-01-30 RX ORDER — LISINOPRIL 10 MG/1
10 TABLET ORAL DAILY
Qty: 90 TABLET | Refills: 1 | Status: SHIPPED | OUTPATIENT
Start: 2022-01-30 | End: 2022-08-26

## 2022-08-26 RX ORDER — LISINOPRIL 10 MG/1
TABLET ORAL
Qty: 90 TABLET | Refills: 1 | Status: SHIPPED | OUTPATIENT
Start: 2022-08-26

## 2022-08-26 NOTE — TELEPHONE ENCOUNTER
Medication:   Requested Prescriptions     Pending Prescriptions Disp Refills    lisinopril (PRINIVIL;ZESTRIL) 10 MG tablet [Pharmacy Med Name: LISINOPRIL 10 MG TABLET] 90 tablet 1     Sig: TAKE ONE TABLET BY MOUTH DAILY        Last Filled:  1/30/2022    Patient Phone Number: [phone] (home)     Last appt: 12/21/2021   Next appt: Visit date not found    Last OARRS:   RX Monitoring 10/5/2015   Attestation The Prescription Monitoring Report for this patient was reviewed today. Periodic Controlled Substance Monitoring No signs of potential drug abuse or diversion identified.

## 2023-01-13 RX ORDER — LISINOPRIL 10 MG/1
TABLET ORAL
Qty: 90 TABLET | Refills: 0 | Status: SHIPPED | OUTPATIENT
Start: 2023-01-13

## 2023-01-13 NOTE — TELEPHONE ENCOUNTER
Patient is calling requesting refills for:    Medication:   Requested Prescriptions     Pending Prescriptions Disp Refills    lisinopril (PRINIVIL;ZESTRIL) 10 MG tablet 90 tablet 1         Patient Phone Number: [phone] (home)     Last appt: 12/21/2021   Next appt: Visit date not found    Pharmacy:   [facility] [id number] [address] [place]. Leland Kumari [phone] - F [phone]  [address] [place] [address].   [place] [address]  Phone: [phone] Fax: [phone]

## 2023-01-18 ENCOUNTER — OFFICE VISIT (OUTPATIENT)
Dept: FAMILY MEDICINE CLINIC | Age: 58
End: 2023-01-18
Payer: COMMERCIAL

## 2023-01-18 VITALS
OXYGEN SATURATION: 98 % | BODY MASS INDEX: 28.22 KG/M2 | SYSTOLIC BLOOD PRESSURE: 130 MMHG | HEART RATE: 59 BPM | DIASTOLIC BLOOD PRESSURE: 76 MMHG | WEIGHT: 186.2 LBS | HEIGHT: 68 IN

## 2023-01-18 DIAGNOSIS — E78.5 HYPERLIPIDEMIA, UNSPECIFIED HYPERLIPIDEMIA TYPE: ICD-10-CM

## 2023-01-18 DIAGNOSIS — R73.03 PREDIABETES: ICD-10-CM

## 2023-01-18 DIAGNOSIS — Z12.11 SCREENING FOR COLON CANCER: ICD-10-CM

## 2023-01-18 DIAGNOSIS — I10 ESSENTIAL HYPERTENSION: Primary | ICD-10-CM

## 2023-01-18 DIAGNOSIS — Z12.5 SCREENING FOR PROSTATE CANCER: ICD-10-CM

## 2023-01-18 LAB
ANION GAP SERPL CALCULATED.3IONS-SCNC: 9 MMOL/L (ref 3–16)
BUN BLDV-MCNC: 17 MG/DL (ref 7–20)
CALCIUM SERPL-MCNC: 9.4 MG/DL (ref 8.3–10.6)
CHLORIDE BLD-SCNC: 104 MMOL/L (ref 99–110)
CO2: 27 MMOL/L (ref 21–32)
CREAT SERPL-MCNC: 0.9 MG/DL (ref 0.9–1.3)
GFR SERPL CREATININE-BSD FRML MDRD: >60 ML/MIN/{1.73_M2}
GLUCOSE BLD-MCNC: 96 MG/DL (ref 70–99)
POTASSIUM SERPL-SCNC: 5 MMOL/L (ref 3.5–5.1)
PROSTATE SPECIFIC ANTIGEN: 0.22 NG/ML (ref 0–4)
SODIUM BLD-SCNC: 140 MMOL/L (ref 136–145)

## 2023-01-18 PROCEDURE — 99396 PREV VISIT EST AGE 40-64: CPT | Performed by: NURSE PRACTITIONER

## 2023-01-18 PROCEDURE — G8484 FLU IMMUNIZE NO ADMIN: HCPCS | Performed by: NURSE PRACTITIONER

## 2023-01-18 PROCEDURE — 3074F SYST BP LT 130 MM HG: CPT | Performed by: NURSE PRACTITIONER

## 2023-01-18 PROCEDURE — 3078F DIAST BP <80 MM HG: CPT | Performed by: NURSE PRACTITIONER

## 2023-01-18 PROCEDURE — 36415 COLL VENOUS BLD VENIPUNCTURE: CPT | Performed by: NURSE PRACTITIONER

## 2023-01-18 SDOH — ECONOMIC STABILITY: FOOD INSECURITY: WITHIN THE PAST 12 MONTHS, THE FOOD YOU BOUGHT JUST DIDN'T LAST AND YOU DIDN'T HAVE MONEY TO GET MORE.: NEVER TRUE

## 2023-01-18 SDOH — ECONOMIC STABILITY: FOOD INSECURITY: WITHIN THE PAST 12 MONTHS, YOU WORRIED THAT YOUR FOOD WOULD RUN OUT BEFORE YOU GOT MONEY TO BUY MORE.: NEVER TRUE

## 2023-01-18 ASSESSMENT — PATIENT HEALTH QUESTIONNAIRE - PHQ9
SUM OF ALL RESPONSES TO PHQ QUESTIONS 1-9: 0
2. FEELING DOWN, DEPRESSED OR HOPELESS: 0
SUM OF ALL RESPONSES TO PHQ QUESTIONS 1-9: 0
SUM OF ALL RESPONSES TO PHQ QUESTIONS 1-9: 0
1. LITTLE INTEREST OR PLEASURE IN DOING THINGS: 0
SUM OF ALL RESPONSES TO PHQ QUESTIONS 1-9: 0
SUM OF ALL RESPONSES TO PHQ9 QUESTIONS 1 & 2: 0

## 2023-01-18 ASSESSMENT — SOCIAL DETERMINANTS OF HEALTH (SDOH): HOW HARD IS IT FOR YOU TO PAY FOR THE VERY BASICS LIKE FOOD, HOUSING, MEDICAL CARE, AND HEATING?: NOT HARD AT ALL

## 2023-01-18 NOTE — PROGRESS NOTES
History and Physical      Thuan Brown  YOB: 1965    Date of Service:  1/18/2023    Chief Complaint:   Thuan Brown is a 57 y.o. male who presents for complete physical examination.    HPI: No concerns.    HTN: Patient is on lisinopril.     Wt Readings from Last 3 Encounters:   01/18/23 186 lb 3.2 oz (84.5 kg)   01/24/22 177 lb (80.3 kg)   12/21/21 168 lb (76.2 kg)     BP Readings from Last 3 Encounters:   01/18/23 130/76   12/21/21 118/68   05/27/21 (!) 149/93       Patient Active Problem List   Diagnosis    ADD (attention deficit disorder)    Prepatellar bursitis of right knee    Essential hypertension    Adjustment disorder with depressed mood    Arthritis of right knee    Nodule of right lung    Mediastinal adenopathy       Preventive Care:  Health Maintenance   Topic Date Due    HIV screen  Never done    Shingles vaccine (1 of 2) Never done    COVID-19 Vaccine (2 - Booster for Luz Marina series) 10/18/2021    Flu vaccine (1) 08/01/2022    Colorectal Cancer Screen  01/15/2023    A1C test (Diabetic or Prediabetic)  01/18/2024    Depression Screen  01/18/2024    Lipids  01/20/2028    DTaP/Tdap/Td vaccine (2 - Td or Tdap) 01/28/2032    Hepatitis C screen  Completed    Hepatitis A vaccine  Aged Out    Hib vaccine  Aged Out    Meningococcal (ACWY) vaccine  Aged Out    Pneumococcal 0-64 years Vaccine  Aged Out      Self-testicular exams: No  Sexual activity: single partner, contraception - vasectomy   Last eye exam: last year, normal  Exercise: walks 7 time(s) per week  Seatbelt use: Yes  Lipid panel:    Lab Results   Component Value Date    CHOL 204 (H) 01/20/2023    TRIG 108 01/20/2023    HDL 58 01/20/2023    LDLCALC 124 (H) 01/20/2023     The 10-year ASCVD risk score (Fran JIMENEZ, et al., 2019) is: 6%    Values used to calculate the score:      Age: 57 years      Sex: Male      Is Non- : No      Diabetic: No      Tobacco smoker: No      Systolic Blood Pressure: 130 mmHg      Is  BP treated: Yes      HDL Cholesterol: 58 mg/dL      Total Cholesterol: 204 mg/dL      Living will: yes,   copy requested    Immunization History   Administered Date(s) Administered    COVID-19, J&J, (age 18y+), IM, 0.5 mL 08/23/2021    Influenza Vaccine, unspecified formulation 12/30/2009, 02/05/2017    Pneumococcal Conjugate Vaccine 02/05/2017       Allergies   Allergen Reactions    Seasonal Other (See Comments)     Grass/mold/pollen--pt states does not take any medication for them     Outpatient Medications Marked as Taking for the 1/18/23 encounter (Office Visit) with TIFFANIE Gordon - CNP   Medication Sig Dispense Refill    lisinopril (PRINIVIL;ZESTRIL) 10 MG tablet TAKE ONE TABLET BY MOUTH DAILY 90 tablet 0    sildenafil (REVATIO) 20 MG tablet Take 1 tablet by mouth daily as needed (sexual activity) 90 tablet 3    DUPIXENT 300 MG/2ML SOSY injection Inject 300 mg into the skin once a week          Past Medical History:   Diagnosis Date    Arthritis     Asthma     as a child--no problems in past 25 years    High blood pressure      Past Surgical History:   Procedure Laterality Date    BACK SURGERY  2010    CERVICAL [address] per Dr. Dr. Betancur [place]  02/06/2017    cysto, attempted ureteroscopy, left retrograde pyelogram    HERNIA REPAIR Right     INGUINAL HERNIA REPAIR Left 4.28.11    JOINT REPLACEMENT Right 12-    knee    KNEE ARTHROSCOPY      2004(pt unsure of which knee) 2012-both knees scoped    TOTAL HIP ARTHROPLASTY Right 11/4/2020    RIGHT ANTERIOR TOTAL HIP REPLACEMENT WITH C-ARM performed by Gordon Hart MD at [facility] Left 02/07/2017    DR. Ryder Walker.Copa     Family History   Problem Relation Age of Onset    Crohn's Disease Mother     Diabetes Father     Arthritis Other     Cancer Other     High Blood Pressure Other      Social History     Socioeconomic History    Marital status:      Spouse name: Not on file    Number of children: 4 Years of education: Not on file    Highest education level: Not on file   Occupational History    Occupation:     Tobacco Use    Smoking status: Never    Smokeless tobacco: Never   Vaping Use    Vaping Use: Never used   Substance and Sexual Activity    Alcohol use: Yes     Alcohol/week: 0.0 standard drinks     Comment: socially    Drug use: No    Sexual activity: Yes     Partners: Female   Other Topics Concern    Not on file   Social History Narrative    Not on file     Social Determinants of Health     Financial Resource Strain: Low Risk     Difficulty of Paying Living Expenses: Not hard at all   Food Insecurity: No Food Insecurity    Worried About Running Out of Food in the Last Year: Never true    Ran Out of Food in the Last Year: Never true   Transportation Needs: Not on file   Physical Activity: Not on file   Stress: Not on file   Social Connections: Not on file   Intimate Partner Violence: Not on file   Housing Stability: Not on file       Review of Systems:  A comprehensive review of systems was negative except for what was noted in the HPI. Physical Exam:   Vitals:    01/18/23 1523   BP: 130/76   Site: Left Upper Arm   Position: Sitting   Cuff Size: Medium Adult   Pulse: 59   SpO2: 98%   Weight: 186 lb 3.2 oz (84.5 kg)   Height: 5' 8\" (1.727 m)     Body mass index is 28.31 kg/m². Constitutional: He is oriented to person, place, and time. He appears well-developed and well-nourished. No distress. HEENT:   Head: Normocephalic and atraumatic. Right Ear: Tympanic membrane, external ear and ear canal normal.   Left Ear: Tympanic membrane, external ear and ear canal normal.   Nose: Nose normal.   Mouth/Throat: Oropharynx is clear and moist and mucous membranes are normal. No oropharyngeal exudate or posterior oropharyngeal erythema. He has no cervical adenopathy. Eyes: Conjunctivae and extraocular motions are normal. Pupils are equal, round, and reactive to light.    Neck: Full passive range of motion without pain. Neck supple. No JVD present. Carotid bruit is not present. No mass and no thyromegaly present. Cardiovascular: Normal rate, regular rhythm, normal heart sounds and intact distal pulses. Exam reveals no gallop and no friction rub. No murmur heard. Pulmonary/Chest: Effort normal and breath sounds normal. No respiratory distress. He has no wheezes, rhonchi or rales. Abdominal: Soft, non-tender. Bowel sounds and aorta are normal. There is no organomegaly, mass or bruit. Genitourinary:  rectal not indicated by age criteria and lack of symptoms. Musculoskeletal: Normal range of motion, no synovitis. He exhibits no edema. Neurological: He is alert and oriented to person, place, and time. He has normal reflexes. No cranial nerve deficit. Coordination normal.   Skin: Skin is warm, dry and intact. No suspicious lesions are noted. Psychiatric: He has a normal mood and affect. His speech is normal and behavior is normal. Judgment, cognition and memory are normal.     Assessment/Plan:    Rony Dang was seen today for medication check. Diagnoses and all orders for this visit:    Essential hypertension  -     Basic Metabolic Panel    Prediabetes  -     Hemoglobin A1C    Hyperlipidemia, unspecified hyperlipidemia type  -     Lipid Panel;  Future    Screening for colon cancer  -     Fecal DNA Colorectal cancer screening (Cologuard)    Screening for prostate cancer  -     PSA, Prostatic Specific Antigen

## 2023-01-19 LAB
ESTIMATED AVERAGE GLUCOSE: 122.6 MG/DL
HBA1C MFR BLD: 5.9 %

## 2023-01-20 ENCOUNTER — NURSE ONLY (OUTPATIENT)
Dept: FAMILY MEDICINE CLINIC | Age: 58
End: 2023-01-20
Payer: COMMERCIAL

## 2023-01-20 DIAGNOSIS — E78.5 HYPERLIPIDEMIA, UNSPECIFIED HYPERLIPIDEMIA TYPE: ICD-10-CM

## 2023-01-20 LAB
CHOLESTEROL, TOTAL: 204 MG/DL (ref 0–199)
HDLC SERPL-MCNC: 58 MG/DL (ref 40–60)
LDL CHOLESTEROL CALCULATED: 124 MG/DL
TRIGL SERPL-MCNC: 108 MG/DL (ref 0–150)
VLDLC SERPL CALC-MCNC: 22 MG/DL

## 2023-01-20 PROCEDURE — 36415 COLL VENOUS BLD VENIPUNCTURE: CPT | Performed by: NURSE PRACTITIONER

## 2023-01-30 ENCOUNTER — HOSPITAL ENCOUNTER (EMERGENCY)
Age: 58
Discharge: HOME | End: 2023-01-30
Payer: COMMERCIAL

## 2023-01-30 ENCOUNTER — APPOINTMENT (OUTPATIENT)
Dept: CT IMAGING | Age: 58
End: 2023-01-30
Payer: COMMERCIAL

## 2023-01-30 VITALS
OXYGEN SATURATION: 99 % | RESPIRATION RATE: 20 BRPM | SYSTOLIC BLOOD PRESSURE: 174 MMHG | WEIGHT: 188.05 LBS | BODY MASS INDEX: 28.5 KG/M2 | TEMPERATURE: 97.2 F | DIASTOLIC BLOOD PRESSURE: 87 MMHG | HEART RATE: 61 BPM | HEIGHT: 68 IN

## 2023-01-30 DIAGNOSIS — N20.0 KIDNEY STONE: Primary | ICD-10-CM

## 2023-01-30 LAB
A/G RATIO: 1.4 (ref 1.1–2.2)
ALBUMIN SERPL-MCNC: 3.9 G/DL (ref 3.4–5)
ALP BLD-CCNC: 61 U/L (ref 40–129)
ALT SERPL-CCNC: 19 U/L (ref 10–40)
ANION GAP SERPL CALCULATED.3IONS-SCNC: 10 MMOL/L (ref 3–16)
AST SERPL-CCNC: 25 U/L (ref 15–37)
BASOPHILS ABSOLUTE: 0 K/UL (ref 0–0.2)
BASOPHILS RELATIVE PERCENT: 0.2 %
BILIRUB SERPL-MCNC: 0.7 MG/DL (ref 0–1)
BILIRUBIN URINE: NEGATIVE
BLOOD, URINE: NEGATIVE
BUN BLDV-MCNC: 9 MG/DL (ref 7–20)
CALCIUM SERPL-MCNC: 9.2 MG/DL (ref 8.3–10.6)
CHLORIDE BLD-SCNC: 99 MMOL/L (ref 99–110)
CLARITY: CLEAR
CO2: 25 MMOL/L (ref 21–32)
COLOR: YELLOW
CREAT SERPL-MCNC: 0.9 MG/DL (ref 0.9–1.3)
EOSINOPHILS ABSOLUTE: 0.1 K/UL (ref 0–0.6)
EOSINOPHILS RELATIVE PERCENT: 1.3 %
GFR SERPL CREATININE-BSD FRML MDRD: >60 ML/MIN/{1.73_M2}
GLUCOSE BLD-MCNC: 117 MG/DL (ref 70–99)
GLUCOSE URINE: NEGATIVE MG/DL
HCT VFR BLD CALC: 41.2 % (ref 40.5–52.5)
HEMOGLOBIN: 13.5 G/DL (ref 13.5–17.5)
KETONES, URINE: NEGATIVE MG/DL
LEUKOCYTE ESTERASE, URINE: NEGATIVE
LIPASE: 32 U/L (ref 13–60)
LYMPHOCYTES ABSOLUTE: 0.7 K/UL (ref 1–5.1)
LYMPHOCYTES RELATIVE PERCENT: 8.1 %
MCH RBC QN AUTO: 29 PG (ref 26–34)
MCHC RBC AUTO-ENTMCNC: 32.7 G/DL (ref 31–36)
MCV RBC AUTO: 88.6 FL (ref 80–100)
MICROSCOPIC EXAMINATION: NORMAL
MONOCYTES ABSOLUTE: 0.5 K/UL (ref 0–1.3)
MONOCYTES RELATIVE PERCENT: 5.6 %
NEUTROPHILS ABSOLUTE: 7.1 K/UL (ref 1.7–7.7)
NEUTROPHILS RELATIVE PERCENT: 84.8 %
NITRITE, URINE: NEGATIVE
PDW BLD-RTO: 13.8 % (ref 12.4–15.4)
PH UA: 6.5 (ref 5–8)
PLATELET # BLD: 186 K/UL (ref 135–450)
PMV BLD AUTO: 8.4 FL (ref 5–10.5)
POTASSIUM REFLEX MAGNESIUM: 4.2 MMOL/L (ref 3.5–5.1)
PROTEIN UA: NEGATIVE MG/DL
RBC # BLD: 4.65 M/UL (ref 4.2–5.9)
SODIUM BLD-SCNC: 134 MMOL/L (ref 136–145)
SPECIFIC GRAVITY UA: 1 (ref 1–1.03)
TOTAL PROTEIN: 6.7 G/DL (ref 6.4–8.2)
URINE REFLEX TO CULTURE: NORMAL
URINE TYPE: NORMAL
UROBILINOGEN, URINE: 0.2 E.U./DL
WBC # BLD: 8.4 K/UL (ref 4–11)

## 2023-01-30 PROCEDURE — 96374 THER/PROPH/DIAG INJ IV PUSH: CPT

## 2023-01-30 PROCEDURE — 99284 EMERGENCY DEPT VISIT MOD MDM: CPT

## 2023-01-30 PROCEDURE — 74176 CT ABD & PELVIS W/O CONTRAST: CPT

## 2023-01-30 PROCEDURE — 85025 COMPLETE CBC W/AUTO DIFF WBC: CPT

## 2023-01-30 PROCEDURE — 96375 TX/PRO/DX INJ NEW DRUG ADDON: CPT

## 2023-01-30 PROCEDURE — 6360000002 HC RX W HCPCS: Performed by: PHYSICIAN ASSISTANT

## 2023-01-30 PROCEDURE — 6370000000 HC RX 637 (ALT 250 FOR IP): Performed by: PHYSICIAN ASSISTANT

## 2023-01-30 PROCEDURE — 80053 COMPREHEN METABOLIC PANEL: CPT

## 2023-01-30 PROCEDURE — 83690 ASSAY OF LIPASE: CPT

## 2023-01-30 PROCEDURE — 81003 URINALYSIS AUTO W/O SCOPE: CPT

## 2023-01-30 RX ORDER — KETOROLAC TROMETHAMINE 30 MG/ML
15 INJECTION, SOLUTION INTRAMUSCULAR; INTRAVENOUS ONCE
Status: COMPLETED | OUTPATIENT
Start: 2023-01-30 | End: 2023-01-30

## 2023-01-30 RX ORDER — MORPHINE SULFATE 4 MG/ML
4 INJECTION, SOLUTION INTRAMUSCULAR; INTRAVENOUS ONCE
Status: COMPLETED | OUTPATIENT
Start: 2023-01-30 | End: 2023-01-30

## 2023-01-30 RX ORDER — TAMSULOSIN HYDROCHLORIDE 0.4 MG/1
0.4 CAPSULE ORAL ONCE
Status: COMPLETED | OUTPATIENT
Start: 2023-01-30 | End: 2023-01-30

## 2023-01-30 RX ORDER — ONDANSETRON 2 MG/ML
4 INJECTION INTRAMUSCULAR; INTRAVENOUS ONCE
Status: COMPLETED | OUTPATIENT
Start: 2023-01-30 | End: 2023-01-30

## 2023-01-30 RX ORDER — 0.9 % SODIUM CHLORIDE 0.9 %
1000 INTRAVENOUS SOLUTION INTRAVENOUS ONCE
Status: DISCONTINUED | OUTPATIENT
Start: 2023-01-30 | End: 2023-01-30 | Stop reason: HOSPADM

## 2023-01-30 RX ORDER — TAMSULOSIN HYDROCHLORIDE 0.4 MG/1
0.4 CAPSULE ORAL DAILY
Qty: 30 CAPSULE | Refills: 0 | Status: SHIPPED | OUTPATIENT
Start: 2023-01-30

## 2023-01-30 RX ORDER — KETOROLAC TROMETHAMINE 10 MG/1
10 TABLET, FILM COATED ORAL EVERY 6 HOURS PRN
Qty: 20 TABLET | Refills: 0 | Status: SHIPPED | OUTPATIENT
Start: 2023-01-30

## 2023-01-30 RX ORDER — ONDANSETRON 4 MG/1
4 TABLET, FILM COATED ORAL EVERY 8 HOURS PRN
Qty: 20 TABLET | Refills: 0 | Status: SHIPPED | OUTPATIENT
Start: 2023-01-30

## 2023-01-30 RX ORDER — OXYCODONE HYDROCHLORIDE AND ACETAMINOPHEN 5; 325 MG/1; MG/1
1 TABLET ORAL EVERY 8 HOURS PRN
Qty: 6 TABLET | Refills: 0 | Status: SHIPPED | OUTPATIENT
Start: 2023-01-30 | End: 2023-01-31 | Stop reason: SDUPTHER

## 2023-01-30 RX ADMIN — ONDANSETRON 4 MG: 2 INJECTION INTRAMUSCULAR; INTRAVENOUS at 14:41

## 2023-01-30 RX ADMIN — MORPHINE SULFATE 4 MG: 4 INJECTION, SOLUTION INTRAMUSCULAR; INTRAVENOUS at 14:39

## 2023-01-30 RX ADMIN — TAMSULOSIN HYDROCHLORIDE 0.4 MG: 0.4 CAPSULE ORAL at 14:42

## 2023-01-30 RX ADMIN — KETOROLAC TROMETHAMINE 15 MG: 30 INJECTION, SOLUTION INTRAMUSCULAR; INTRAVENOUS at 14:42

## 2023-01-30 ASSESSMENT — LIFESTYLE VARIABLES
HOW OFTEN DO YOU HAVE A DRINK CONTAINING ALCOHOL: MONTHLY OR LESS
HOW MANY STANDARD DRINKS CONTAINING ALCOHOL DO YOU HAVE ON A TYPICAL DAY: 1 OR 2

## 2023-01-30 ASSESSMENT — PAIN SCALES - GENERAL: PAINLEVEL_OUTOF10: 8

## 2023-01-30 ASSESSMENT — PAIN DESCRIPTION - ORIENTATION: ORIENTATION: RIGHT

## 2023-01-30 ASSESSMENT — PAIN - FUNCTIONAL ASSESSMENT: PAIN_FUNCTIONAL_ASSESSMENT: 0-10

## 2023-01-30 ASSESSMENT — PAIN DESCRIPTION - LOCATION: LOCATION: FLANK

## 2023-01-30 NOTE — Clinical Note
Abdelrahman Friedman was seen and treated in our emergency department on 1/30/2023. He may return to work on 01/30/2023. Patient was seen here today at [facility] emergency department     If you have any questions or concerns, please don't hesitate to call.       Nati Truong PA-C

## 2023-01-30 NOTE — DISCHARGE INSTRUCTIONS
Use urinary strainer to attempt to collect her stone so you can take it with you to your appointment with your urologist.   Return to the emergency department if your symptoms worsen or new concerning symptoms present

## 2023-01-30 NOTE — ED TRIAGE NOTES
States right flank pain since Saturday. States history of kidney stone and that this feels like a kidney stone. Sitting up with easy breathing.

## 2023-01-30 NOTE — ED PROVIDER NOTES
[address] [facility]        Pt Name: Oseas Torres  MRN: [id number]  [place] 1965  Date of evaluation: 1/30/2023  Provider: Jessa Pate PA-C  PCP: Jose Aranda MD  Note Started: 12:21 PM EST 1/30/23      DINO. I have evaluated this patient. My supervising physician was available for consultation. CHIEF COMPLAINT       Chief Complaint   Patient presents with    Flank Pain     States right flank pain since Saturday. Believes he has a kidney stone. History of kidney stones. HISTORY OF PRESENT ILLNESS: 1 or more Elements     History from : Patient    Limitations to history : None    Oseas Torres is a 56 y.o. male who presents to the emergency department with complaint of right-sided flank pain that started on Saturday. Hyperseal was intermittent and then he says it became more constant today which prompted him to come into the emergency department for further evaluation. He does have some associated nausea and vomiting with this along with the pain. The pain does radiate from the right flank a little bit into the abdomen, but no radiation down into the testicles. No testicular pain, dysuria, hematuria, chest pain, lightheadedness. Nursing Notes were all reviewed and agreed with or any disagreements were addressed in the HPI. REVIEW OF SYSTEMS :      Review of Systems   Constitutional:  Negative for chills and fever. HENT:  Negative for ear pain and sore throat. Eyes:  Negative for pain and visual disturbance. Respiratory:  Negative for cough and shortness of breath. Cardiovascular:  Negative for chest pain and leg swelling. Gastrointestinal:  Positive for abdominal pain, nausea and vomiting. Negative for constipation and diarrhea. Genitourinary:  Negative for dysuria and hematuria. Musculoskeletal:  Negative for back pain and neck pain. Skin:  Negative for rash and wound.    Neurological: Negative for light-headedness and headaches. Positives and Pertinent negatives as per HPI. SURGICAL HISTORY     Past Surgical History:   Procedure Laterality Date    BACK SURGERY  2010    CERVICAL DISC SURGERY per Dr. Dr. Joe Pantoja  02/06/2017    cysto, attempted ureteroscopy, left retrograde pyelogram    HERNIA REPAIR Right     INGUINAL HERNIA REPAIR Left 4.28.11    JOINT REPLACEMENT Right 12-    knee    KNEE ARTHROSCOPY      2004(pt unsure of which knee) 2012-both knees scoped    TOTAL HIP ARTHROPLASTY Right 11/4/2020    RIGHT ANTERIOR TOTAL HIP REPLACEMENT WITH C-ARM performed by Seth Grewal MD at [facility] Left 02/07/2017     [address]       Discharge Medication List as of 1/30/2023  3:08 PM        CONTINUE these medications which have NOT CHANGED    Details   lisinopril (PRINIVIL;ZESTRIL) 10 MG tablet TAKE ONE TABLET BY MOUTH DAILY, Disp-90 tablet, R-0Normal      sildenafil (REVATIO) 20 MG tablet Take 1 tablet by mouth daily as needed (sexual activity), Disp-90 tablet, R-3DO NOT BILL INSURANCE - Patient will use GoodRx couponNormal      DUPIXENT 300 MG/2ML SOSY injection Inject 300 mg into the skin once a week , DAWHistorical Med             ALLERGIES     Seasonal    FAMILYHISTORY       Family History   Problem Relation Age of Onset    Crohn's Disease Mother     Diabetes Father     Arthritis Other     Cancer Other     High Blood Pressure Other         SOCIAL HISTORY       Social History     Tobacco Use    Smoking status: Never    Smokeless tobacco: Never   Vaping Use    Vaping Use: Never used   Substance Use Topics    Alcohol use:  Yes     Alcohol/week: 0.0 standard drinks     Comment: socially    Drug use: No       SCREENINGS        Gaurav Coma Scale  Eye Opening: Spontaneous  Best Verbal Response: Oriented  Best Motor Response: Obeys commands  Buckley Coma Scale Score: 15                CIWA Assessment  BP: (!) 174/87  Heart Rate: 61           PHYSICAL EXAM  1 or more Elements     ED Triage Vitals [01/30/23 1212]   BP Temp Temp Source Heart Rate Resp SpO2 Height Weight   (!) 174/87 97.2 °F (36.2 °C) Oral 61 20 99 % 5' 8\" (1.727 m) 188 lb 0.8 oz (85.3 kg)       Physical Exam  Constitutional:       General: He is not in acute distress. Appearance: Normal appearance. He is not ill-appearing, toxic-appearing or diaphoretic. HENT:      Head: Normocephalic and atraumatic. Right Ear: External ear normal.      Left Ear: External ear normal.      Nose: Nose normal.   Eyes:      General:         Right eye: No discharge. Left eye: No discharge. Cardiovascular:      Rate and Rhythm: Normal rate and regular rhythm. Pulses: Normal pulses. Heart sounds: Normal heart sounds. No murmur heard. No gallop. Pulmonary:      Effort: Pulmonary effort is normal. No respiratory distress. Breath sounds: Normal breath sounds. No stridor. No wheezing, rhonchi or rales. Abdominal:      General: Bowel sounds are normal. There is no distension. Palpations: Abdomen is soft. Tenderness: There is no abdominal tenderness. There is no right CVA tenderness, left CVA tenderness or rebound. Comments: There is actually no pain with palpation to the abdomen or the flanks but drawn above is where he states the pain is    Musculoskeletal:         General: Normal range of motion. Cervical back: Normal range of motion. Skin:     General: Skin is warm and dry. Neurological:      General: No focal deficit present. Mental Status: He is alert and oriented to person, place, and time.    Psychiatric:         Mood and Affect: Mood normal.         Behavior: Behavior normal.         DIAGNOSTIC RESULTS   LABS:    Labs Reviewed   CBC WITH AUTO DIFFERENTIAL - Abnormal; Notable for the following components:       Result Value    Lymphocytes Absolute 0.7 (*)     All other components within normal limits COMPREHENSIVE METABOLIC PANEL W/ REFLEX TO MG FOR LOW K - Abnormal; Notable for the following components:    Sodium 134 (*)     Glucose 117 (*)     All other components within normal limits   LIPASE   URINALYSIS WITH REFLEX TO CULTURE       When ordered only abnormal lab results are displayed. All other labs were within normal range or not returned as of this dictation. EKG: When ordered, EKG's are interpreted by the Emergency Department Physician in the absence of a cardiologist.  Please see their note for interpretation of EKG. RADIOLOGY:   Non-plain film images such as CT, Ultrasound and MRI are read by the radiologist. Plain radiographic images are visualized and preliminarily interpreted by the ED Provider with the below findings:    Individually interpreted by myself    Interpretation per the Radiologist below, if available at the time of this note:    CT ABDOMEN PELVIS WO CONTRAST Additional Contrast? None   Preliminary Result   There is a 3 mm distal right ureteral calculus causing moderate right-sided   obstruction. Punctate nonobstructing right upper pole renal calculus. Normal appendix. Small hiatal hernia. Diverticulosis. No results found. No results found. PROCEDURES   Unless otherwise noted below, none     Procedures    CRITICAL CARE TIME (.cctime)   CRITICAL CARE NOTE:    Glenn Sarmiento am the primary clinician of record. There was a high probability of clinically significant life-threatening deterioration of the patient's condition requiring my urgent intervention. Total critical care time was at least 31 minutes. Of nonconcurrent critical care time. This includes vital sign monitoring, pulse oximetry monitoring, telemetry monitoring, clinical response to the IV medications, reviewing the nursing notes, consultation time, dictation/documentation time, and interpretation of the labwork.  This excludes any separately billable procedures performed. PAST MEDICAL HISTORY      has a past medical history of Arthritis, Asthma, High blood pressure, and Kidney stone. Chronic Conditions affecting Care:    EMERGENCY DEPARTMENT COURSE and DIFFERENTIAL DIAGNOSIS/MDM:   Vitals:    Vitals:    01/30/23 1212   BP: (!) 174/87   Pulse: 61   Resp: 20   Temp: 97.2 °F (36.2 °C)   TempSrc: Oral   SpO2: 99%   Weight: 188 lb 0.8 oz (85.3 kg)   Height: 5' 8\" (1.727 m)       Patient was given the following medications:  Medications   morphine (PF) injection 4 mg (4 mg IntraVENous Given 1/30/23 1439)   tamsulosin (FLOMAX) capsule 0.4 mg (0.4 mg Oral Given 1/30/23 1442)   ondansetron (ZOFRAN) injection 4 mg (4 mg IntraVENous Given 1/30/23 1441)   ketorolac (TORADOL) injection 15 mg (15 mg IntraVENous Given 1/30/23 1442)             Is this patient to be included in the SEP-1 Core Measure due to severe sepsis or septic shock? No   Exclusion criteria - the patient is NOT to be included for SEP-1 Core Measure due to:  2+ SIRS criteria are not met    CONSULTS: (Who and What was discussed)  None          Records Reviewed : Source [facility] CT 02/07/2017--nonobstructing 3mm right renal stone  02/03/17--had distal left ureteral calculus    CC/HPI Summary, DDx, ED Course, and Reassessment: This is a 56y.o. year old, ill-appearing male with  has a past medical history of Arthritis, Asthma, High blood pressure, and Kidney stone. who presents to the ED with complaint of right flank pain that began several days prior. Vitals upon arrival show htn, otherwise wnl. Physical Exam shows as above.     Blood work was done and shows:   -Mild hyponatremia 134    Urinalysis: Not indicative of infection    Imaging was reviewed and interpreted by radiologist as above showing:   -3 mm distal right ureteral stone with moderate right-sided hydroureteronephrosis    Ddx includes: Kidney stone, septic stone, cystitis, appendicitis, gastroenteritis, other    Management Given:  -Morphine, Toradol, Flomax, Zofran, symptoms improved    Disposition: Discharge  Patient was informed to return to the Emergency Department if any new worsening or more concerning symptoms occur, in agreement with plan. Shared decision making was practiced, and patient discharged in stable condition. Informed to follow up with urologist for further evaluation. Medications were prescribed as below. He was given a strainer to go home with. All questions were answered. Disposition Considerations (include 1 Tests not done, Shared Decision Making, Pt Expectation of Test or Tx.): Consider getting a CT AP with contrast rather than without. However with his history of kidney stones and symptom description we elected to do it without contrast.  Risk versus benefits were discussed. I am the Primary Clinician of Record. FINAL IMPRESSION      1. Kidney stone          DISPOSITION/PLAN     DISPOSITION Decision To Discharge 01/30/2023 03:00:12 PM      PATIENT REFERRED TO:  Martín Stewart, [address] [place] [address]  [place] [address]  [phone]    Schedule an appointment as soon as possible for a visit in 1 day  for reevaluation    [facility]  [address] [address]  [phone]  Go to   As needed, If symptoms worsen    DISCHARGE MEDICATIONS:  Discharge Medication List as of 1/30/2023  3:08 PM        START taking these medications    Details   ketorolac (TORADOL) 10 MG tablet Take 1 tablet by mouth every 6 hours as needed for Pain, Disp-20 tablet, R-0Normal      tamsulosin (FLOMAX) 0.4 MG capsule Take 1 capsule by mouth daily, Disp-30 capsule, R-0Normal      ondansetron (ZOFRAN) 4 MG tablet Take 1 tablet by mouth every 8 hours as needed for Nausea, Disp-20 tablet, R-0Normal      oxyCODONE-acetaminophen (PERCOCET) 5-325 MG per tablet Take 1 tablet by mouth every 8 hours as needed for Pain for up to 3 days. Intended supply: 3 days.  Take lowest dose possible to manage pain Max Daily Amount: 3 tablets, Disp-6 tablet, R-0Normal             DISCONTINUED MEDICATIONS:  Discharge Medication List as of 1/30/2023  3:08 PM                 (Please note that portions of this note were completed with a voice recognition program.  Efforts were made to edit the dictations but occasionally words are mis-transcribed.)    Socorro Blake PA-C (electronically signed)            Socorro Blake PA-C  01/31/23 [id number]

## 2023-01-31 ENCOUNTER — TELEPHONE (OUTPATIENT)
Dept: FAMILY MEDICINE CLINIC | Age: 58
End: 2023-01-31

## 2023-01-31 DIAGNOSIS — N20.0 KIDNEY STONE: ICD-10-CM

## 2023-01-31 RX ORDER — OXYCODONE HYDROCHLORIDE AND ACETAMINOPHEN 5; 325 MG/1; MG/1
1 TABLET ORAL EVERY 6 HOURS PRN
Qty: 12 TABLET | Refills: 0 | Status: SHIPPED | OUTPATIENT
Start: 2023-01-31 | End: 2023-02-03

## 2023-01-31 ASSESSMENT — ENCOUNTER SYMPTOMS
CONSTIPATION: 0
NAUSEA: 1
ABDOMINAL PAIN: 1
SHORTNESS OF BREATH: 0
COUGH: 0
DIARRHEA: 0
VOMITING: 1
BACK PAIN: 0
SORE THROAT: 0
EYE PAIN: 0

## 2023-01-31 NOTE — TELEPHONE ENCOUNTER
Patient's wife Geovanni Telles called in to report that the Patient has a kidney stone and was seen in the ED for it yesterday. The ED stated that the stone was small enough for the Patient to pass on his own and provided the Patient with pain medication. Patient's wife states the Patient's pain medication is only enough for today and the Patient is still in extreme pain and was up all night. Patient's wife would like to know what the Patient can do to help pass the stone or alleviate the pain. Patient can be reached at [phone].

## 2023-01-31 NOTE — TELEPHONE ENCOUNTER
RX sent for 3 days more of percocet and may take every 6 hours; take the flomax prescribed and hydrate well. It is a 3 mm stone and should pass fairly easily soon.  It was fairly far down yesterday so hopefully subsides soon

## 2023-02-01 ENCOUNTER — ANESTHESIA EVENT (OUTPATIENT)
Dept: OPERATING ROOM | Age: 58
End: 2023-02-01
Payer: COMMERCIAL

## 2023-02-01 ENCOUNTER — APPOINTMENT (OUTPATIENT)
Dept: CT IMAGING | Age: 58
End: 2023-02-01
Payer: COMMERCIAL

## 2023-02-01 ENCOUNTER — ANESTHESIA (OUTPATIENT)
Dept: OPERATING ROOM | Age: 58
End: 2023-02-01
Payer: COMMERCIAL

## 2023-02-01 ENCOUNTER — HOSPITAL ENCOUNTER (INPATIENT)
Age: 58
LOS: 2 days | Discharge: HOME | End: 2023-02-03
Attending: STUDENT IN AN ORGANIZED HEALTH CARE EDUCATION/TRAINING PROGRAM | Admitting: INTERNAL MEDICINE
Payer: COMMERCIAL

## 2023-02-01 DIAGNOSIS — R10.9 FLANK PAIN: ICD-10-CM

## 2023-02-01 DIAGNOSIS — N20.0 NEPHROLITHIASIS: Primary | ICD-10-CM

## 2023-02-01 LAB
A/G RATIO: 1.4 (ref 1.1–2.2)
ALBUMIN SERPL-MCNC: 3.8 G/DL (ref 3.4–5)
ALP BLD-CCNC: 62 U/L (ref 40–129)
ALT SERPL-CCNC: 20 U/L (ref 10–40)
ANION GAP SERPL CALCULATED.3IONS-SCNC: 10 MMOL/L (ref 3–16)
AST SERPL-CCNC: 28 U/L (ref 15–37)
BASOPHILS ABSOLUTE: 0 K/UL (ref 0–0.2)
BASOPHILS RELATIVE PERCENT: 0.1 %
BILIRUB SERPL-MCNC: 1 MG/DL (ref 0–1)
BILIRUBIN URINE: NEGATIVE
BLOOD, URINE: NEGATIVE
BUN BLDV-MCNC: 16 MG/DL (ref 7–20)
CALCIUM SERPL-MCNC: 9.3 MG/DL (ref 8.3–10.6)
CHLORIDE BLD-SCNC: 96 MMOL/L (ref 99–110)
CLARITY: CLEAR
CO2: 23 MMOL/L (ref 21–32)
COLOR: YELLOW
CREAT SERPL-MCNC: 1.6 MG/DL (ref 0.9–1.3)
EOSINOPHILS ABSOLUTE: 0 K/UL (ref 0–0.6)
EOSINOPHILS RELATIVE PERCENT: 0.3 %
GFR SERPL CREATININE-BSD FRML MDRD: 50 ML/MIN/{1.73_M2}
GLUCOSE BLD-MCNC: 140 MG/DL (ref 70–99)
GLUCOSE URINE: NEGATIVE MG/DL
HCT VFR BLD CALC: 42.4 % (ref 40.5–52.5)
HEMOGLOBIN: 13.9 G/DL (ref 13.5–17.5)
KETONES, URINE: NEGATIVE MG/DL
LACTIC ACID: 1.6 MMOL/L (ref 0.4–2)
LACTIC ACID: 2.1 MMOL/L (ref 0.4–2)
LEUKOCYTE ESTERASE, URINE: NEGATIVE
LIPASE: 24 U/L (ref 13–60)
LYMPHOCYTES ABSOLUTE: 0.4 K/UL (ref 1–5.1)
LYMPHOCYTES RELATIVE PERCENT: 3.9 %
MCH RBC QN AUTO: 28.9 PG (ref 26–34)
MCHC RBC AUTO-ENTMCNC: 32.7 G/DL (ref 31–36)
MCV RBC AUTO: 88.2 FL (ref 80–100)
MICROSCOPIC EXAMINATION: NORMAL
MONOCYTES ABSOLUTE: 0.5 K/UL (ref 0–1.3)
MONOCYTES RELATIVE PERCENT: 4.8 %
NEUTROPHILS ABSOLUTE: 8.8 K/UL (ref 1.7–7.7)
NEUTROPHILS RELATIVE PERCENT: 90.9 %
NITRITE, URINE: NEGATIVE
PDW BLD-RTO: 13.3 % (ref 12.4–15.4)
PH UA: 5.5 (ref 5–8)
PLATELET # BLD: 178 K/UL (ref 135–450)
PMV BLD AUTO: 8.3 FL (ref 5–10.5)
POTASSIUM REFLEX MAGNESIUM: 4.8 MMOL/L (ref 3.5–5.1)
PROTEIN UA: NEGATIVE MG/DL
RBC # BLD: 4.81 M/UL (ref 4.2–5.9)
SODIUM BLD-SCNC: 129 MMOL/L (ref 136–145)
SPECIFIC GRAVITY UA: 1 (ref 1–1.03)
TOTAL PROTEIN: 6.5 G/DL (ref 6.4–8.2)
URINE REFLEX TO CULTURE: NORMAL
URINE TYPE: NORMAL
UROBILINOGEN, URINE: 0.2 E.U./DL
WBC # BLD: 9.7 K/UL (ref 4–11)

## 2023-02-01 PROCEDURE — 3600000002 HC SURGERY LEVEL 2 BASE: Performed by: UROLOGY

## 2023-02-01 PROCEDURE — 80053 COMPREHEN METABOLIC PANEL: CPT

## 2023-02-01 PROCEDURE — 6370000000 HC RX 637 (ALT 250 FOR IP): Performed by: STUDENT IN AN ORGANIZED HEALTH CARE EDUCATION/TRAINING PROGRAM

## 2023-02-01 PROCEDURE — 6370000000 HC RX 637 (ALT 250 FOR IP): Performed by: INTERNAL MEDICINE

## 2023-02-01 PROCEDURE — 6360000002 HC RX W HCPCS: Performed by: NURSE ANESTHETIST, CERTIFIED REGISTERED

## 2023-02-01 PROCEDURE — 7100000000 HC PACU RECOVERY - FIRST 15 MIN: Performed by: UROLOGY

## 2023-02-01 PROCEDURE — 96361 HYDRATE IV INFUSION ADD-ON: CPT

## 2023-02-01 PROCEDURE — 87086 URINE CULTURE/COLONY COUNT: CPT

## 2023-02-01 PROCEDURE — 2580000003 HC RX 258: Performed by: NURSE ANESTHETIST, CERTIFIED REGISTERED

## 2023-02-01 PROCEDURE — 2709999900 HC NON-CHARGEABLE SUPPLY: Performed by: UROLOGY

## 2023-02-01 PROCEDURE — 2500000003 HC RX 250 WO HCPCS: Performed by: NURSE ANESTHETIST, CERTIFIED REGISTERED

## 2023-02-01 PROCEDURE — 2580000003 HC RX 258: Performed by: INTERNAL MEDICINE

## 2023-02-01 PROCEDURE — C2617 STENT, NON-COR, TEM W/O DEL: HCPCS | Performed by: UROLOGY

## 2023-02-01 PROCEDURE — 83605 ASSAY OF LACTIC ACID: CPT

## 2023-02-01 PROCEDURE — 99285 EMERGENCY DEPT VISIT HI MDM: CPT

## 2023-02-01 PROCEDURE — 1200000000 HC SEMI PRIVATE

## 2023-02-01 PROCEDURE — 96375 TX/PRO/DX INJ NEW DRUG ADDON: CPT

## 2023-02-01 PROCEDURE — 96374 THER/PROPH/DIAG INJ IV PUSH: CPT

## 2023-02-01 PROCEDURE — 0T768DZ DILATION OF RIGHT URETER WITH INTRALUMINAL DEVICE, VIA NATURAL OR ARTIFICIAL OPENING ENDOSCOPIC: ICD-10-PCS | Performed by: UROLOGY

## 2023-02-01 PROCEDURE — 81003 URINALYSIS AUTO W/O SCOPE: CPT

## 2023-02-01 PROCEDURE — 85025 COMPLETE CBC W/AUTO DIFF WBC: CPT

## 2023-02-01 PROCEDURE — 2580000003 HC RX 258: Performed by: STUDENT IN AN ORGANIZED HEALTH CARE EDUCATION/TRAINING PROGRAM

## 2023-02-01 PROCEDURE — 36415 COLL VENOUS BLD VENIPUNCTURE: CPT

## 2023-02-01 PROCEDURE — 6360000002 HC RX W HCPCS: Performed by: STUDENT IN AN ORGANIZED HEALTH CARE EDUCATION/TRAINING PROGRAM

## 2023-02-01 PROCEDURE — 87040 BLOOD CULTURE FOR BACTERIA: CPT

## 2023-02-01 PROCEDURE — 3600000012 HC SURGERY LEVEL 2 ADDTL 15MIN: Performed by: UROLOGY

## 2023-02-01 PROCEDURE — 83690 ASSAY OF LIPASE: CPT

## 2023-02-01 PROCEDURE — 2580000003 HC RX 258: Performed by: UROLOGY

## 2023-02-01 PROCEDURE — 7100000001 HC PACU RECOVERY - ADDTL 15 MIN: Performed by: UROLOGY

## 2023-02-01 PROCEDURE — 6360000002 HC RX W HCPCS: Performed by: INTERNAL MEDICINE

## 2023-02-01 PROCEDURE — 3700000000 HC ANESTHESIA ATTENDED CARE: Performed by: UROLOGY

## 2023-02-01 PROCEDURE — C1769 GUIDE WIRE: HCPCS | Performed by: UROLOGY

## 2023-02-01 PROCEDURE — 3700000001 HC ADD 15 MINUTES (ANESTHESIA): Performed by: UROLOGY

## 2023-02-01 DEVICE — STENT URET 6FR L26CM PERCFLX HYDR+ TAPR TIP GRAD: Type: IMPLANTABLE DEVICE | Site: URETER | Status: FUNCTIONAL

## 2023-02-01 RX ORDER — ONDANSETRON 2 MG/ML
INJECTION INTRAMUSCULAR; INTRAVENOUS PRN
Status: DISCONTINUED | OUTPATIENT
Start: 2023-02-01 | End: 2023-02-01 | Stop reason: SDUPTHER

## 2023-02-01 RX ORDER — LIDOCAINE HYDROCHLORIDE 20 MG/ML
INJECTION, SOLUTION EPIDURAL; INFILTRATION; INTRACAUDAL; PERINEURAL PRN
Status: DISCONTINUED | OUTPATIENT
Start: 2023-02-01 | End: 2023-02-01 | Stop reason: SDUPTHER

## 2023-02-01 RX ORDER — ACETAMINOPHEN 325 MG/1
650 TABLET ORAL EVERY 6 HOURS PRN
Status: DISCONTINUED | OUTPATIENT
Start: 2023-02-01 | End: 2023-02-03 | Stop reason: HOSPADM

## 2023-02-01 RX ORDER — SODIUM CHLORIDE 0.9 % (FLUSH) 0.9 %
10 SYRINGE (ML) INJECTION EVERY 12 HOURS SCHEDULED
Status: DISCONTINUED | OUTPATIENT
Start: 2023-02-01 | End: 2023-02-03 | Stop reason: HOSPADM

## 2023-02-01 RX ORDER — PROPOFOL 10 MG/ML
INJECTION, EMULSION INTRAVENOUS PRN
Status: DISCONTINUED | OUTPATIENT
Start: 2023-02-01 | End: 2023-02-01 | Stop reason: SDUPTHER

## 2023-02-01 RX ORDER — PHENYLEPHRINE HCL IN 0.9% NACL 1 MG/10 ML
SYRINGE (ML) INTRAVENOUS PRN
Status: DISCONTINUED | OUTPATIENT
Start: 2023-02-01 | End: 2023-02-01 | Stop reason: SDUPTHER

## 2023-02-01 RX ORDER — SUCCINYLCHOLINE/SOD CL,ISO/PF 200MG/10ML
SYRINGE (ML) INTRAVENOUS PRN
Status: DISCONTINUED | OUTPATIENT
Start: 2023-02-01 | End: 2023-02-01 | Stop reason: SDUPTHER

## 2023-02-01 RX ORDER — POTASSIUM CHLORIDE 20 MEQ/1
40 TABLET, EXTENDED RELEASE ORAL PRN
Status: DISCONTINUED | OUTPATIENT
Start: 2023-02-01 | End: 2023-02-03 | Stop reason: HOSPADM

## 2023-02-01 RX ORDER — SODIUM CHLORIDE 0.9 % (FLUSH) 0.9 %
10 SYRINGE (ML) INJECTION PRN
Status: DISCONTINUED | OUTPATIENT
Start: 2023-02-01 | End: 2023-02-03 | Stop reason: HOSPADM

## 2023-02-01 RX ORDER — SODIUM CHLORIDE 0.9 % (FLUSH) 0.9 %
5-40 SYRINGE (ML) INJECTION EVERY 12 HOURS SCHEDULED
Status: DISCONTINUED | OUTPATIENT
Start: 2023-02-01 | End: 2023-02-01 | Stop reason: HOSPADM

## 2023-02-01 RX ORDER — SODIUM CHLORIDE 9 MG/ML
INJECTION, SOLUTION INTRAVENOUS CONTINUOUS
Status: DISCONTINUED | OUTPATIENT
Start: 2023-02-01 | End: 2023-02-03 | Stop reason: HOSPADM

## 2023-02-01 RX ORDER — EPHEDRINE SULFATE/0.9% NACL/PF 50 MG/5 ML
SYRINGE (ML) INTRAVENOUS PRN
Status: DISCONTINUED | OUTPATIENT
Start: 2023-02-01 | End: 2023-02-01 | Stop reason: SDUPTHER

## 2023-02-01 RX ORDER — POTASSIUM CHLORIDE 7.45 MG/ML
10 INJECTION INTRAVENOUS PRN
Status: DISCONTINUED | OUTPATIENT
Start: 2023-02-01 | End: 2023-02-03 | Stop reason: HOSPADM

## 2023-02-01 RX ORDER — MAGNESIUM HYDROXIDE 1200 MG/15ML
LIQUID ORAL
Status: COMPLETED | OUTPATIENT
Start: 2023-02-01 | End: 2023-02-01

## 2023-02-01 RX ORDER — 0.9 % SODIUM CHLORIDE 0.9 %
1000 INTRAVENOUS SOLUTION INTRAVENOUS ONCE
Status: COMPLETED | OUTPATIENT
Start: 2023-02-01 | End: 2023-02-01

## 2023-02-01 RX ORDER — MAGNESIUM SULFATE IN WATER 40 MG/ML
2000 INJECTION, SOLUTION INTRAVENOUS PRN
Status: DISCONTINUED | OUTPATIENT
Start: 2023-02-01 | End: 2023-02-03 | Stop reason: HOSPADM

## 2023-02-01 RX ORDER — SODIUM CHLORIDE 0.9 % (FLUSH) 0.9 %
5-40 SYRINGE (ML) INJECTION PRN
Status: DISCONTINUED | OUTPATIENT
Start: 2023-02-01 | End: 2023-02-01 | Stop reason: HOSPADM

## 2023-02-01 RX ORDER — DEXAMETHASONE SODIUM PHOSPHATE 4 MG/ML
INJECTION, SOLUTION INTRA-ARTICULAR; INTRALESIONAL; INTRAMUSCULAR; INTRAVENOUS; SOFT TISSUE PRN
Status: DISCONTINUED | OUTPATIENT
Start: 2023-02-01 | End: 2023-02-01 | Stop reason: SDUPTHER

## 2023-02-01 RX ORDER — FENTANYL CITRATE 50 UG/ML
INJECTION, SOLUTION INTRAMUSCULAR; INTRAVENOUS PRN
Status: DISCONTINUED | OUTPATIENT
Start: 2023-02-01 | End: 2023-02-01 | Stop reason: SDUPTHER

## 2023-02-01 RX ORDER — SODIUM CHLORIDE 9 MG/ML
INJECTION, SOLUTION INTRAVENOUS PRN
Status: DISCONTINUED | OUTPATIENT
Start: 2023-02-01 | End: 2023-02-03 | Stop reason: HOSPADM

## 2023-02-01 RX ORDER — ONDANSETRON 2 MG/ML
4 INJECTION INTRAMUSCULAR; INTRAVENOUS ONCE
Status: COMPLETED | OUTPATIENT
Start: 2023-02-01 | End: 2023-02-01

## 2023-02-01 RX ORDER — FENTANYL CITRATE 50 UG/ML
25 INJECTION, SOLUTION INTRAMUSCULAR; INTRAVENOUS EVERY 5 MIN PRN
Status: DISCONTINUED | OUTPATIENT
Start: 2023-02-01 | End: 2023-02-01 | Stop reason: HOSPADM

## 2023-02-01 RX ORDER — ACETAMINOPHEN 650 MG/1
650 SUPPOSITORY RECTAL EVERY 6 HOURS PRN
Status: DISCONTINUED | OUTPATIENT
Start: 2023-02-01 | End: 2023-02-03 | Stop reason: HOSPADM

## 2023-02-01 RX ORDER — PROMETHAZINE HYDROCHLORIDE 25 MG/1
12.5 TABLET ORAL EVERY 6 HOURS PRN
Status: DISCONTINUED | OUTPATIENT
Start: 2023-02-01 | End: 2023-02-03 | Stop reason: HOSPADM

## 2023-02-01 RX ORDER — KETOROLAC TROMETHAMINE 30 MG/ML
15 INJECTION, SOLUTION INTRAMUSCULAR; INTRAVENOUS ONCE
Status: COMPLETED | OUTPATIENT
Start: 2023-02-01 | End: 2023-02-01

## 2023-02-01 RX ORDER — SODIUM CHLORIDE 9 MG/ML
INJECTION, SOLUTION INTRAVENOUS PRN
Status: DISCONTINUED | OUTPATIENT
Start: 2023-02-01 | End: 2023-02-01 | Stop reason: HOSPADM

## 2023-02-01 RX ORDER — ENOXAPARIN SODIUM 100 MG/ML
40 INJECTION SUBCUTANEOUS EVERY 24 HOURS
Status: DISCONTINUED | OUTPATIENT
Start: 2023-02-01 | End: 2023-02-03 | Stop reason: HOSPADM

## 2023-02-01 RX ORDER — ACETAMINOPHEN 325 MG/1
650 TABLET ORAL ONCE
Status: COMPLETED | OUTPATIENT
Start: 2023-02-01 | End: 2023-02-01

## 2023-02-01 RX ORDER — ONDANSETRON 2 MG/ML
4 INJECTION INTRAMUSCULAR; INTRAVENOUS EVERY 6 HOURS PRN
Status: DISCONTINUED | OUTPATIENT
Start: 2023-02-01 | End: 2023-02-03 | Stop reason: HOSPADM

## 2023-02-01 RX ORDER — ONDANSETRON 2 MG/ML
4 INJECTION INTRAMUSCULAR; INTRAVENOUS
Status: DISCONTINUED | OUTPATIENT
Start: 2023-02-01 | End: 2023-02-01 | Stop reason: HOSPADM

## 2023-02-01 RX ORDER — SODIUM CHLORIDE 9 MG/ML
INJECTION, SOLUTION INTRAVENOUS CONTINUOUS PRN
Status: DISCONTINUED | OUTPATIENT
Start: 2023-02-01 | End: 2023-02-01 | Stop reason: SDUPTHER

## 2023-02-01 RX ORDER — MORPHINE SULFATE 4 MG/ML
4 INJECTION, SOLUTION INTRAMUSCULAR; INTRAVENOUS ONCE
Status: COMPLETED | OUTPATIENT
Start: 2023-02-01 | End: 2023-02-01

## 2023-02-01 RX ADMIN — SODIUM CHLORIDE: 9 INJECTION, SOLUTION INTRAVENOUS at 20:52

## 2023-02-01 RX ADMIN — Medication 20 MG: at 17:22

## 2023-02-01 RX ADMIN — DEXAMETHASONE SODIUM PHOSPHATE 8 MG: 4 INJECTION, SOLUTION INTRAMUSCULAR; INTRAVENOUS at 17:00

## 2023-02-01 RX ADMIN — Medication 100 MCG: at 17:10

## 2023-02-01 RX ADMIN — CEFTRIAXONE 1000 MG: 1 INJECTION, POWDER, FOR SOLUTION INTRAMUSCULAR; INTRAVENOUS at 15:31

## 2023-02-01 RX ADMIN — SODIUM CHLORIDE 1000 ML: 9 INJECTION, SOLUTION INTRAVENOUS at 14:13

## 2023-02-01 RX ADMIN — LIDOCAINE HYDROCHLORIDE 80 MG: 20 INJECTION, SOLUTION EPIDURAL; INFILTRATION; INTRACAUDAL; PERINEURAL at 16:54

## 2023-02-01 RX ADMIN — ONDANSETRON 4 MG: 2 INJECTION INTRAMUSCULAR; INTRAVENOUS at 17:00

## 2023-02-01 RX ADMIN — ONDANSETRON 4 MG: 2 INJECTION INTRAMUSCULAR; INTRAVENOUS at 14:09

## 2023-02-01 RX ADMIN — ENOXAPARIN SODIUM 40 MG: 100 INJECTION SUBCUTANEOUS at 20:48

## 2023-02-01 RX ADMIN — Medication 200 MCG: at 17:09

## 2023-02-01 RX ADMIN — PROPOFOL 160 MG: 10 INJECTION, EMULSION INTRAVENOUS at 16:54

## 2023-02-01 RX ADMIN — Medication 100 MCG: at 16:59

## 2023-02-01 RX ADMIN — Medication 100 MG: at 16:54

## 2023-02-01 RX ADMIN — ACETAMINOPHEN 650 MG: 325 TABLET ORAL at 20:48

## 2023-02-01 RX ADMIN — ACETAMINOPHEN 650 MG: 325 TABLET ORAL at 14:08

## 2023-02-01 RX ADMIN — MORPHINE SULFATE 4 MG: 4 INJECTION, SOLUTION INTRAMUSCULAR; INTRAVENOUS at 14:16

## 2023-02-01 RX ADMIN — FENTANYL CITRATE 50 MCG: 50 INJECTION INTRAMUSCULAR; INTRAVENOUS at 16:54

## 2023-02-01 RX ADMIN — Medication 100 MCG: at 17:03

## 2023-02-01 RX ADMIN — Medication 100 MCG: at 17:06

## 2023-02-01 RX ADMIN — KETOROLAC TROMETHAMINE 15 MG: 30 INJECTION, SOLUTION INTRAMUSCULAR; INTRAVENOUS at 14:10

## 2023-02-01 RX ADMIN — SODIUM CHLORIDE 1000 ML: 9 INJECTION, SOLUTION INTRAVENOUS at 15:28

## 2023-02-01 RX ADMIN — SODIUM CHLORIDE: 9 INJECTION, SOLUTION INTRAVENOUS at 16:50

## 2023-02-01 RX ADMIN — SODIUM CHLORIDE, PRESERVATIVE FREE 10 ML: 5 INJECTION INTRAVENOUS at 20:52

## 2023-02-01 ASSESSMENT — PAIN - FUNCTIONAL ASSESSMENT
PAIN_FUNCTIONAL_ASSESSMENT: PREVENTS OR INTERFERES WITH ALL ACTIVE AND SOME PASSIVE ACTIVITIES
PAIN_FUNCTIONAL_ASSESSMENT: 0-10
PAIN_FUNCTIONAL_ASSESSMENT: 0-10

## 2023-02-01 ASSESSMENT — PAIN DESCRIPTION - ORIENTATION
ORIENTATION: RIGHT
ORIENTATION: RIGHT;MID

## 2023-02-01 ASSESSMENT — PAIN DESCRIPTION - LOCATION
LOCATION: FLANK;ABDOMEN
LOCATION: FLANK
LOCATION: FLANK

## 2023-02-01 ASSESSMENT — PAIN DESCRIPTION - ONSET: ONSET: ON-GOING

## 2023-02-01 ASSESSMENT — PAIN SCALES - GENERAL
PAINLEVEL_OUTOF10: 3
PAINLEVEL_OUTOF10: 8
PAINLEVEL_OUTOF10: 3
PAINLEVEL_OUTOF10: 10
PAINLEVEL_OUTOF10: 0
PAINLEVEL_OUTOF10: 10

## 2023-02-01 ASSESSMENT — PAIN DESCRIPTION - PAIN TYPE: TYPE: ACUTE PAIN

## 2023-02-01 ASSESSMENT — LIFESTYLE VARIABLES
HOW OFTEN DO YOU HAVE A DRINK CONTAINING ALCOHOL: NEVER
HOW OFTEN DO YOU HAVE A DRINK CONTAINING ALCOHOL: MONTHLY OR LESS
HOW MANY STANDARD DRINKS CONTAINING ALCOHOL DO YOU HAVE ON A TYPICAL DAY: 1 OR 2

## 2023-02-01 ASSESSMENT — PAIN DESCRIPTION - FREQUENCY: FREQUENCY: CONTINUOUS

## 2023-02-01 ASSESSMENT — PAIN DESCRIPTION - DESCRIPTORS
DESCRIPTORS: SHARP
DESCRIPTORS: SHARP

## 2023-02-01 NOTE — CONSULTS
Consulting Physician: Dr. Clement Gong    Reason for Consult: obstructing stone    History of Present Illness: Noemí Webster is a 56 y.o. male with history of asthma, HTN, right flank pain. Flank pain started on Saturday, went away then returned on Monday. He was seen 2 days ago and diagnosed with a 3mm stone in the distal right ureter. Pain persisted and migrated a bit forward so he returned to the ED. Initially was going to hold off on procedure this evening but he has developed fever of 101.2 and lactic acid of 2.1. UA appears non-infected. Blood culture has been drawn. Past Medical History:   Past Medical History:   Diagnosis Date    Arthritis     Asthma     as a child--no problems in past 25 years    High blood pressure     Kidney stone        Past Surgical History:  Past Surgical History:   Procedure Laterality Date    BACK SURGERY  2010    CERVICAL [address] per Dr. Dr. Viviana Li  02/06/2017    cysto, attempted ureteroscopy, left retrograde pyelogram    HERNIA REPAIR Right     INGUINAL HERNIA REPAIR Left 4.28.11    JOINT REPLACEMENT Right 12-    knee    KNEE ARTHROSCOPY      2004(pt unsure of which knee) 2012-both knees scoped    TOTAL HIP ARTHROPLASTY Right 11/4/2020    RIGHT ANTERIOR TOTAL HIP REPLACEMENT WITH C-ARM performed by Kian Kimbrough MD at [facility] Left 02/07/2017    DR. Yovani Ledezma - [id number]       Social History:  Social History     Socioeconomic History    Marital status:      Spouse name: Not on file    Number of children: 4    Years of education: Not on file    Highest education level: Not on file   Occupational History    Occupation:     Tobacco Use    Smoking status: Never    Smokeless tobacco: Never   Vaping Use    Vaping Use: Never used   Substance and Sexual Activity    Alcohol use:  Yes     Alcohol/week: 0.0 standard drinks     Comment: socially    Drug use: No    Sexual activity: Yes     Partners: Female   Other Topics Concern    Not on file   Social History Narrative    Not on file     Social Determinants of Health     Financial Resource Strain: Low Risk     Difficulty of Paying Living Expenses: Not hard at all   Food Insecurity: No Food Insecurity    Worried About Running Out of Food in the Last Year: Never true    Ran Out of Food in the Last Year: Never true   Transportation Needs: Not on file   Physical Activity: Not on file   Stress: Not on file   Social Connections: Not on file   Intimate Partner Violence: Not on file   Housing Stability: Not on file       Family History:  Family History   Problem Relation Age of Onset    Crohn's Disease Mother     Diabetes Father     Arthritis Other     Cancer Other     High Blood Pressure Other        Meds:   Current Facility-Administered Medications: cefTRIAXone (ROCEPHIN) 1,000 mg in sodium chloride 0.9 % 50 mL IVPB (mini-bag), 1,000 mg, IntraVENous, Once  0.9 % sodium chloride bolus, 1,000 mL, IntraVENous, Once    Review of Systems:  10 Systems were reviewed and negative except as in HPI    Vitals:  BP 99/62   Pulse 85   Temp 98.8 °F (37.1 °C) (Oral)   Resp 15   Ht 5' 8\" (1.727 m)   Wt 193 lb 5.5 oz (87.7 kg)   SpO2 94%   BMI 29.40 kg/m²   No intake or output data in the 24 hours ending 02/01/23 1550    Physical Exam:  General Appearance: Alert and oriented, cooperative, no distress, appears stated age  Head: Normocephalic, without obvious abnormality, atraumatic  Back: no CVA tenderness  Lungs: respirations unlabored, no wheezing  Heart: Regular rate and rhythm, no lower extremity edema noted  Abdomen: Soft, non-tender, non-distended, no masses  Skin: Skin color, texture, turgor normal, no rashes or lesions  Neurologic: no gross deficits  Male :  Nonpalpable bladder  No CVA tenderness  Spontaneously voiding  ABIGAIL Not indicated    Labs:  CBC   Lab Results   Component Value Date/Time    WBC 9.7 02/01/2023 12:16 PM    RBC 4.81 02/01/2023 12:16 PM    HGB 13.9 02/01/2023 12:16 PM HCT 42.4 02/01/2023 12:16 PM    MCV 88.2 02/01/2023 12:16 PM    MCH 28.9 02/01/2023 12:16 PM    MCHC 32.7 02/01/2023 12:16 PM    RDW 13.3 02/01/2023 12:16 PM     02/01/2023 12:16 PM    MPV 8.3 02/01/2023 12:16 PM     BMP   Lab Results   Component Value Date/Time     02/01/2023 12:16 PM    K 4.8 02/01/2023 12:16 PM    CL 96 02/01/2023 12:16 PM    CO2 23 02/01/2023 12:16 PM    BUN 16 02/01/2023 12:16 PM    CREATININE 1.6 02/01/2023 12:16 PM    GLUCOSE 140 02/01/2023 12:16 PM    CALCIUM 9.3 02/01/2023 12:16 PM       Urinalysis:   Lab Results   Component Value Date/Time    COLORU Yellow 02/01/2023 02:20 PM    GLUCOSEU Negative 02/01/2023 02:20 PM    BLOODU Negative 02/01/2023 02:20 PM    NITRU Negative 02/01/2023 02:20 PM    LEUKOCYTESUR Negative 02/01/2023 02:20 PM       Imaging:  Pertinent images and radiologist's report were reviewed independently  CT abdomen/pelvis 1/30/23  Impression   There is a 3 mm distal right ureteral calculus causing moderate right-sided   obstruction. Punctate nonobstructing right upper pole renal calculus. Normal appendix. Small hiatal hernia. Diverticulosis. Impression/Plan:   - 57y. o. male with a 3mm distal right ureteral stone with hydro. DIANE. - Developed fever in ED, lactic acid 2.1, creatinine 1.6  - Will add on for urgent cystoscopy with right ureteral stent insertion with Dr. Balbir Truong. Keep NPO.   - Ureteroscopy in 1-2 weeks. Our office will call him to arrange this.     Lb Wood, APRN - CNP 3/5/13660:71 PM

## 2023-02-01 NOTE — ANESTHESIA PRE PROCEDURE
[facility] Department of Anesthesiology  Pre-Anesthesia Evaluation/Consultation       Name:  Rosa Gomez  : 1965  Age:  56 y. o. MRN:  [id number]  Date: 2023           Surgeon: Surgeon(s):  Theresa Breaux MD    Procedure: Procedure(s):  CYSTOSCOPY RETROGRADE PYELOGRAM RIGHT STENT INSERTION     Allergies   Allergen Reactions    Seasonal Other (See Comments)     Grass/mold/pollen--pt states does not take any medication for them     Patient Active Problem List   Diagnosis    ADD (attention deficit disorder)    Prepatellar bursitis of right knee    Essential hypertension    Adjustment disorder with depressed mood    Arthritis of right knee    Nodule of right lung    Mediastinal adenopathy    Flank pain     Past Medical History:   Diagnosis Date    Arthritis     Asthma     as a child--no problems in past 25 years    High blood pressure     Kidney stone      Past Surgical History:   Procedure Laterality Date    BACK SURGERY      CERVICAL [address] per Dr. Dr. Deb Pate  2017    cysto, attempted ureteroscopy, left retrograde pyelogram    HERNIA REPAIR Right     INGUINAL HERNIA REPAIR Left .    JOINT REPLACEMENT Right 2015    knee    KNEE ARTHROSCOPY      (pt unsure of which knee) -both knees scoped    TOTAL HIP ARTHROPLASTY Right 2020    RIGHT ANTERIOR TOTAL HIP REPLACEMENT WITH C-ARM performed by Tonia Zapata MD at [facility] Left 2017    DR. Lorenzo Rivera - [id number]     Social History     Tobacco Use    Smoking status: Never    Smokeless tobacco: Never   Vaping Use    Vaping Use: Never used   Substance Use Topics    Alcohol use: Yes     Alcohol/week: 0.0 standard drinks     Comment: socially    Drug use: No     Medications  No current facility-administered medications on file prior to encounter.      Current Outpatient Medications on File Prior to Encounter Medication Sig Dispense Refill    oxyCODONE-acetaminophen (PERCOCET) 5-325 MG per tablet Take 1 tablet by mouth every 6 hours as needed for Pain for up to 3 days. Intended supply: 3 days. Take lowest dose possible to manage pain Max Daily Amount: 4 tablets 12 tablet 0    ketorolac (TORADOL) 10 MG tablet Take 1 tablet by mouth every 6 hours as needed for Pain 20 tablet 0    tamsulosin (FLOMAX) 0.4 MG capsule Take 1 capsule by mouth daily 30 capsule 0    ondansetron (ZOFRAN) 4 MG tablet Take 1 tablet by mouth every 8 hours as needed for Nausea 20 tablet 0    lisinopril (PRINIVIL;ZESTRIL) 10 MG tablet TAKE ONE TABLET BY MOUTH DAILY 90 tablet 0    DUPIXENT 300 MG/2ML SOSY injection Inject 300 mg into the skin once a week        Current Facility-Administered Medications   Medication Dose Route Frequency Provider Last Rate Last Admin    0.9 % sodium chloride bolus  1,000 mL IntraVENous Once Nsehniitodoyle Lui .6 mL/hr at 23 1528 1,000 mL at 23 1528     Current Outpatient Medications   Medication Sig Dispense Refill    oxyCODONE-acetaminophen (PERCOCET) 5-325 MG per tablet Take 1 tablet by mouth every 6 hours as needed for Pain for up to 3 days. Intended supply: 3 days.  Take lowest dose possible to manage pain Max Daily Amount: 4 tablets 12 tablet 0    ketorolac (TORADOL) 10 MG tablet Take 1 tablet by mouth every 6 hours as needed for Pain 20 tablet 0    tamsulosin (FLOMAX) 0.4 MG capsule Take 1 capsule by mouth daily 30 capsule 0    ondansetron (ZOFRAN) 4 MG tablet Take 1 tablet by mouth every 8 hours as needed for Nausea 20 tablet 0    lisinopril (PRINIVIL;ZESTRIL) 10 MG tablet TAKE ONE TABLET BY MOUTH DAILY 90 tablet 0    DUPIXENT 300 MG/2ML SOSY injection Inject 300 mg into the skin once a week        Vital Signs (Current)   Vitals:    23 1520   BP: 99/62   Pulse: 85   Resp: 15   Temp: 98.8 °F (37.1 °C)   SpO2:      Vital Signs Statistics (for past 48 hrs)     Temp  Av.3 °F (37.4 °C)  Min: 98 °F (36.7 °C)   Min taken time: 23 1148  Max: 101.2 °F (38.4 °C)   Max taken time: 23 1400  Pulse  Av.2  Min: 80   Min taken time: 23 1415  Max: 103   Max taken time: 23 1430  Resp  Av.4  Min: 15   Min taken time: 23 1520  Max: 20   Max taken time: 23 1148  BP  Min: 99/62   Min taken time: 23 1520  Max: 122/76   Max taken time: 23 1400  MAP (mmHg)  Av.3  Min: 70   Min taken time: 23 1520  Max: 90   Max taken time: 23 1400  SpO2  Av.5 %  Min: 92 %   Min taken time: 23 1148  Max: 96 %   Max taken time: 23 1415    BP Readings from Last 3 Encounters:   23 99/62   23 (!) 174/87   23 130/76     BMI  Body mass index is 29.4 kg/m². Estimated body mass index is 29.4 kg/m² as calculated from the following:    Height as of this encounter: 5' 8\" (1.727 m). Weight as of this encounter: 193 lb 5.5 oz (87.7 kg).     CBC   Lab Results   Component Value Date/Time    WBC 9.7 2023 12:16 PM    RBC 4.81 2023 12:16 PM    HGB 13.9 2023 12:16 PM    HCT 42.4 2023 12:16 PM    MCV 88.2 2023 12:16 PM    RDW 13.3 2023 12:16 PM     2023 12:16 PM     CMP    Lab Results   Component Value Date/Time     2023 12:16 PM    K 4.8 2023 12:16 PM    CL 96 2023 12:16 PM    CO2 23 2023 12:16 PM    BUN 16 2023 12:16 PM    CREATININE 1.6 2023 12:16 PM    GFRAA >60 2021 08:52 AM    AGRATIO 1.4 2023 12:16 PM    LABGLOM 50 2023 12:16 PM    GLUCOSE 140 2023 12:16 PM    PROT 6.5 2023 12:16 PM    CALCIUM 9.3 2023 12:16 PM    BILITOT 1.0 2023 12:16 PM    ALKPHOS 62 2023 12:16 PM    AST 28 2023 12:16 PM    ALT 20 2023 12:16 PM     BMP    Lab Results   Component Value Date/Time     2023 12:16 PM    K 4.8 2023 12:16 PM    CL 96 2023 12:16 PM    CO2 23 2023 12:16 PM    BUN 16 02/01/2023 12:16 PM    CREATININE 1.6 02/01/2023 12:16 PM    CALCIUM 9.3 02/01/2023 12:16 PM    GFRAA >60 12/21/2021 08:52 AM    LABGLOM 50 02/01/2023 12:16 PM    GLUCOSE 140 02/01/2023 12:16 PM     POCGlucose  Recent Labs     01/30/23  1247 02/01/23  1216   GLUCOSE 117* 140*      Coags    Lab Results   Component Value Date/Time    PROTIME 11.3 10/19/2020 12:00 PM    INR 0.97 10/19/2020 12:00 PM    APTT 31.2 10/19/2020 12:00 PM     HCG (If Applicable) No results found for: PREGTESTUR, PREGSERUM, HCG, HCGQUANT   ABGs No results found for: PHART, PO2ART, PQC8ZNJ, KGQ5IMB, BEART, S7TOXTIQ   Type & Screen (If Applicable)  No results found for: LABABO, LABRH                         BMI: Wt Readings from Last 3 Encounters:       NPO Status: banana bread 3 hours ago                          Anesthesia Evaluation  Patient summary reviewed no history of anesthetic complications:   Airway: Mallampati: III  TM distance: >3 FB   Neck ROM: full  Mouth opening: > = 3 FB   Dental: normal exam         Pulmonary:normal exam    (+) asthma:                            Cardiovascular:  Exercise tolerance: good (>4 METS),   (+) hypertension:,       ECG reviewed  Rhythm: regular  Rate: normal           Beta Blocker:  Not on Beta Blocker         Neuro/Psych:   (+) psychiatric history:depression/anxiety             GI/Hepatic/Renal:   (+) renal disease: kidney stones,      (-) GERD       Endo/Other:        (-) diabetes mellitus, blood dyscrasia               Abdominal:             Vascular: negative vascular ROS. Other Findings:           Anesthesia Plan      general     ASA 3 - emergent       Induction: intravenous. MIPS: Postoperative opioids intended and Prophylactic antiemetics administered. Anesthetic plan and risks discussed with patient and spouse. Plan discussed with CRNA.                 This pre-anesthesia assessment may be used as a history and physical.    DOS STAFF ADDENDUM:    Pt seen and examined, chart reviewed (including anesthesia, drug and allergy history). No interval changes to history and physical examination. Anesthetic plan, risks, benefits, alternatives, and personnel involved discussed with patient. Questions and concerns addressed. Patient(family) verbalized an understanding and agrees to proceed.       Juliet Mercer MD  February 1, 2023  4:15 PM

## 2023-02-01 NOTE — ED NOTES
Pt to ED rm 9 at this time. Pt to ED with c/o right side flank pain. Pt states he was seen 2 days ago for same symptoms and dx with kidney stones. Pt states the pain has continued to worsen since then. States he has been straining his urine at home and has not yet seen any stones. Pt alert and oriented x4 upon ED arrival, tachycardic with hr in the low 100's, temp 101.2 and rates pain 10/10. Pt placed on cardiac monitor, IV in place and meds being given as ordered by ED MD.  Call light in reach, will continue to monitor closely.             Mamta Flynn, RN  02/01/23 [id number]

## 2023-02-01 NOTE — ED NOTES
Pt to OR at this time via stretcher. Report given to OR BENITA August Houston, all questions answered.       Alxe Dowell RN  02/01/23 3686

## 2023-02-01 NOTE — LETTER
[address]  Phone: [phone]    No name on file. February 3, 2023     Patient: Poonam Rivera   YOB: 1965   Date of Visit: 1/30/23       To Whom It May Concern: It is my medical opinion that Polo Rush  Can return back to work on 2/4/23. If you have any questions or concerns, please don't hesitate to call.     Sincerely,        [facility] [place]

## 2023-02-01 NOTE — ED TRIAGE NOTES
Was here 2 days ago for kidney stones, given pain medication and went home. Has not yet seen any stones when straining his urine. States that pain has spread toward front of his body. Sitting up with easy breathing, at times breathing heavy, states due to pain.

## 2023-02-01 NOTE — H&P
Hospital Medicine History & Physical      PCP: Jessica Rajan MD    Date of Admission: 2/1/2023    Chief Complaint:    Chief Complaint   Patient presents with    Flank Pain     Was here 2 days ago for kidney stones, given pain medication and went home. Has not yet seen any stones when straining his urine. States that pain has spread toward front of his body. History Of Present Illness:      Patient is a 61-year-old male with past medical history of kidney stones, asthma, hypertension who presents to the hospital for right-sided flank pain. According patient he just started yesterday, got worse, initially it was a back pain and then it moved and felt like right-sided flank pain. He denies associated hematuria frequency urination but endorses he had fever. He says his last episode of kidney stones was around 7 years ago. Denied chest pain shortness of breath nausea vomiting. Past Medical History:          Diagnosis Date    Arthritis     Asthma     as a child--no problems in past 25 years    High blood pressure     Kidney stone        Past Surgical History:          Procedure Laterality Date    BACK SURGERY  2010    CERVICAL DISC SURGERY per Dr. Dr. Austin Menchaca  02/06/2017    cysto, attempted ureteroscopy, left retrograde pyelogram    HERNIA REPAIR Right     INGUINAL HERNIA REPAIR Left 4.28.11    JOINT REPLACEMENT Right 12-    knee    KNEE ARTHROSCOPY      2004(pt unsure of which knee) 2012-both knees scoped    TOTAL HIP ARTHROPLASTY Right 11/4/2020    RIGHT ANTERIOR TOTAL HIP REPLACEMENT WITH C-ARM performed by Jasmyne Obrien MD at [facility] Left 02/07/2017    DR. Ace Beltran - 1O75       Medications Prior to Admission:      Prior to Admission medications    Medication Sig Start Date End Date Taking? Authorizing Provider   oxyCODONE-acetaminophen (PERCOCET) 5-325 MG per tablet Take 1 tablet by mouth every 6 hours as needed for Pain for up to 3 days. Intended supply: 3 days. Take lowest dose possible to manage pain Max Daily Amount: 4 tablets 1/31/23 2/3/23  Iris Saab MD   ketorolac (TORADOL) 10 MG tablet Take 1 tablet by mouth every 6 hours as needed for Pain 1/30/23   Shiv Rodriguez PA-C   tamsulosin [facility]) 0.4 MG capsule Take 1 capsule by mouth daily 1/30/23   Shiv Rodriguez PA-C   ondansetron (ZOFRAN) 4 MG tablet Take 1 tablet by mouth every 8 hours as needed for Nausea 1/30/23   Shiv Rodriguez PA-C   lisinopril (PRINIVIL;ZESTRIL) 10 MG tablet TAKE ONE TABLET BY MOUTH DAILY 1/13/23   Iris Saab MD   DUPIXENT 300 MG/2ML SOSY injection Inject 300 mg into the skin once a week  4/27/20   Historical Provider, MD       Allergies:  Seasonal    Social History:      TOBACCO:   reports that he has never smoked. He has never used smokeless tobacco.  ETOH:   reports current alcohol use. Family History:       Reviewed in detail and non contributory          Problem Relation Age of Onset    Crohn's Disease Mother     Diabetes Father     Arthritis Other     Cancer Other     High Blood Pressure Other        REVIEW OF SYSTEMS:   Pertinent positives as noted in the HPI. All other systems reviewed and negative. PHYSICAL EXAM PERFORMED:    BP (!) 87/59   Pulse 99   Temp 97.8 °F (36.6 °C)   Resp 16   Ht 5' 8\" (1.727 m)   Wt 193 lb 5.5 oz (87.7 kg)   SpO2 97%   BMI 29.40 kg/m²     General appearance:  No apparent distress, cooperative. HEENT:  Normal cephalic, atraumatic without obvious deformity. Conjunctivae/corneas clear. Neck: Supple, with full range of motion. No cervical lymphadenopathy  Respiratory:  Normal respiratory effort. Clear to auscultation, bilaterally without Rales/Wheezes/Rhonchi. Cardiovascular:  Regular rate and rhythm with normal S1/S2 without murmurs, rubs or gallops. Abdomen: Soft, tender on palpation to right flank, non-distended, normal bowel sounds. Musculoskeletal:  No edema noted bilaterally.  No tenderness on palpation   Skin: no rash visible  Neurologic:  Neurologically intact without any focal sensory/motor deficits. grossly non-focal.  Psychiatric:  Alert and oriented, normal mood  Peripheral Pulses: +2 palpable, equal bilaterally       Labs:     Recent Labs     01/30/23  1247 02/01/23  1216   WBC 8.4 9.7   HGB 13.5 13.9   HCT 41.2 42.4    178     Recent Labs     01/30/23  1247 02/01/23  1216   * 129*   K 4.2 4.8   CL 99 96*   CO2 25 23   BUN 9 16   CREATININE 0.9 1.6*   CALCIUM 9.2 9.3     Recent Labs     01/30/23  1247 02/01/23  1216   AST 25 28   ALT 19 20   BILITOT 0.7 1.0   ALKPHOS 61 62     No results for input(s): INR in the last 72 hours. No results for input(s): [place]ne Knock in the last 72 hours. Urinalysis:      Lab Results   Component Value Date/Time    NITRU Negative 02/01/2023 02:20 PM    WBCUA 4 02/06/2017 08:35 AM    RBCUA 15 02/06/2017 08:35 AM    BLOODU Negative 02/01/2023 02:20 PM    SPECGRAV 1.005 02/01/2023 02:20 PM    GLUCOSEU Negative 02/01/2023 02:20 PM       Radiology:       No orders to display           Active Hospital Problems    Diagnosis Date Noted    Flank pain [R10.9] 02/01/2023     Priority: Medium       Patient is a 29-year-old male with past medical history of kidney stones, asthma, hypertension who presents to the hospital for right-sided flank pain. According patient he just started yesterday, got worse, initially it was a back pain and then it moved and felt like right-sided flank pain. He denies associated hematuria frequency urination but endorses he had fever. He says his last episode of kidney stones was around 7 years ago. Denied chest pain shortness of breath nausea vomiting.     Assessment  Right-sided flank pain likely secondary to nephrolithiasis  Fevers-unclear etiology at this time  Acute kidney injury  Lactic acidosis  Sepsis present on admission likely source urinary    Plan  Start IV Rocephin  Check blood cultures, urine culture  Monitor and replace electrolytes  IV fluids  Pain control  Urology was consulted from ED, patient is status post cystoscopy, await for the results  Resume home medications  DVT prophylaxis-Lovenox  Diet: Diet NPO  Code Status: Prior    PT/OT Eval Status: ordered    Dispo - pending clinical improvement       Terrence Jay MD    The note was completed using EMR and Dragon dictation system. Every effort was made to ensure accuracy; however, inadvertent computerized transcription errors may be present. Thank you Edy Smith MD for the opportunity to be involved in this patient's care. If you have any questions or concerns please feel free to contact me at [phone].     Terrence Jay MD

## 2023-02-01 NOTE — OP NOTE
Operative Note      Patient: Hernan Mcarthur  YOB: 1965  MRN: [id number]    Date of Procedure: 2/1/2023    Pre-Op Diagnosis: Right kidney stone    Post-Op Diagnosis: Same       Procedure(s):  CYSTOSCOPY RETROGRADE PYELOGRAM RIGHT STENT INSERTION    Surgeon(s):  Armando eHnry MD    Assistant:   * No surgical staff found *    Anesthesia: General    Estimated Blood Loss (mL): Minimal    Complications: None    Specimens:   * No specimens in log *    Implants:  Implant Name Type Inv. Item Serial No.  Lot No. LRB No. Used Action   STENT URET 6FR L26CM PERCFLX HYDR+ Emmanuelle Kamille [id number]  Formerly [facility] 6FR L26CM PERCFLX HYDR+ TAPR TIP GRAD  [facility]- [id number] Right 1 Implanted         Drains: * No LDAs found *    Findings: normal cystoscopy    Detailed Description of Procedure: The patient was met in the preoperative area and surgical procedure discussed. All questions and concerns were answered. He was brought back to the operating room where anesthesia was induced. He received preoperative antibiotics according to antibiotic guidelines. Next, he was placed in the dorsal lithotomy position and prepped and draped in the usual sterile fashion. All pressure points were appropriately padded. Next, a time out was performed confirming the correct patient, procedure and laterality. The procedure was started by placing a 21F cystoscope per urethra into the patient's bladder. Pan cystoscopy revealed no abnormalities. A sensor wire was used to access the right ureteral orifice and placed to the level of the kidney as seen on fluoroscopy. Next a [id number] JJ ureteral stent was placed over the wire with position into the renal pelvis noted on fluoroscopically and good curl of the stent noted visually within the bladder. His bladder was then drained and the procedure deemed complete. He was awoken from anesthesia and transferred to the PACU in stable condition.       He will follow up with urology in 2 weeks for definitive stone treatment once infection treated.          Electronically signed by Margrett Primrose, MD on 2/1/2023 at 5:08 PM

## 2023-02-01 NOTE — PROGRESS NOTES
Patient arrived to room  from PACU. Patient A&Ox4, VSS. Patient reports mild lower abdomen pain, declines pain medicine. Patient and spouse oriented to the room and instructed to call out for needs. Will continue to monitor.

## 2023-02-01 NOTE — ED PROVIDER NOTES
Primary Care Physician: Benny Orellana MD   Attending Physician: Tosha Slater MD     History   Chief Complaint   Patient presents with    Flank Pain     Was here 2 days ago for kidney stones, given pain medication and went home. Has not yet seen any stones when straining his urine. States that pain has spread toward front of his body. JIM Carlos  is a 56 y.o. male history of asthma, hypertension, kidney stones who presents complaining of right flank pain. Patient was seen here 2 days ago when he presented with flank pain. CT abdomen pelvis obtained and showed a 3 mm stone in the UVJ junction. Patient was discharged home but returns today stating that the pain has persisted and now he is feeling the pain down his right groin. Stated that he is having frequency urinating. Denies fevers chills nausea vomiting. No chest pain or shortness of breath. Past Medical History:   Diagnosis Date    Arthritis     Asthma     as a child--no problems in past 25 years    High blood pressure     Kidney stone         Past Surgical History:   Procedure Laterality Date    BACK SURGERY  2010    CERVICAL DISC SURGERY per Dr. Dr. Sabrina Tellez  02/06/2017    cysto, attempted ureteroscopy, left retrograde pyelogram    HERNIA REPAIR Right     INGUINAL HERNIA REPAIR Left 4.28.11    JOINT REPLACEMENT Right 12-    knee    KNEE ARTHROSCOPY      2004(pt unsure of which knee) 2012-both knees scoped    TOTAL HIP ARTHROPLASTY Right 11/4/2020    RIGHT ANTERIOR TOTAL HIP REPLACEMENT WITH C-ARM performed by Chris Motta MD at [address] [facility] Left 02/07/2017    DR. Thelma Jara Iram.[facility]        Family History   Problem Relation Age of Onset    Crohn's Disease Mother     Diabetes Father     Arthritis Other     Cancer Other     High Blood Pressure Other         Social History     Socioeconomic History    Marital status:      Spouse name: None    Number of children: 4    Years of education: None    Highest education level: None   Occupational History    Occupation:     Tobacco Use    Smoking status: Never    Smokeless tobacco: Never   Vaping Use    Vaping Use: Never used   Substance and Sexual Activity    Alcohol use: Yes     Alcohol/week: 0.0 standard drinks     Comment: socially    Drug use: No    Sexual activity: Yes     Partners: Female     Social Determinants of Health     Financial Resource Strain: Low Risk     Difficulty of Paying Living Expenses: Not hard at all   Food Insecurity: No Food Insecurity    Worried About [employer] in the Last Year: Never true    Ran Out of Food in the Last Year: Never true        Review of Systems   10 total systems reviewed and found to be negative unless otherwise noted in HPI     Physical Exam   /72   Pulse (!) 102   Temp 98 °F (36.7 °C) (Oral)   Resp 20   Ht 5' 8\" (1.727 m)   Wt 193 lb 5.5 oz (87.7 kg)   SpO2 92%   BMI 29.40 kg/m²      Physical Exam  Vitals and nursing note reviewed. Constitutional:       General: Pt is  in acute distress. Appearance: Pt is well appearing. He is not diaphoretic. HENT:      Head: Normocephalic and atraumatic. Right Ear: Tympanic membrane normal.      Left Ear: Tympanic membrane normal.      Nose: Nose normal.      Mouth/Throat:      Mouth: Mucous membranes are moist.      Pharynx: Oropharynx is clear. Eyes:      Extraocular Movements: Extraocular movements intact. Conjunctiva/sclera: Conjunctivae normal.      Pupils: Pupils are equal, round, and reactive to light. Cardiovascular:      Rate and Rhythm: Normal rate and regular rhythm. Pulses: Normal pulses. Heart sounds: Normal heart sounds. No murmur heard. No gallop. Pulmonary:      Effort: Pulmonary effort is normal.      Breath sounds: Normal breath sounds. Abdominal:      General: Bowel sounds are normal. There is no distension. Palpations: Abdomen is soft. Tenderness:  There is abdominal tenderness. There is no guarding or rebound. Musculoskeletal:         General: No tenderness. Normal range of motion. Cervical back: Normal range of motion and neck supple. Right foot: Normal.      Left foot: Normal. There is no leg swelling, deformity, no tenderness. Normal pulse. Skin:     General: Skin is warm and dry. Capillary Refill: Capillary refill takes less than 2 seconds. Findings: No bruising or erythema. Neurological:      General: No focal deficit present. Mental Status: He is alert and oriented to person, place, and time. Gait: Gait normal.     DIAGNOSTIC RESULTS:  LABS:  Labs Reviewed   CBC WITH AUTO DIFFERENTIAL - Abnormal; Notable for the following components:       Result Value    Neutrophils Absolute 8.8 (*)     Lymphocytes Absolute 0.4 (*)     All other components within normal limits   COMPREHENSIVE METABOLIC PANEL W/ REFLEX TO MG FOR LOW K - Abnormal; Notable for the following components:    Sodium 129 (*)     Chloride 96 (*)     Glucose 140 (*)     Creatinine 1.6 (*)     Est, Glom Filt Rate 50 (*)     All other components within normal limits   LIPASE   URINALYSIS WITH REFLEX TO CULTURE       All other labs were withinnormal range or not returned as of this dictation    RADIOLOGY:   Non-plain film images such as CT, Ultrasoundand MRI are read by the radiologist. Plain radiographic images are visualized and preliminarily interpreted by the emergency physician with the below findings:  No orders to display     CT ABDOMEN PELVIS WO CONTRAST Additional Contrast? None    Result Date: 1/30/2023  EXAMINATION: CT OF THE ABDOMEN AND PELVIS WITHOUT CONTRAST 1/30/2023 12:33 pm TECHNIQUE: CT of the abdomen and pelvis was performed without the administration of intravenous contrast. Multiplanar reformatted images are provided for review.  Automated exposure control, iterative reconstruction, and/or weight based adjustment of the mA/kV was utilized to reduce the radiation dose to as low as reasonably achievable. COMPARISON: 02/07/2017 HISTORY: ORDERING SYSTEM PROVIDED HISTORY: hx stones, right sided flank pain TECHNOLOGIST PROVIDED HISTORY: Reason for exam:->hx stones, right sided flank pain Additional Contrast?->None Decision Support Exception - unselect if not a suspected or confirmed emergency medical condition->Emergency Medical Condition (MA) Reason for Exam: hx stones, right sided flank pain FINDINGS: Lower Chest: Coronary calcification is present. There is a small hiatal hernia. An acute process is not identified at the lung bases. Organs: A focal hepatic abnormality is not identified. The gallbladder is unremarkable. A focal splenic abnormality is not appreciated. A focal pancreatic abnormality is not identified. The adrenal glands are unremarkable. The left kidney is not obstructed. A probable left renal cyst is unchanged. There is moderate right hydroureteronephrosis to the level of a distal right ureteral calculus. This measures 3 mm and is approximately 4 cm proximal to the ureterovesical junction. There is a punctate nonobstructing right upper pole calculus. GI/Bowel: The bowel is not obstructed. The appendix is within normal limits. Diverticulosis is present. Pelvis: There is no free fluid in the pelvis. The urinary bladder is unremarkable. Calcified phleboliths are noted in the pelvis. Peritoneum/Retroperitoneum: There is calcification of the abdominal aorta. There is no free intraperitoneal air. Bones/Soft Tissues: Streak artifact from right hip arthroplasty is noted. An acute osseous abnormality is not identified. An old left posterior rib fracture is noted. There is mild degenerative change of the spine. There is a 3 mm distal right ureteral calculus causing moderate right-sided obstruction. Punctate nonobstructing right upper pole renal calculus. Normal appendix. Small hiatal hernia. Diverticulosis.         ED BEDSIDE ULTRASOUND: Performed by ED Physician - none    EKG: None    EMERGENCY DEPARTMENT COURSE and DIFFERENTIAL DIAGNOSIS/MDM:   PMH, Surgical Hx, FH, Social Hx reviewed by myself (ETOH usage, Tobacco usage,   Drug usage reviewed by myself, no pertinent Hx)- No Pertinent History     Old records were reviewed by me     Medications - No data to display     PROCEDURES:   Procedures    MDM (ASSESSMENT AND PLAN):  [id number], Rosey Paiz is a 56 y.o. male history of asthma, hypertension, kidney stones who presents complaining of right flank pain. Patient was seen here 2 days ago when he presented with flank pain. CT abdomen pelvis obtained and showed a 3 mm stone in the UVJ junction. Patient was discharged home but returns today stating that the pain has persisted and now he is feeling the pain down his right groin. Stated that he is having frequency urinating. On exam patient appears nontoxic but in mild distress. Also having some tender to palpation lower abdomen. I am concerned that his pain is probably still secondary to his kidney stone. However I did not think he needed repeat imaging studies. I did obtain labs which showed elevated creatinine which I believe is probably from obstruction of the stone. Consulted with urology they are agreeable that patient be admitted for further evaluation and treatment. He might probably get the procedure done tomorrow. Hospitalist been consulted for admission. DDX-kidney stone  Prior notes-PMD notes reviewed  Name and route all medications-see meds section above  Charting interpretations all by myself  Diagnosis descriptions-severe  Consults-urology  Disposition- Considered -admission      I PERSONALLY SAW THE PATIENT AND PERFORMED A SUBSTANTIVE PORTION OF THE VISIT INCLUDING ALL ASPECTS OF THE MEDICAL DECISION MAKING PROCESS. The primary clinician of record Malena Webb MD    ClINICAL IMPRESSION:  1.  Nephrolithiasis     DISPOSITION Decision To Admit 02/01/2023 01:32:50 PM    ______________________________________________________________________  _____________________________________________________________________________________________  This record is transcribed utilizing voice recognition technology. There are inherent limitations in this technology. In addition, there may be limitations in editing of this report. If there are any discrepancies, please contact me directly.         Tyrone Young MD  02/01/23 [id number]

## 2023-02-01 NOTE — PROGRESS NOTES
Medication Reconciliation    List of medications patient is currently taking is complete. Source of information: 1. Conversation with patient at bedside                                      2. EPIC records      Allergies  Seasonal     Notes regarding home medications:   1. Patient states he had AM doses PTA  2. Last Dupixent injection was 2-3 weeks ago.  Currently talking to insurance about continuing coverage of medication      Miguelito Thomas [facility]   2/1/2023  1:47 PM

## 2023-02-02 LAB
ANION GAP SERPL CALCULATED.3IONS-SCNC: 10 MMOL/L (ref 3–16)
BASOPHILS ABSOLUTE: 0 K/UL (ref 0–0.2)
BASOPHILS RELATIVE PERCENT: 0.1 %
BUN BLDV-MCNC: 14 MG/DL (ref 7–20)
CALCIUM SERPL-MCNC: 8.6 MG/DL (ref 8.3–10.6)
CHLORIDE BLD-SCNC: 106 MMOL/L (ref 99–110)
CO2: 21 MMOL/L (ref 21–32)
CREAT SERPL-MCNC: 1 MG/DL (ref 0.9–1.3)
EOSINOPHILS ABSOLUTE: 0 K/UL (ref 0–0.6)
EOSINOPHILS RELATIVE PERCENT: 0 %
GFR SERPL CREATININE-BSD FRML MDRD: >60 ML/MIN/{1.73_M2}
GLUCOSE BLD-MCNC: 158 MG/DL (ref 70–99)
HCT VFR BLD CALC: 39.4 % (ref 40.5–52.5)
HEMOGLOBIN: 12.6 G/DL (ref 13.5–17.5)
LYMPHOCYTES ABSOLUTE: 0.5 K/UL (ref 1–5.1)
LYMPHOCYTES RELATIVE PERCENT: 3.9 %
MCH RBC QN AUTO: 28.9 PG (ref 26–34)
MCHC RBC AUTO-ENTMCNC: 32 G/DL (ref 31–36)
MCV RBC AUTO: 90.2 FL (ref 80–100)
MONOCYTES ABSOLUTE: 0.8 K/UL (ref 0–1.3)
MONOCYTES RELATIVE PERCENT: 6.6 %
NEUTROPHILS ABSOLUTE: 10.5 K/UL (ref 1.7–7.7)
NEUTROPHILS RELATIVE PERCENT: 89.4 %
PDW BLD-RTO: 13.7 % (ref 12.4–15.4)
PLATELET # BLD: 178 K/UL (ref 135–450)
PMV BLD AUTO: 8.9 FL (ref 5–10.5)
POTASSIUM REFLEX MAGNESIUM: 4.6 MMOL/L (ref 3.5–5.1)
RBC # BLD: 4.37 M/UL (ref 4.2–5.9)
SODIUM BLD-SCNC: 137 MMOL/L (ref 136–145)
URINE CULTURE, ROUTINE: NORMAL
WBC # BLD: 11.7 K/UL (ref 4–11)

## 2023-02-02 PROCEDURE — 6360000002 HC RX W HCPCS: Performed by: NURSE PRACTITIONER

## 2023-02-02 PROCEDURE — 9990000010 HC NO CHARGE VISIT

## 2023-02-02 PROCEDURE — 80048 BASIC METABOLIC PNL TOTAL CA: CPT

## 2023-02-02 PROCEDURE — 36415 COLL VENOUS BLD VENIPUNCTURE: CPT

## 2023-02-02 PROCEDURE — 1200000000 HC SEMI PRIVATE

## 2023-02-02 PROCEDURE — 2580000003 HC RX 258: Performed by: INTERNAL MEDICINE

## 2023-02-02 PROCEDURE — 2580000003 HC RX 258: Performed by: NURSE PRACTITIONER

## 2023-02-02 PROCEDURE — 94760 N-INVAS EAR/PLS OXIMETRY 1: CPT

## 2023-02-02 PROCEDURE — 6370000000 HC RX 637 (ALT 250 FOR IP): Performed by: NURSE PRACTITIONER

## 2023-02-02 PROCEDURE — 85025 COMPLETE CBC W/AUTO DIFF WBC: CPT

## 2023-02-02 PROCEDURE — 6370000000 HC RX 637 (ALT 250 FOR IP): Performed by: INTERNAL MEDICINE

## 2023-02-02 PROCEDURE — 6360000002 HC RX W HCPCS: Performed by: INTERNAL MEDICINE

## 2023-02-02 RX ORDER — METHOCARBAMOL 750 MG/1
750 TABLET, FILM COATED ORAL 3 TIMES DAILY PRN
Status: DISCONTINUED | OUTPATIENT
Start: 2023-02-02 | End: 2023-02-03 | Stop reason: HOSPADM

## 2023-02-02 RX ORDER — MORPHINE SULFATE 4 MG/ML
4 INJECTION, SOLUTION INTRAMUSCULAR; INTRAVENOUS ONCE
Status: COMPLETED | OUTPATIENT
Start: 2023-02-02 | End: 2023-02-02

## 2023-02-02 RX ORDER — KETOROLAC TROMETHAMINE 15 MG/ML
15 INJECTION, SOLUTION INTRAMUSCULAR; INTRAVENOUS EVERY 6 HOURS PRN
Status: COMPLETED | OUTPATIENT
Start: 2023-02-02 | End: 2023-02-03

## 2023-02-02 RX ORDER — OXYCODONE HYDROCHLORIDE 5 MG/1
5 TABLET ORAL EVERY 4 HOURS PRN
Status: DISCONTINUED | OUTPATIENT
Start: 2023-02-02 | End: 2023-02-03 | Stop reason: HOSPADM

## 2023-02-02 RX ORDER — LACTOBACILLUS RHAMNOSUS GG 10B CELL
1 CAPSULE ORAL
Status: DISCONTINUED | OUTPATIENT
Start: 2023-02-03 | End: 2023-02-03 | Stop reason: HOSPADM

## 2023-02-02 RX ORDER — TAMSULOSIN HYDROCHLORIDE 0.4 MG/1
0.4 CAPSULE ORAL DAILY
Status: DISCONTINUED | OUTPATIENT
Start: 2023-02-02 | End: 2023-02-03 | Stop reason: HOSPADM

## 2023-02-02 RX ORDER — CIPROFLOXACIN 2 MG/ML
400 INJECTION, SOLUTION INTRAVENOUS EVERY 12 HOURS
Status: DISCONTINUED | OUTPATIENT
Start: 2023-02-02 | End: 2023-02-02

## 2023-02-02 RX ORDER — PHENAZOPYRIDINE HYDROCHLORIDE 200 MG/1
200 TABLET, FILM COATED ORAL
Status: DISCONTINUED | OUTPATIENT
Start: 2023-02-02 | End: 2023-02-03 | Stop reason: HOSPADM

## 2023-02-02 RX ADMIN — TAMSULOSIN HYDROCHLORIDE 0.4 MG: 0.4 CAPSULE ORAL at 10:29

## 2023-02-02 RX ADMIN — SODIUM CHLORIDE, PRESERVATIVE FREE 10 ML: 5 INJECTION INTRAVENOUS at 20:57

## 2023-02-02 RX ADMIN — KETOROLAC TROMETHAMINE 15 MG: 15 INJECTION, SOLUTION INTRAMUSCULAR; INTRAVENOUS at 23:34

## 2023-02-02 RX ADMIN — PHENAZOPYRIDINE HYDROCHLORIDE 200 MG: 200 TABLET ORAL at 10:29

## 2023-02-02 RX ADMIN — SODIUM CHLORIDE: 9 INJECTION, SOLUTION INTRAVENOUS at 10:28

## 2023-02-02 RX ADMIN — MORPHINE SULFATE 4 MG: 4 INJECTION, SOLUTION INTRAMUSCULAR; INTRAVENOUS at 02:51

## 2023-02-02 RX ADMIN — PHENAZOPYRIDINE HYDROCHLORIDE 200 MG: 200 TABLET ORAL at 16:55

## 2023-02-02 RX ADMIN — SODIUM CHLORIDE: 9 INJECTION, SOLUTION INTRAVENOUS at 20:55

## 2023-02-02 RX ADMIN — KETOROLAC TROMETHAMINE 15 MG: 15 INJECTION, SOLUTION INTRAMUSCULAR; INTRAVENOUS at 07:57

## 2023-02-02 RX ADMIN — ENOXAPARIN SODIUM 40 MG: 100 INJECTION SUBCUTANEOUS at 20:54

## 2023-02-02 RX ADMIN — CEFTRIAXONE 1000 MG: 1 INJECTION, POWDER, FOR SOLUTION INTRAMUSCULAR; INTRAVENOUS at 15:05

## 2023-02-02 RX ADMIN — ACETAMINOPHEN 650 MG: 325 TABLET ORAL at 07:58

## 2023-02-02 RX ADMIN — SODIUM CHLORIDE, PRESERVATIVE FREE 10 ML: 5 INJECTION INTRAVENOUS at 08:05

## 2023-02-02 ASSESSMENT — PAIN - FUNCTIONAL ASSESSMENT
PAIN_FUNCTIONAL_ASSESSMENT: ACTIVITIES ARE NOT PREVENTED
PAIN_FUNCTIONAL_ASSESSMENT: ACTIVITIES ARE NOT PREVENTED

## 2023-02-02 ASSESSMENT — PAIN DESCRIPTION - ORIENTATION
ORIENTATION: RIGHT
ORIENTATION: RIGHT

## 2023-02-02 ASSESSMENT — PAIN SCALES - GENERAL
PAINLEVEL_OUTOF10: 0
PAINLEVEL_OUTOF10: 4
PAINLEVEL_OUTOF10: 8
PAINLEVEL_OUTOF10: 8
PAINLEVEL_OUTOF10: 3

## 2023-02-02 ASSESSMENT — PAIN DESCRIPTION - LOCATION
LOCATION: ABDOMEN
LOCATION: FLANK
LOCATION: FLANK

## 2023-02-02 ASSESSMENT — PAIN DESCRIPTION - DESCRIPTORS
DESCRIPTORS: THROBBING
DESCRIPTORS: DISCOMFORT

## 2023-02-02 ASSESSMENT — PAIN DESCRIPTION - ONSET: ONSET: ON-GOING

## 2023-02-02 ASSESSMENT — PAIN DESCRIPTION - FREQUENCY: FREQUENCY: INTERMITTENT

## 2023-02-02 ASSESSMENT — PAIN DESCRIPTION - PAIN TYPE: TYPE: ACUTE PAIN

## 2023-02-02 ASSESSMENT — PAIN SCALES - WONG BAKER: WONGBAKER_NUMERICALRESPONSE: 0

## 2023-02-02 NOTE — PROGRESS NOTES
Pt alert, c/o right flank pain rating 5/10. Tylenol not effective.  Secure message sent to [facility].

## 2023-02-02 NOTE — PROGRESS NOTES
Pt ambulated independently once around unit and tolerated well. No distress or discomfort noted at this time. Pt currently in bed resting, call light in reach.

## 2023-02-02 NOTE — PROGRESS NOTES
Pt Name: Letty Becerra  Medical Record Number: [id number]  Date of Birth 1965   Today's Date: 2/2/2023      Subjective:  Awake in bed, c/o pain with movement and especially when he urinates that it \"feels like fire all the way up to my kidney\" explained to him these are the expected side effects of the stent. Also having [address] which is expected. ROS: Constitutional: No fever    Vitals:  Vitals:    02/02/23 0321 02/02/23 0603 02/02/23 0613 02/02/23 0822   BP:  105/66  119/75   Pulse:  82  87   Resp: 16 18  16   Temp:  97.3 °F (36.3 °C)  98.2 °F (36.8 °C)   TempSrc:  Oral  Oral   SpO2:  93%  93%   Weight:   196 lb 6.9 oz (89.1 kg)    Height:         I/O last 3 completed shifts:   In: 700 [I.V.:700]  Out: -     Exam:  General: Awake, oriented, no acute distress  Respiratory: Nonlabored breathing  Abdomen: Soft, non-tender, non-distended, no masses  : spontaneously voiding  Skin: Skin color, texture, turgor normal, no rashes or lesions  Neurologic: no gross deficits    CURRENT MEDICATIONS   Scheduled Meds:   tamsulosin  0.4 mg Oral Daily    phenazopyridine  200 mg Oral TID WC    cefTRIAXone (ROCEPHIN) IV  1,000 mg IntraVENous Q24H    [START ON 2/3/2023] lactobacillus  1 capsule Oral Daily with breakfast    sodium chloride flush  10 mL IntraVENous 2 times per day    enoxaparin  40 mg SubCUTAneous Q24H     Continuous Infusions:   sodium chloride      sodium chloride 75 mL/hr at 02/02/23 1028     PRN Meds:.ketorolac, oxyCODONE, methocarbamol, sodium chloride flush, sodium chloride, potassium chloride **OR** potassium alternative oral replacement **OR** potassium chloride, potassium chloride, magnesium sulfate, promethazine **OR** ondansetron, magnesium hydroxide, acetaminophen **OR** acetaminophen    LABS     Recent Labs     02/01/23  1216 02/02/23  0611   WBC 9.7 11.7*   HGB 13.9 12.6*   HCT 42.4 39.4*    178   * 137   K 4.8 4.6   CL 96* 106   CO2 23 21   BUN 16 14   CREATININE 1.6* 1.0   CALCIUM 9.3 8.6   AST 28  --    ALT 20  --    BILITOT 1.0  --      Blood and urine cultures pending      [address] day # 1  57y. o. male with a 3mm distal right ureteral stone with hydro, DIANE, fever. S/p right ureteral stent insertion with Dr. Noel Richards on 2/1/23  DIANE resolved s/p stent placement  C/o dysuria, frequency  PLAN   Pyridium and Flomax added for stent side effects  Continue Rocephin and await cultures. Ureteroscopy in 1-2 weeks, our office will contact him to arrange this.     Savana Bro, TIFFANIE - CNP 2/2/2023

## 2023-02-02 NOTE — ANESTHESIA POSTPROCEDURE EVALUATION
[facility] Department of Anesthesiology  Post-Anesthesia Note       Name:  Thuan Brown                                  Age:  57 y.o.  MRN:  [id number]     Last Vitals & Oxygen Saturation: /63   Pulse 88   Temp 98 °F (36.7 °C) (Oral)   Resp 16   Ht 5' 8\" (1.727 m)   Wt 193 lb 5.5 oz (87.7 kg)   SpO2 95%   BMI 29.40 kg/m²   Patient Vitals for the past 4 hrs:   BP Temp Temp src Pulse Resp SpO2   02/01/23 1813 -- -- -- -- -- 95 %   02/01/23 1807 106/63 98 °F (36.7 °C) Oral 88 16 96 %   02/01/23 1745 (!) 97/57 -- -- 87 15 96 %   02/01/23 1743 97/60 -- -- 98 14 97 %   02/01/23 1740 (!) 97/56 -- -- 96 17 95 %   02/01/23 1735 (!) 87/59 -- -- 99 16 97 %   02/01/23 1730 (!) 92/53 -- -- 85 16 97 %   02/01/23 1729 (!) 92/53 -- -- 95 16 97 %   02/01/23 1725 (!) 89/50 -- -- 87 17 97 %   02/01/23 1720 (!) 79/43 -- -- 85 15 96 %   02/01/23 1718 (!) 76/43 97.8 °F (36.6 °C) -- 85 16 95 %   02/01/23 1636 103/66 98.4 °F (36.9 °C) Temporal 83 16 97 %   02/01/23 1600 102/60 -- -- 76 15 98 %   02/01/23 1520 99/62 98.8 °F (37.1 °C) Oral 85 15 --       Level of consciousness:  Awake, alert    Respiratory: Respirations easy, no distress. Stable.    Cardiovascular: Hemodynamically stable.    Hydration: Adequate.    PONV: Adequately managed.    Post-op pain: Adequately controlled.    Post-op assessment: Tolerated anesthetic well without complication.    Complications:  None.    Dayton Prabhakar MD  February 1, 2023   7:00 PM

## 2023-02-02 NOTE — PROGRESS NOTES
Occupational Therapy Attempt/Discharge  Wendall Eleanor    OT order noted. Pt here for kidney stones. He is UAL in the room, no therapy needs. Will cancel orders.     Electronically signed by Moss Oppenheim, OT on 2/2/23 at 7:27 AM EST

## 2023-02-02 NOTE — PROGRESS NOTES
Physical Therapy  Initial Evaluation Attempt and Discharge    23    Name: Jeanne Gilmore   : 1965    MRN: [id number]    PT order noted. Patient moving independently in room. No therapy needs at this time. PT signing off.     Electronically signed by Loman Cogan, PT on 2023 at 7:27 AM

## 2023-02-02 NOTE — PLAN OF CARE
Problem: Discharge Planning  Goal: Discharge to home or other facility with appropriate resources  2/1/2023 2355 by Jovany Perkins RN  Outcome: Progressing  2/1/2023 1827 by Anyi Fernandez RN  Outcome: Progressing     Problem: Pain  Goal: Verbalizes/displays adequate comfort level or baseline comfort level  2/1/2023 2355 by Jovany Perkins RN  Outcome: Progressing  2/1/2023 1827 by Anyi Fernandez RN  Outcome: Progressing     Problem: Safety - Adult  Goal: Free from fall injury  Outcome: Progressing     Problem: Genitourinary - Adult  Goal: Absence of urinary retention  Outcome: Progressing

## 2023-02-02 NOTE — PROGRESS NOTES
Hospital Medicine Progress Note      Admit Date: 2/1/2023       CC: F/U for flank pain     HPI: Patient is a 61-year-old male with past medical history of kidney stones, asthma, hypertension who presents to the hospital for right-sided flank pain. According patient he just started yesterday, got worse, initially it was a back pain and then it moved and felt like right-sided flank pain. He denies associated hematuria frequency urination but endorses he had fever. He says his last episode of kidney stones was around 7 years ago. Denied chest pain shortness of breath nausea vomiting. Interval History/Subjective: spiked fever and elevated lactate when he came back to hospital for pain that had moved from flank to abdomen. Urology saw patient and recommend keeping overnight. Will cont IVF and antibx and treat pain. D/c likely tomorrow. States he had a 5mm in the past that he guesses he passed on his own ultimately. Review of Systems:       The patient denied headaches, visual changes, LOC, SOB, CP, ABD pain, N/V/D, skin changes, new or worsening weakness or neuromuscular deficits. Comprehensive ROS negative except as mentioned above.     Past Medical History:        Diagnosis Date    Arthritis     Asthma     as a child--no problems in past 25 years    High blood pressure     Kidney stone        Past Surgical History:        Procedure Laterality Date    BACK SURGERY  2010    CERVICAL DISC SURGERY per Dr. Dr. Talmadge Sandhoff Right 2/1/2023    CYSTOSCOPY RETROGRADE PYELOGRAM RIGHT STENT INSERTION performed by Ely Frankel MD at [address]  02/06/2017    cysto, attempted ureteroscopy, left retrograde pyelogram    HERNIA REPAIR Right     INGUINAL HERNIA REPAIR Left 4.28.11    JOINT REPLACEMENT Right 12-    knee    KNEE ARTHROSCOPY      2004(pt unsure of which knee) 2012-both knees scoped    TOTAL HIP ARTHROPLASTY Right 11/4/2020    RIGHT ANTERIOR TOTAL HIP REPLACEMENT WITH C-ARM performed by Venecia Doss MD at [address] [facility] Left 02/07/2017    DR. Deonna Alanis - [id number]       Allergies:  Seasonal    Past medical and surgical history reviewed. Any changes have been noted. PHYSICAL EXAM:  /75   Pulse 87   Temp 98.2 °F (36.8 °C) (Oral)   Resp 16   Ht 5' 8\" (1.727 m)   Wt 196 lb 6.9 oz (89.1 kg)   SpO2 93%   BMI 29.87 kg/m²       Intake/Output Summary (Last 24 hours) at 2/2/2023 1147  Last data filed at 2/1/2023 1713  Gross per 24 hour   Intake 700 ml   Output --   Net 700 ml        General appearance:   No apparent distress, appears stated age. Cooperative. HEENT:  Normocephalic, atraumatic. PERRLA. EOMi. Conjunctivae/corneas clear, no icterus, non-injected. Neck: Supple, with full range of motion. No jugular venous distention. Trachea midline. Respiratory:  Normal respiratory effort. Clear to auscultation, bilaterally without Rales/Wheezes/Rhonchi. Cardiovascular:  Regular rate and rhythm without murmurs, rubs or gallops. Abdomen: Soft, non-tender, non-distended, without rebound or guarding. Normal bowel sounds. Musculoskeletal:  No clubbing, cyanosis or edema bilaterally. Full range of motion without deformity. Skin: Skin color, texture, turgor normal.  No rashes or lesions. Neurologic:  Neurovascularly intact without any focal sensory/motor deficits. Cranial nerves: II-XII intact, grossly intact. No facial asymmetry, tongue midline.    Psychiatric:  Alert and oriented, thought content appropriate  Capillary Refill: Brisk,< 3 seconds   Peripheral Pulses: +2 palpable, equal bilaterally       LABS:    Lab Results   Component Value Date    WBC 11.7 (H) 02/02/2023    HGB 12.6 (L) 02/02/2023    HCT 39.4 (L) 02/02/2023    MCV 90.2 02/02/2023     02/02/2023    LYMPHOPCT 3.9 02/02/2023    RBC 4.37 02/02/2023    MCH 28.9 02/02/2023    MCHC 32.0 02/02/2023    RDW 13.7 02/02/2023       Lab Results   Component Value Date    CREATININE 1.0 02/02/2023    BUN 14 02/02/2023     02/02/2023    K 4.6 02/02/2023     02/02/2023    CO2 21 02/02/2023       No results found for: MG    Lab Results   Component Value Date    ALT 20 02/01/2023    AST 28 02/01/2023    ALKPHOS 62 02/01/2023    BILITOT 1.0 02/01/2023        No flowsheet data found. Lab Results   Component Value Date    LABA1C 5.9 01/18/2023       Imaging:  No orders to display       Scheduled and prn Medications:    Scheduled Meds:   tamsulosin  0.4 mg Oral Daily    phenazopyridine  200 mg Oral TID WC    cefTRIAXone (ROCEPHIN) IV  1,000 mg IntraVENous Q24H    sodium chloride flush  10 mL IntraVENous 2 times per day    enoxaparin  40 mg SubCUTAneous Q24H     Continuous Infusions:   sodium chloride      sodium chloride 75 mL/hr at 02/02/23 1028     PRN Meds:.ketorolac, sodium chloride flush, sodium chloride, potassium chloride **OR** potassium alternative oral replacement **OR** potassium chloride, potassium chloride, magnesium sulfate, promethazine **OR** ondansetron, magnesium hydroxide, acetaminophen **OR** acetaminophen    Assessment & Plan:        Right-sided flank pain likely secondary to nephrolithiasis (3mm right ureteral stone causing moderate obstruction)   Fevers-unclear etiology at this time  Acute kidney injury  Lactic acidosis  Sepsis present on admission likely source urinary     Plan  Start IV Rocephin  Check blood cultures, urine culture  Monitor and replace electrolytes  IV fluids  Pain control  Urology was consulted from ED, patient is status post cystoscopy, await for the results  Resume home medications  - antibx ppx . U/a neg for UTI, but had manipulation yesterday with procedure  - robaxin and oxy PRN for pain control. Continue current regimen/therapies. Monitor. Adjust medical regimen as appropriate. Body mass index is 29.87 kg/m².     The patient and / or the family were informed of the results of any tests, a time was given to answer questions, a plan was proposed and they agreed with plan. DVT ppx: lovenox       Diet: ADULT DIET;  Regular    Consults:  IP CONSULT TO UROLOGY  IP CONSULT TO HOSPITALIST    DISPO/placement plan: pending     Code Status: Full Code      TIFFANIE Pichardo - NORAH  02/02/23

## 2023-02-02 NOTE — CARE COORDINATION
Case Management Assessment  Initial Evaluation    Date/Time of Evaluation: 2/2/2023 2:31 PM  Assessment Completed by: Hemal Mckeon RN    If patient is discharged prior to next notation, then this note serves as note for discharge by case management. Patient Name: Deion Bennett                   YOB: 1965  Diagnosis: Nephrolithiasis [N20.0]  Flank pain [R10.9]                   Date / Time: 2/1/2023 11:45 AM    Patient Admission Status: Inpatient   Readmission Risk (Low < 19, Mod (19-27), High > 27): Readmission Risk Score: 3.6    Current PCP: Rosario King MD  PCP verified by CM? Yes    Chart Reviewed: Yes      History Provided by: Patient  Patient Orientation: Alert and Oriented, Person, Place, Situation, Self    Patient Cognition: Alert    Hospitalization in the last 30 days (Readmission):  No    If yes, Readmission Assessment in  Navigator will be completed. Advance Directives:      Code Status: Full Code   Patient's Primary Decision Maker is: Legal Next of Kin      Discharge Planning:    Patient lives with: Spouse/Significant Other Type of Home: House  Primary Care Giver:    Patient Support Systems include: Spouse/Significant Other   Current Financial resources:    Current community resources:    Current services prior to admission: None            Current DME:              Type of Home Care services:  None    ADLS  Prior functional level: Independent in ADLs/IADLs  Current functional level: Independent in ADLs/IADLs    PT AM-PAC:   /24  OT AM-PAC:   /24    Family can provide assistance at DC: Yes  Would you like Case Management to discuss the discharge plan with any other family members/significant others, and if so, who?  Yes (wife)  Plans to Return to Present Housing: Yes  Other Identified Issues/Barriers to RETURNING to current housing: na  Potential Assistance needed at discharge: N/A            Potential DME:    Patient expects to discharge to: [address] [place] [address] for transportation at discharge:      Financial    Payor: Radha Aj / Plan: Aurelia / Product Type: *No Product type* /     Does insurance require precert for SNF: na    Potential assistance Purchasing Medications: No  Meds-to-Beds request:        [facility] [id number] - [place], [address] [place] [address] [place]. Luis Angel Tidwell [phone] - F [phone]  [address] [place] [address].   [place] [address]  Phone: [phone] Fax: [phone]      Notes:    Factors facilitating achievement of predicted outcomes: Family support, Caregiver support, Cooperative, Pleasant, Sense of humor, and Has needed Durable Medical Equipment at home    Barriers to discharge: na    Additional Case Management Notes: plans to dc home, will have a ride, denies needs    The Plan for Transition of Care is related to the following treatment goals of Nephrolithiasis [N20.0]  Flank pain [R10.9]    Sagar Bunn RN, BSN,   [phone]  Electronically signed by Sagar Bunn RN on 2/2/2023 at 2:32 PM

## 2023-02-03 VITALS
OXYGEN SATURATION: 95 % | DIASTOLIC BLOOD PRESSURE: 78 MMHG | HEART RATE: 85 BPM | SYSTOLIC BLOOD PRESSURE: 136 MMHG | HEIGHT: 68 IN | TEMPERATURE: 98.8 F | BODY MASS INDEX: 29.74 KG/M2 | RESPIRATION RATE: 16 BRPM | WEIGHT: 196.21 LBS

## 2023-02-03 LAB
ANION GAP SERPL CALCULATED.3IONS-SCNC: 7 MMOL/L (ref 3–16)
BASOPHILS ABSOLUTE: 0 K/UL (ref 0–0.2)
BASOPHILS RELATIVE PERCENT: 0.4 %
BUN BLDV-MCNC: 15 MG/DL (ref 7–20)
CALCIUM SERPL-MCNC: 8.4 MG/DL (ref 8.3–10.6)
CHLORIDE BLD-SCNC: 107 MMOL/L (ref 99–110)
CO2: 22 MMOL/L (ref 21–32)
CREAT SERPL-MCNC: 0.9 MG/DL (ref 0.9–1.3)
EOSINOPHILS ABSOLUTE: 0.1 K/UL (ref 0–0.6)
EOSINOPHILS RELATIVE PERCENT: 1.2 %
GFR SERPL CREATININE-BSD FRML MDRD: >60 ML/MIN/{1.73_M2}
GLUCOSE BLD-MCNC: 107 MG/DL (ref 70–99)
HCT VFR BLD CALC: 34.1 % (ref 40.5–52.5)
HEMOGLOBIN: 11.3 G/DL (ref 13.5–17.5)
LYMPHOCYTES ABSOLUTE: 1.3 K/UL (ref 1–5.1)
LYMPHOCYTES RELATIVE PERCENT: 17.1 %
MCH RBC QN AUTO: 29.6 PG (ref 26–34)
MCHC RBC AUTO-ENTMCNC: 33.3 G/DL (ref 31–36)
MCV RBC AUTO: 89 FL (ref 80–100)
MONOCYTES ABSOLUTE: 0.6 K/UL (ref 0–1.3)
MONOCYTES RELATIVE PERCENT: 7.5 %
NEUTROPHILS ABSOLUTE: 5.6 K/UL (ref 1.7–7.7)
NEUTROPHILS RELATIVE PERCENT: 73.8 %
PDW BLD-RTO: 13.7 % (ref 12.4–15.4)
PLATELET # BLD: 151 K/UL (ref 135–450)
PMV BLD AUTO: 9.1 FL (ref 5–10.5)
POTASSIUM REFLEX MAGNESIUM: 4.4 MMOL/L (ref 3.5–5.1)
RBC # BLD: 3.83 M/UL (ref 4.2–5.9)
SODIUM BLD-SCNC: 136 MMOL/L (ref 136–145)
WBC # BLD: 7.6 K/UL (ref 4–11)

## 2023-02-03 PROCEDURE — 2580000003 HC RX 258: Performed by: INTERNAL MEDICINE

## 2023-02-03 PROCEDURE — 85025 COMPLETE CBC W/AUTO DIFF WBC: CPT

## 2023-02-03 PROCEDURE — 36415 COLL VENOUS BLD VENIPUNCTURE: CPT

## 2023-02-03 PROCEDURE — 6370000000 HC RX 637 (ALT 250 FOR IP): Performed by: NURSE PRACTITIONER

## 2023-02-03 PROCEDURE — 6360000002 HC RX W HCPCS: Performed by: NURSE PRACTITIONER

## 2023-02-03 PROCEDURE — 80048 BASIC METABOLIC PNL TOTAL CA: CPT

## 2023-02-03 PROCEDURE — 94760 N-INVAS EAR/PLS OXIMETRY 1: CPT

## 2023-02-03 RX ORDER — LACTOBACILLUS RHAMNOSUS GG 10B CELL
1 CAPSULE ORAL
Qty: 30 CAPSULE | Refills: 0 | Status: SHIPPED | OUTPATIENT
Start: 2023-02-04

## 2023-02-03 RX ORDER — METHOCARBAMOL 750 MG/1
750 TABLET, FILM COATED ORAL 3 TIMES DAILY PRN
Qty: 12 TABLET | Refills: 0 | Status: SHIPPED | OUTPATIENT
Start: 2023-02-03 | End: 2023-02-07

## 2023-02-03 RX ORDER — CIPROFLOXACIN 500 MG/1
500 TABLET, FILM COATED ORAL 2 TIMES DAILY
Qty: 12 TABLET | Refills: 0 | Status: SHIPPED | OUTPATIENT
Start: 2023-02-03 | End: 2023-02-09

## 2023-02-03 RX ORDER — OXYCODONE HYDROCHLORIDE 5 MG/1
5 TABLET ORAL EVERY 4 HOURS PRN
Qty: 12 TABLET | Refills: 0 | Status: SHIPPED | OUTPATIENT
Start: 2023-02-03 | End: 2023-02-06

## 2023-02-03 RX ORDER — PHENAZOPYRIDINE HYDROCHLORIDE 200 MG/1
200 TABLET, FILM COATED ORAL
Qty: 9 TABLET | Refills: 0 | Status: SHIPPED | OUTPATIENT
Start: 2023-02-03 | End: 2023-02-06

## 2023-02-03 RX ADMIN — PHENAZOPYRIDINE HYDROCHLORIDE 200 MG: 200 TABLET ORAL at 10:51

## 2023-02-03 RX ADMIN — TAMSULOSIN HYDROCHLORIDE 0.4 MG: 0.4 CAPSULE ORAL at 09:08

## 2023-02-03 RX ADMIN — SODIUM CHLORIDE, PRESERVATIVE FREE 10 ML: 5 INJECTION INTRAVENOUS at 09:08

## 2023-02-03 RX ADMIN — Medication 1 CAPSULE: at 09:08

## 2023-02-03 RX ADMIN — PHENAZOPYRIDINE HYDROCHLORIDE 200 MG: 200 TABLET ORAL at 09:08

## 2023-02-03 RX ADMIN — KETOROLAC TROMETHAMINE 15 MG: 15 INJECTION, SOLUTION INTRAMUSCULAR; INTRAVENOUS at 10:45

## 2023-02-03 NOTE — PLAN OF CARE
Problem: Discharge Planning  Goal: Discharge to home or other facility with appropriate resources  2/3/2023 0801 by Dominga Grant RN  Outcome: Progressing  2/2/2023 2325 by Tex Bunn RN  Outcome: Progressing  Flowsheets (Taken 2/2/2023 2054)  Discharge to home or other facility with appropriate resources: Identify barriers to discharge with patient and caregiver     Problem: Pain  Goal: Verbalizes/displays adequate comfort level or baseline comfort level  2/3/2023 0801 by Dominga Grant RN  Outcome: Progressing  2/2/2023 2325 by Tex Bunn RN  Outcome: Progressing     Problem: Safety - Adult  Goal: Free from fall injury  2/3/2023 0801 by Dominga Grant RN  Outcome: Progressing  2/2/2023 2325 by Tex Bunn RN  Outcome: Progressing     Problem: Genitourinary - Adult  Goal: Absence of urinary retention  2/3/2023 0801 by Dominga Grant RN  Outcome: Progressing  2/2/2023 2325 by Tex Bunn RN  Outcome: Progressing     Problem: ABCDS Injury Assessment  Goal: Absence of physical injury  2/3/2023 0801 by Dominga Grant RN  Outcome: Progressing  2/2/2023 2325 by Tex Bunn RN  Outcome: Progressing

## 2023-02-03 NOTE — PROGRESS NOTES
Pt ambulated independently with all personal belongings. No distress or discomfort noted upon discharge.

## 2023-02-03 NOTE — PLAN OF CARE
Problem: Discharge Planning  Goal: Discharge to home or other facility with appropriate resources  2/2/2023 2325 by Tex Bunn RN  Outcome: Progressing  Flowsheets (Taken 2/2/2023 2054)  Discharge to home or other facility with appropriate resources: Identify barriers to discharge with patient and caregiver  2/2/2023 1238 by Dominga Grant RN  Outcome: Progressing  Flowsheets  Taken 2/2/2023 0830 by Dominga Grant RN  Discharge to home or other facility with appropriate resources:   Identify barriers to discharge with patient and caregiver   Arrange for needed discharge resources and transportation as appropriate   Identify discharge learning needs (meds, wound care, etc)  Taken 2/2/2023 0201 by Tex Bunn RN  Discharge to home or other facility with appropriate resources: Identify barriers to discharge with patient and caregiver     Problem: Pain  Goal: Verbalizes/displays adequate comfort level or baseline comfort level  2/2/2023 2325 by Tex Bunn RN  Outcome: Progressing  2/2/2023 1238 by Dominga Grant RN  Outcome: Progressing     Problem: Safety - Adult  Goal: Free from fall injury  2/2/2023 2325 by Tex Bunn RN  Outcome: Progressing  2/2/2023 1238 by Dominga Grant RN  Outcome: Progressing     Problem: Genitourinary - Adult  Goal: Absence of urinary retention  2/2/2023 2325 by Tex Bunn RN  Outcome: Progressing  2/2/2023 1238 by Dominga Grant RN  Outcome: Progressing  Flowsheets (Taken 2/2/2023 0830)  Absence of urinary retention:   Assess patients ability to void and empty bladder   Monitor intake/output and perform bladder scan as needed     Problem: ABCDS Injury Assessment  Goal: Absence of physical injury  2/2/2023 2325 by Tex Bunn RN  Outcome: Progressing  2/2/2023 1238 by Dominga Grant RN  Outcome: Progressing

## 2023-02-03 NOTE — DISCHARGE SUMMARY
Hospital Medicine Discharge Summary    Patient ID: Sabrina Pod      Patient's PCP: Tommy Marina MD    Admit Date: 2/1/2023     Discharge Date: 2/3/2023      Admitting Physician: Marta Prieto MD     Discharge Physician: TIFFANIE Calabrese - CNP       Discharge Diagnoses: Active Hospital Problems    Diagnosis Date Noted    Flank pain [R10.9] 02/01/2023     Priority: Medium       The patient was seen and examined on day of discharge and this discharge summary is in conjunction with any daily progress note from day of discharge. Disposition:  [x] Home  [] Home with home health [] Rehab [] Psych [] SNF  [] LTAC  [] Long term nursing home or group home [] Transfer to ICU  [] Transfer to PCU [] Other:    Hospital Course:  Patient is a 21-year-old male with past medical history of kidney stones, asthma, hypertension who presents to the hospital for right-sided flank pain. According patient he just started yesterday, got worse, initially it was a back pain and then it moved and felt like right-sided flank pain. He denies associated hematuria frequency urination but endorses he had fever. He says his last episode of kidney stones was around 7 years ago. Denied chest pain shortness of breath nausea vomiting. 2/2: spiked fever and elevated lactate when he came back to hospital for pain that had moved from flank to abdomen. Urology saw patient and recommend keeping overnight. Will cont IVF and antibx and treat pain. D/c likely tomorrow. States he had a 5mm in the past that he guesses he passed on his own ultimately. Assessment/plan:  Right-sided flank pain likely secondary to nephrolithiasis (3mm right ureteral stone causing moderate obstruction)   Fevers-unclear etiology at this time  Acute kidney injury  Lactic acidosis  Sepsis present on admission likely source urinary     Plan  Start IV Rocephin- change to po cipro on d/c x7 days + pyridium for pain.  Robaxin PRN   - flomax. Check blood cultures, urine culture  Monitor and replace electrolytes  IV fluids  Pain control  Urology was consulted from ED, patient is status post cystoscopy, await for the results  Resume home medications  - antibx ppx . U/a neg for UTI, but had manipulation yesterday with procedure  - robaxin and oxy PRN for pain control. Exam:     /78   Pulse 85   Temp 98.8 °F (37.1 °C) (Oral)   Resp 16   Ht 5' 8\" (1.727 m)   Wt 196 lb 3.4 oz (89 kg)   SpO2 95%   BMI 29.83 kg/m²   General appearance: No apparent distress, appears stated age and cooperative. HEENT: Pupils equal, round, and reactive to light. Conjunctivae/corneas clear. Neck: Supple, with full range of motion. No jugular venous distention. Trachea midline. Respiratory:  Normal respiratory effort. Clear to auscultation, bilaterally without Rales/Wheezes/Rhonchi. Cardiovascular: Regular rate and rhythm with normal S1/S2 without murmurs, rubs or gallops. Abdomen: Soft, non-tender, non-distended with normal bowel sounds. Musculoskelatal: No clubbing, cyanosis or edema bilaterally. Full range of motion without deformity. Skin: Skin color, texture, turgor normal.  No rashes or lesions. Neurologic:  Neurovascularly intact without any focal sensory/motor deficits. Cranial nerves: II-XII intact, grossly non-focal.  Psychiatric: Alert and oriented, thought content appropriate, normal insight      Consults:     IP CONSULT TO UROLOGY  IP CONSULT TO HOSPITALIST    Diagnostic tests: n/a        Labs:  For convenience and continuity at follow-up the following most recent labs are provided:      CBC:    Lab Results   Component Value Date/Time    WBC 7.6 02/03/2023 07:00 AM    HGB 11.3 02/03/2023 07:00 AM    HCT 34.1 02/03/2023 07:00 AM     02/03/2023 07:00 AM       Renal:    Lab Results   Component Value Date/Time     02/03/2023 06:59 AM    K 4.4 02/03/2023 06:59 AM     02/03/2023 06:59 AM    CO2 22 02/03/2023 06:59 AM    BUN 15 02/03/2023 06:59 AM    CREATININE 0.9 02/03/2023 06:59 AM    CALCIUM 8.4 02/03/2023 06:59 AM    PHOS 3.3 12/21/2021 08:52 AM           Discharge Instructions/Follow-up:  cipro bid x7 days. F/u with urology as scheduled for stent removal   Pain meds Rx and robaxin given   Cont flomax and pyridium     PCP/SNF to follow up: pcp 1 wk     D/C condition: stable     Code status: full        Discharge Medications:     Discharge Medication List as of 2/3/2023 11:30 AM             Details   oxyCODONE (ROXICODONE) 5 MG immediate release tablet Take 1 tablet by mouth every 4 hours as needed for Pain for up to 3 days. Max Daily Amount: 30 mg, Disp-12 tablet, R-0Print      lactobacillus (CULTURELLE) capsule Take 1 capsule by mouth daily (with breakfast), Disp-30 capsule, R-0Normal      phenazopyridine (PYRIDIUM) 200 MG tablet Take 1 tablet by mouth 3 times daily (with meals) for 3 days, Disp-9 tablet, R-0Normal      methocarbamol (ROBAXIN) 750 MG tablet Take 1 tablet by mouth 3 times daily as needed (back pain), Disp-12 tablet, R-0Normal      ciprofloxacin (CIPRO) 500 MG tablet Take 1 tablet by mouth 2 times daily for 6 days, Disp-12 tablet, R-0Normal                Details   oxyCODONE-acetaminophen (PERCOCET) 5-325 MG per tablet Take 1 tablet by mouth every 6 hours as needed for Pain for up to 3 days. Intended supply: 3 days.  Take lowest dose possible to manage pain Max Daily Amount: 4 tablets, Disp-12 tablet, R-0Normal      ketorolac (TORADOL) 10 MG tablet Take 1 tablet by mouth every 6 hours as needed for Pain, Disp-20 tablet, R-0Normal      tamsulosin (FLOMAX) 0.4 MG capsule Take 1 capsule by mouth daily, Disp-30 capsule, R-0Normal      ondansetron (ZOFRAN) 4 MG tablet Take 1 tablet by mouth every 8 hours as needed for Nausea, Disp-20 tablet, R-0Normal      lisinopril (PRINIVIL;ZESTRIL) 10 MG tablet TAKE ONE TABLET BY MOUTH DAILY, Disp-90 tablet, R-0Normal      DUPIXENT 300 MG/2ML SOSY injection Inject 300 mg into the skin once a week , [facility]             Time Spent on discharge is more than 30 minutes in the examination, evaluation, counseling and review of medications and discharge plan. Signed:    TIFFANIE Morris - CNP   2/3/2023      Thank you Yue Agustin MD for the opportunity to be involved in this patient's care. If you have any questions or concerns please feel free to contact me at [phone].

## 2023-02-03 NOTE — CARE COORDINATION
CASE MANAGEMENT DISCHARGE SUMMARY:    DISCHARGE DATE: 02/03/23    DISCHARGED TO HOME     TRANSPORTATION: spouse    Denied needs.     Electronically signed by Macho Lozano RN on 2/3/2023 at 11:00 AM

## 2023-02-03 NOTE — PLAN OF CARE
Problem: Discharge Planning  Goal: Discharge to home or other facility with appropriate resources  2/3/2023 1126 by Nicholas San RN  Outcome: Progressing  2/3/2023 0801 by Nicholas San RN  Outcome: Progressing  Flowsheets (Taken 2/3/2023 0800)  Discharge to home or other facility with appropriate resources:   Identify barriers to discharge with patient and caregiver   Arrange for needed discharge resources and transportation as appropriate   Identify discharge learning needs (meds, wound care, etc)  2/2/2023 2325 by Ryan Titus RN  Outcome: Progressing  Flowsheets (Taken 2/2/2023 2054)  Discharge to home or other facility with appropriate resources: Identify barriers to discharge with patient and caregiver     Problem: Pain  Goal: Verbalizes/displays adequate comfort level or baseline comfort level  2/3/2023 1126 by Nicholas San RN  Outcome: Progressing  2/3/2023 0801 by Nicholas San RN  Outcome: Progressing  2/2/2023 2325 by Ryan Titus RN  Outcome: Progressing     Problem: Safety - Adult  Goal: Free from fall injury  2/3/2023 1126 by Nicholas San RN  Outcome: Progressing  Flowsheets (Taken 2/3/2023 0804)  Free From Fall Injury:   Instruct family/caregiver on patient safety   Based on caregiver fall risk screen, instruct family/caregiver to ask for assistance with transferring infant if caregiver noted to have fall risk factors  2/3/2023 0801 by Nicholas San RN  Outcome: Progressing  2/2/2023 2325 by Ryan Titus RN  Outcome: Progressing     Problem: Genitourinary - Adult  Goal: Absence of urinary retention  2/3/2023 1126 by Nicholas San RN  Outcome: Progressing  2/3/2023 0801 by Nicholas San RN  Outcome: Progressing  Flowsheets (Taken 2/3/2023 0800)  Absence of urinary retention:   Assess patients ability to void and empty bladder   Place urinary catheter per Licensed Independent Practitioner order if needed   Monitor intake/output and perform bladder scan as needed  2/2/2023 2325 by Tiffany Vora RN  Outcome: Progressing     Problem: ABCDS Injury Assessment  Goal: Absence of physical injury  2/3/2023 1126 by Trent Coulter RN  Outcome: Progressing  Flowsheets (Taken 2/3/2023 0804)  Absence of Physical Injury: Implement safety measures based on patient assessment  2/3/2023 0801 by Tretn Coulter RN  Outcome: Progressing  2/2/2023 2325 by Tiffany Vora RN  Outcome: Progressing

## 2023-02-03 NOTE — PROGRESS NOTES
Pt Name: Gustavo Tamez  Medical Record Number: [id number]  Date of Birth 1965   Today's Date: 2/3/2023      Subjective:  Awake in bed, feeling much better today. ROS: Constitutional: No fever    Vitals:  Vitals:    02/02/23 2058 02/03/23 0453 02/03/23 0733 02/03/23 0830   BP: (!) 156/85 136/82  136/78   Pulse: 83 66  85   Resp: 18 18  16   Temp: 99.2 °F (37.3 °C) 97.9 °F (36.6 °C)  98.8 °F (37.1 °C)   TempSrc: Oral Oral  Oral   SpO2: 94% 95%  95%   Weight:   196 lb 3.4 oz (89 kg)    Height:         I/O last 3 completed shifts:   In: 240 [P.O.:240]  Out: -     Exam:  General: Awake, oriented, no acute distress  Respiratory: Nonlabored breathing  Abdomen: Soft, non-tender, non-distended, no masses  : spontaneously voiding  Skin: Skin color, texture, turgor normal, no rashes or lesions  Neurologic: no gross deficits    CURRENT MEDICATIONS   Scheduled Meds:   tamsulosin  0.4 mg Oral Daily    phenazopyridine  200 mg Oral TID WC    cefTRIAXone (ROCEPHIN) IV  1,000 mg IntraVENous Q24H    lactobacillus  1 capsule Oral Daily with breakfast    sodium chloride flush  10 mL IntraVENous 2 times per day    enoxaparin  40 mg SubCUTAneous Q24H     Continuous Infusions:   sodium chloride      sodium chloride 75 mL/hr at 02/02/23 2055     PRN Meds:.ketorolac, oxyCODONE, methocarbamol, sodium chloride flush, sodium chloride, potassium chloride **OR** potassium alternative oral replacement **OR** potassium chloride, potassium chloride, magnesium sulfate, promethazine **OR** ondansetron, magnesium hydroxide, acetaminophen **OR** acetaminophen    LABS     Recent Labs     02/01/23  1216 02/02/23  0611 02/03/23  0659 02/03/23  0700   WBC 9.7 11.7*  --  7.6   HGB 13.9 12.6*  --  11.3*   HCT 42.4 39.4*  --  34.1*    178  --  151   * 137 136  --    K 4.8 4.6 4.4  --    CL 96* 106 107  --    CO2 23 21 22  --    BUN 16 14 15  --    CREATININE 1.6* 1.0 0.9  --    CALCIUM 9.3 8.6 8.4  --    AST 28  --   --   --    ALT 20  --   --   --    BILITOT 1.0  --   --   --      Blood and urine cultures pending      [address] [facility] [address] day # 2  57y. o. male with a 3mm distal right ureteral stone with hydro, DIANE, fever.   S/p right ureteral stent insertion with Dr. Augusto Link on 2/1/23  DIANE resolved s/p stent placement  S/e dysuria and frequency improved today  PLAN   Continue Pyridium and Flomax for stent side effects, PRN pain medication  Continue antibiotic- Bactrim or Cipro BID x 7 days    Outpatient ureteroscopy already scheduled for Tuesday 2/14/23     TIFFANIE Phillips - NORAH 2/3/2023

## 2023-02-05 LAB
BLOOD CULTURE, ROUTINE: NORMAL
CULTURE, BLOOD 2: NORMAL

## 2023-02-06 ENCOUNTER — TELEPHONE (OUTPATIENT)
Dept: FAMILY MEDICINE CLINIC | Age: 58
End: 2023-02-06

## 2023-02-06 NOTE — TELEPHONE ENCOUNTER
Care Transitions Initial Follow Up Call    Outreach made within 2 business days of discharge: Yes    Patient: Judie Gamez Patient : 1965   MRN: [id number]  Reason for Admission: There are no discharge diagnoses documented for the most recent discharge. Discharge Date: 2/3/23       Spoke with: Yris Whitaker     Discharge department/facility: [facility]    TCM Interactive Patient Contact: Follow up visit not needed Per Dr Michelle Gavin, Pt states is follow up with urology Tuesday. Follow Up  No future appointments.     Yumiko Fierro LPN

## 2023-03-16 RX ORDER — LISINOPRIL 10 MG/1
TABLET ORAL
Qty: 90 TABLET | Refills: 3 | Status: SHIPPED | OUTPATIENT
Start: 2023-03-16

## 2023-03-16 NOTE — TELEPHONE ENCOUNTER
Patient is calling requesting refills for:    Medication:   Requested Prescriptions     Pending Prescriptions Disp Refills    lisinopril (PRINIVIL;ZESTRIL) 10 MG tablet 90 tablet 0     Sig: TAKE ONE TABLET BY MOUTH DAILY         Patient Phone Number: [phone] (home)     Last appt: 1/18/2023   Next appt: Visit date not found    Pharmacy:   [facility] [id number] [address]. [facility] [phone] - F [phone]  [address] [place] [address]  Phone: [phone] Fax: [phone]

## 2023-05-23 ENCOUNTER — TELEPHONE (OUTPATIENT)
Dept: FAMILY MEDICINE CLINIC | Age: 58
End: 2023-05-23

## 2023-05-23 DIAGNOSIS — M17.11 ARTHRITIS OF RIGHT KNEE: Primary | ICD-10-CM

## 2023-05-23 RX ORDER — MELOXICAM 15 MG/1
1 TABLET ORAL DAILY PRN
COMMUNITY
Start: 2023-04-18 | End: 2023-05-24 | Stop reason: SDUPTHER

## 2023-05-23 NOTE — TELEPHONE ENCOUNTER
Has knee issues. Has been seen by ortho at [facility]. Had been Rx. maloxican 50 mg. Wanting to know if you will call in refill for this. Please cari into [facility] [phone].  Pt is reachable at [phone]

## 2023-05-23 NOTE — TELEPHONE ENCOUNTER
Pended rx upon approval- meloxicam 15mg daily. Was given at ortho asked if can get refill please.    Last ov 1/18/23  next ov not scheduled

## 2023-05-24 RX ORDER — MELOXICAM 15 MG/1
15 TABLET ORAL DAILY PRN
Qty: 30 TABLET | Refills: 0 | Status: SHIPPED | OUTPATIENT
Start: 2023-05-24

## 2023-05-24 NOTE — TELEPHONE ENCOUNTER
I sent in a 30 day supply. This medication should be taken for shortest course possible and not taken long term. Might suggest taking every other day or just as needed. Please document call and then close encounter.   thanks

## 2023-07-11 DIAGNOSIS — M17.11 ARTHRITIS OF RIGHT KNEE: ICD-10-CM

## 2023-07-11 RX ORDER — MELOXICAM 15 MG/1
15 TABLET ORAL DAILY PRN
Qty: 30 TABLET | Refills: 0 | Status: SHIPPED | OUTPATIENT
Start: 2023-07-11 | End: 2023-08-28 | Stop reason: SDUPTHER

## 2023-07-25 NOTE — TELEPHONE ENCOUNTER
Pt called in stating that he needed a refill of Sildenasil. Pt did not say what the dosage was. To be called into [facility] on [address].  Pt is reachable [phone]

## 2023-07-26 NOTE — TELEPHONE ENCOUNTER
Medication:   Requested Prescriptions     Pending Prescriptions Disp Refills    sildenafil (REVATIO) 20 MG tablet 90 tablet 0     Sig: Take 1 tablet by mouth daily as needed (sexual activity)        Last Filled:  04/18/2021    Patient Phone Number: [phone] (home)     Last appt: 1/18/2023   Next appt: Visit date not found    Last OARRS:   RX Monitoring 10/5/2015   Attestation The Prescription Monitoring Report for this patient was reviewed today. Periodic Controlled Substance Monitoring No signs of potential drug abuse or diversion identified.

## 2023-07-27 RX ORDER — SILDENAFIL CITRATE 20 MG/1
20 TABLET ORAL DAILY PRN
Qty: 90 TABLET | Refills: 0 | Status: SHIPPED | OUTPATIENT
Start: 2023-07-27

## 2023-08-28 DIAGNOSIS — M17.11 ARTHRITIS OF RIGHT KNEE: ICD-10-CM

## 2023-08-28 RX ORDER — MELOXICAM 15 MG/1
15 TABLET ORAL DAILY PRN
Qty: 30 TABLET | Refills: 0 | Status: SHIPPED | OUTPATIENT
Start: 2023-08-28

## 2023-08-28 NOTE — TELEPHONE ENCOUNTER
Called pharm a week ago for refill. [place]aving [place] on [place] a week and half.      Please call in refill of meloxicam (MOBIC) 15 MG tablet    Please call into [facility]

## 2023-08-28 NOTE — TELEPHONE ENCOUNTER
Pt called in stating that he is leaving the country and cannot leave without it. Pt said that he spoke with someone this morning and has not heard anything back.  Please advise

## 2023-10-19 ENCOUNTER — TELEPHONE (OUTPATIENT)
Dept: FAMILY MEDICINE CLINIC | Age: 58
End: 2023-10-19

## 2023-10-19 DIAGNOSIS — M17.11 ARTHRITIS OF RIGHT KNEE: ICD-10-CM

## 2023-10-19 NOTE — TELEPHONE ENCOUNTER
Requesting refills on:   meloxicam (MOBIC) 15 MG tablet     Take 1 tablet by mouth daily as needed for Pain     Please call into [facility] [phone].  Pt is reachable at [phone]

## 2023-10-20 RX ORDER — MELOXICAM 15 MG/1
15 TABLET ORAL DAILY PRN
Qty: 30 TABLET | Refills: 0 | Status: SHIPPED | OUTPATIENT
Start: 2023-10-20

## 2023-11-14 ENCOUNTER — OFFICE VISIT (OUTPATIENT)
Dept: FAMILY MEDICINE CLINIC | Age: 58
End: 2023-11-14
Payer: COMMERCIAL

## 2023-11-14 VITALS
HEIGHT: 68 IN | SYSTOLIC BLOOD PRESSURE: 128 MMHG | DIASTOLIC BLOOD PRESSURE: 68 MMHG | OXYGEN SATURATION: 96 % | WEIGHT: 182.2 LBS | HEART RATE: 69 BPM | BODY MASS INDEX: 27.61 KG/M2

## 2023-11-14 DIAGNOSIS — E78.5 HYPERLIPIDEMIA, UNSPECIFIED HYPERLIPIDEMIA TYPE: ICD-10-CM

## 2023-11-14 DIAGNOSIS — L03.012 PARONYCHIA OF FINGER OF LEFT HAND: ICD-10-CM

## 2023-11-14 DIAGNOSIS — Z00.00 ENCOUNTER FOR ANNUAL PHYSICAL EXAM: Primary | ICD-10-CM

## 2023-11-14 DIAGNOSIS — R73.03 PREDIABETES: ICD-10-CM

## 2023-11-14 DIAGNOSIS — I10 ESSENTIAL HYPERTENSION: ICD-10-CM

## 2023-11-14 DIAGNOSIS — Z12.5 SCREENING FOR PROSTATE CANCER: ICD-10-CM

## 2023-11-14 PROCEDURE — 1036F TOBACCO NON-USER: CPT | Performed by: NURSE PRACTITIONER

## 2023-11-14 PROCEDURE — 3017F COLORECTAL CA SCREEN DOC REV: CPT | Performed by: NURSE PRACTITIONER

## 2023-11-14 PROCEDURE — 3074F SYST BP LT 130 MM HG: CPT | Performed by: NURSE PRACTITIONER

## 2023-11-14 PROCEDURE — 99214 OFFICE O/P EST MOD 30 MIN: CPT | Performed by: NURSE PRACTITIONER

## 2023-11-14 PROCEDURE — G8427 DOCREV CUR MEDS BY ELIG CLIN: HCPCS | Performed by: NURSE PRACTITIONER

## 2023-11-14 PROCEDURE — G8419 CALC BMI OUT NRM PARAM NOF/U: HCPCS | Performed by: NURSE PRACTITIONER

## 2023-11-14 PROCEDURE — G8484 FLU IMMUNIZE NO ADMIN: HCPCS | Performed by: NURSE PRACTITIONER

## 2023-11-14 PROCEDURE — 36415 COLL VENOUS BLD VENIPUNCTURE: CPT | Performed by: NURSE PRACTITIONER

## 2023-11-14 PROCEDURE — 3078F DIAST BP <80 MM HG: CPT | Performed by: NURSE PRACTITIONER

## 2023-11-14 RX ORDER — CLINDAMYCIN HYDROCHLORIDE 300 MG/1
300 CAPSULE ORAL 3 TIMES DAILY
Qty: 14 CAPSULE | Refills: 0 | Status: SHIPPED | OUTPATIENT
Start: 2023-11-14 | End: 2023-11-19

## 2023-11-14 SDOH — ECONOMIC STABILITY: INCOME INSECURITY: HOW HARD IS IT FOR YOU TO PAY FOR THE VERY BASICS LIKE FOOD, HOUSING, MEDICAL CARE, AND HEATING?: NOT HARD AT ALL

## 2023-11-14 SDOH — ECONOMIC STABILITY: FOOD INSECURITY: WITHIN THE PAST 12 MONTHS, YOU WORRIED THAT YOUR FOOD WOULD RUN OUT BEFORE YOU GOT MONEY TO BUY MORE.: NEVER TRUE

## 2023-11-14 SDOH — ECONOMIC STABILITY: HOUSING INSECURITY
IN THE LAST 12 MONTHS, WAS THERE A TIME WHEN YOU DID NOT HAVE A STEADY PLACE TO SLEEP OR SLEPT IN A SHELTER (INCLUDING NOW)?: NO

## 2023-11-14 SDOH — ECONOMIC STABILITY: FOOD INSECURITY: WITHIN THE PAST 12 MONTHS, THE FOOD YOU BOUGHT JUST DIDN'T LAST AND YOU DIDN'T HAVE MONEY TO GET MORE.: NEVER TRUE

## 2023-11-14 ASSESSMENT — ENCOUNTER SYMPTOMS
VOMITING: 0
COLOR CHANGE: 0
RESPIRATORY NEGATIVE: 1
CONSTIPATION: 0
EYES NEGATIVE: 1
ALLERGIC/IMMUNOLOGIC NEGATIVE: 1
SINUS PRESSURE: 0
TROUBLE SWALLOWING: 0
SINUS PAIN: 0
ABDOMINAL PAIN: 0
SORE THROAT: 0
RHINORRHEA: 0
BACK PAIN: 0
NAUSEA: 0
DIARRHEA: 0

## 2023-11-14 ASSESSMENT — PATIENT HEALTH QUESTIONNAIRE - PHQ9
SUM OF ALL RESPONSES TO PHQ QUESTIONS 1-9: 0
SUM OF ALL RESPONSES TO PHQ QUESTIONS 1-9: 0
SUM OF ALL RESPONSES TO PHQ9 QUESTIONS 1 & 2: 0
SUM OF ALL RESPONSES TO PHQ QUESTIONS 1-9: 0
1. LITTLE INTEREST OR PLEASURE IN DOING THINGS: 0
2. FEELING DOWN, DEPRESSED OR HOPELESS: 0
SUM OF ALL RESPONSES TO PHQ QUESTIONS 1-9: 0

## 2023-11-14 NOTE — PROGRESS NOTES
Dispense: 15 g; Refill: 0  - clindamycin (CLEOCIN) 300 MG capsule; Take 1 capsule by mouth 3 times daily for 5 days  Dispense: 14 capsule; Refill: 0        Care Gaps Addressed  Flu shot offered - declined   Labs drawn    I have reviewed patient's pertinent medical history, relevant laboratory and imaging studies, and past/future health maintenance. Discussed with the patient the importance of adhering to their current medication regimen as directed. Advised the patient that they should continue to work on eating a healthy balanced diet and staying active by exercising within their personal limits. Orders as listed above. Patient was advised to keep future appointments with their respective specialty care team(s). Patient had the opportunity to ask questions, all of which were answered to the best of my ability and with patient satisfaction. Patient understands and is agreeable with the care plan following today's visit. Patient is to schedule an appointment for any new or worsening symptoms. Go to ER for significant shortness of breath, chest pain, or uncontrolled pain or fever. I discussed with patient the risk and benefits of any medications that were prescribed today. I verified that the patient understands their medications, labs, and/or procedures. The patient is doing well with current medication regimen and does not have any barriers to adherence. The patient's self-management abilities are good.      Follow Up in 6 Months for fasting labs and medication check

## 2023-11-15 LAB
ALBUMIN SERPL-MCNC: 4.5 G/DL (ref 3.4–5)
ALBUMIN/GLOB SERPL: 2 {RATIO} (ref 1.1–2.2)
ALP SERPL-CCNC: 69 U/L (ref 40–129)
ALT SERPL-CCNC: 21 U/L (ref 10–40)
ANION GAP SERPL CALCULATED.3IONS-SCNC: 13 MMOL/L (ref 3–16)
AST SERPL-CCNC: 29 U/L (ref 15–37)
BILIRUB SERPL-MCNC: 0.6 MG/DL (ref 0–1)
BUN SERPL-MCNC: 14 MG/DL (ref 7–20)
CALCIUM SERPL-MCNC: 9.4 MG/DL (ref 8.3–10.6)
CHLORIDE SERPL-SCNC: 102 MMOL/L (ref 99–110)
CHOLEST SERPL-MCNC: 207 MG/DL (ref 0–199)
CO2 SERPL-SCNC: 21 MMOL/L (ref 21–32)
CREAT SERPL-MCNC: 0.9 MG/DL (ref 0.9–1.3)
EST. AVERAGE GLUCOSE BLD GHB EST-MCNC: 116.9 MG/DL
GFR SERPLBLD CREATININE-BSD FMLA CKD-EPI: >60 ML/MIN/{1.73_M2}
GLUCOSE SERPL-MCNC: 107 MG/DL (ref 70–99)
HBA1C MFR BLD: 5.7 %
HDLC SERPL-MCNC: 65 MG/DL (ref 40–60)
LDLC SERPL CALC-MCNC: 119 MG/DL
POTASSIUM SERPL-SCNC: 4.4 MMOL/L (ref 3.5–5.1)
PROT SERPL-MCNC: 6.7 G/DL (ref 6.4–8.2)
PSA SERPL DL<=0.01 NG/ML-MCNC: 0.29 NG/ML (ref 0–4)
SODIUM SERPL-SCNC: 136 MMOL/L (ref 136–145)
TRIGL SERPL-MCNC: 114 MG/DL (ref 0–150)
VLDLC SERPL CALC-MCNC: 23 MG/DL

## 2023-11-27 DIAGNOSIS — M17.11 ARTHRITIS OF RIGHT KNEE: ICD-10-CM

## 2023-11-28 RX ORDER — MELOXICAM 15 MG/1
15 TABLET ORAL DAILY PRN
Qty: 30 TABLET | Refills: 5 | Status: SHIPPED | OUTPATIENT
Start: 2023-11-28

## 2024-03-26 RX ORDER — LISINOPRIL 10 MG/1
TABLET ORAL
Qty: 90 TABLET | Refills: 3 | Status: SHIPPED | OUTPATIENT
Start: 2024-03-26

## 2024-03-26 NOTE — TELEPHONE ENCOUNTER
Medication:   Requested Prescriptions     Pending Prescriptions Disp Refills    lisinopril (PRINIVIL;ZESTRIL) 10 MG tablet [Pharmacy Med Name: LISINOPRIL 10 MG TABLET] 90 tablet 3     Sig: TAKE ONE TABLET BY MOUTH DAILY       Last Filled:  3/16/2023    Patient Phone Number: [phone] (home)     Last appt: 11/14/2023   Next appt: 5/14/2024

## 2024-03-27 RX ORDER — SILDENAFIL CITRATE 20 MG/1
20 TABLET ORAL DAILY PRN
Qty: 90 TABLET | Refills: 0 | Status: SHIPPED | OUTPATIENT
Start: 2024-03-27

## 2024-03-27 NOTE — TELEPHONE ENCOUNTER
Medication:   Requested Prescriptions     Pending Prescriptions Disp Refills    sildenafil (REVATIO) 20 MG tablet [Pharmacy Med Name: SILDENAFIL 20 MG TABLET] 90 tablet 0     Sig: TAKE 1 TABLET BY MOUTH DAILY AS NEEDED        Last Filled:  07/27/23    Patient Phone Number: [phone] (home)     Last appt: 11/14/2023   Next appt: 5/14/2024    Last OARRS:       10/5/2015    11:32 AM   RX Monitoring   Attestation The Prescription Monitoring Report for this patient was reviewed today.   Periodic Controlled Substance Monitoring No signs of potential drug abuse or diversion identified.

## 2024-06-26 DIAGNOSIS — M17.11 ARTHRITIS OF RIGHT KNEE: ICD-10-CM

## 2024-06-26 RX ORDER — MELOXICAM 15 MG/1
15 TABLET ORAL DAILY PRN
Qty: 90 TABLET | Refills: 3 | Status: SHIPPED | OUTPATIENT
Start: 2024-06-26 | End: 2025-06-21

## 2024-06-26 NOTE — TELEPHONE ENCOUNTER
Medication:   Requested Prescriptions     Pending Prescriptions Disp Refills    meloxicam (MOBIC) 15 MG tablet [Pharmacy Med Name: MELOXICAM 15 MG TABLET] 90 tablet 3     Sig: Take 1 tablet by mouth daily as needed for Pain       Last Filled:  11/28/2023    Patient Phone Number: [phone] (home)     Last appt: 11/14/2023   Next appt: Visit date not found

## 2024-10-01 ENCOUNTER — TELEPHONE (OUTPATIENT)
Dept: FAMILY MEDICINE CLINIC | Age: 59
End: 2024-10-01

## 2024-10-01 NOTE — TELEPHONE ENCOUNTER
Called [facility], this has not happened before. Concern if it is anemic, stress, BP has appt set up with Karen.

## 2024-10-01 NOTE — TELEPHONE ENCOUNTER
Pt called in stating that the inside of his eye is red. Pt said that this has happened 2 times in the past and that this is the 3rd time. Pt said that there is no pain but it is bothering him today, but said that his eye is not blurry and does not affect him daily. Pt asked for a recommendation for an eye doctor. Pt is reachable at [phone]

## 2024-10-02 ENCOUNTER — OFFICE VISIT (OUTPATIENT)
Dept: FAMILY MEDICINE CLINIC | Age: 59
End: 2024-10-02
Payer: COMMERCIAL

## 2024-10-02 VITALS
OXYGEN SATURATION: 97 % | HEART RATE: 74 BPM | HEIGHT: 68 IN | SYSTOLIC BLOOD PRESSURE: 146 MMHG | BODY MASS INDEX: 27.74 KG/M2 | DIASTOLIC BLOOD PRESSURE: 84 MMHG | TEMPERATURE: 98.1 F | WEIGHT: 183 LBS

## 2024-10-02 DIAGNOSIS — H11.32 CONJUNCTIVAL HEMORRHAGE OF LEFT EYE: Primary | ICD-10-CM

## 2024-10-02 DIAGNOSIS — I10 ESSENTIAL HYPERTENSION: ICD-10-CM

## 2024-10-02 PROCEDURE — G8484 FLU IMMUNIZE NO ADMIN: HCPCS | Performed by: NURSE PRACTITIONER

## 2024-10-02 PROCEDURE — G8419 CALC BMI OUT NRM PARAM NOF/U: HCPCS | Performed by: NURSE PRACTITIONER

## 2024-10-02 PROCEDURE — G8427 DOCREV CUR MEDS BY ELIG CLIN: HCPCS | Performed by: NURSE PRACTITIONER

## 2024-10-02 PROCEDURE — 3077F SYST BP >= 140 MM HG: CPT | Performed by: NURSE PRACTITIONER

## 2024-10-02 PROCEDURE — 36415 COLL VENOUS BLD VENIPUNCTURE: CPT | Performed by: NURSE PRACTITIONER

## 2024-10-02 PROCEDURE — 1036F TOBACCO NON-USER: CPT | Performed by: NURSE PRACTITIONER

## 2024-10-02 PROCEDURE — 99213 OFFICE O/P EST LOW 20 MIN: CPT | Performed by: NURSE PRACTITIONER

## 2024-10-02 PROCEDURE — 3017F COLORECTAL CA SCREEN DOC REV: CPT | Performed by: NURSE PRACTITIONER

## 2024-10-02 PROCEDURE — 3079F DIAST BP 80-89 MM HG: CPT | Performed by: NURSE PRACTITIONER

## 2024-10-02 RX ORDER — CEPHALEXIN 500 MG/1
500 CAPSULE ORAL 2 TIMES DAILY
Qty: 14 CAPSULE | Refills: 0 | Status: SHIPPED | OUTPATIENT
Start: 2024-10-02 | End: 2024-10-09

## 2024-10-02 RX ORDER — HYDROCHLOROTHIAZIDE 25 MG/1
25 TABLET ORAL EVERY MORNING
Qty: 90 TABLET | Refills: 1 | Status: SHIPPED | OUTPATIENT
Start: 2024-10-02

## 2024-10-02 ASSESSMENT — PATIENT HEALTH QUESTIONNAIRE - PHQ9
SUM OF ALL RESPONSES TO PHQ QUESTIONS 1-9: 0
SUM OF ALL RESPONSES TO PHQ QUESTIONS 1-9: 0
SUM OF ALL RESPONSES TO PHQ9 QUESTIONS 1 & 2: 0
SUM OF ALL RESPONSES TO PHQ QUESTIONS 1-9: 0
SUM OF ALL RESPONSES TO PHQ QUESTIONS 1-9: 0
2. FEELING DOWN, DEPRESSED OR HOPELESS: NOT AT ALL
1. LITTLE INTEREST OR PLEASURE IN DOING THINGS: NOT AT ALL

## 2024-10-02 NOTE — PROGRESS NOTES
Thuan Brown (:  1965) is a 59 y.o. male,Established patient, here for evaluation of the following chief complaint(s):  Eye Problem (Left eye, red)         Assessment & Plan  Conjunctival hemorrhage of left eye     - monitor the hemorrhage  - will give abx prophylactic in case it gets infected  - blood work to make sure levels aren't low    Orders:    cephALEXin (KEFLEX) 500 MG capsule; Take 1 capsule by mouth 2 times daily for 7 days    CBC with Auto Differential    Protime-INR    Essential hypertension       Orders:    hydroCHLOROthiazide (HYDRODIURIL) 25 MG tablet; Take 1 tablet by mouth every morning    Comprehensive Metabolic Panel  - will add thiazide to help lower BP  -# CKD check renal today  Lab Results   Component Value Date/Time     10/02/2024 04:11 PM    K 4.2 10/02/2024 04:11 PM    K 4.4 2023 06:59 AM     10/02/2024 04:11 PM    CO2 23 10/02/2024 04:11 PM    BUN 14 10/02/2024 04:11 PM    CREATININE 1.0 10/02/2024 04:11 PM    GLUCOSE 112 10/02/2024 04:11 PM    CALCIUM 9.8 10/02/2024 04:11 PM      BP Readings from Last 3 Encounters:   10/02/24 (!) 146/84   23 128/68   23 136/78         No follow-ups on file.       Subjective   HPI    Patient presents today for redness in his left eye. [facility] has had this issue a few times. States he does take meloxicam daily for knee pain. States he has been taking this for about a year and a half. States his BP has been elevated as well. Denies headaches, blurred vision, numbness, weakness, n/v. States was recently out of town and the redness in his eye appeared when he got home. Denies any exertion, sneezing, cough, n/v. Denies any history of bleeding or other blood thinners.     Review of Systems   See HPI    Objective   Physical Exam  Vitals and nursing note reviewed.   Constitutional:       Appearance: Normal appearance.   Eyes:      Conjunctiva/sclera:      Left eye: Hemorrhage present.   Cardiovascular:      Rate and

## 2024-10-02 NOTE — TELEPHONE ENCOUNTER
Pt called back. Tried to explain to him that taurus wanted to change to vv. P[t said that if he is going to be charged for this visit he doesn't want it. He said he believes there is an underlying issue that can be found in his BP or blood work. Pt is reachable at [phone]

## 2024-10-03 LAB
ALBUMIN SERPL-MCNC: 4.6 G/DL (ref 3.4–5)
ALBUMIN/GLOB SERPL: 1.8 {RATIO} (ref 1.1–2.2)
ALP SERPL-CCNC: 60 U/L (ref 40–129)
ALT SERPL-CCNC: 27 U/L (ref 10–40)
ANION GAP SERPL CALCULATED.3IONS-SCNC: 12 MMOL/L (ref 3–16)
AST SERPL-CCNC: 32 U/L (ref 15–37)
BASOPHILS # BLD: 0.1 K/UL (ref 0–0.2)
BASOPHILS NFR BLD: 1.6 %
BILIRUB SERPL-MCNC: 0.6 MG/DL (ref 0–1)
BUN SERPL-MCNC: 14 MG/DL (ref 7–20)
CALCIUM SERPL-MCNC: 9.8 MG/DL (ref 8.3–10.6)
CHLORIDE SERPL-SCNC: 103 MMOL/L (ref 99–110)
CO2 SERPL-SCNC: 23 MMOL/L (ref 21–32)
CREAT SERPL-MCNC: 1 MG/DL (ref 0.9–1.3)
DEPRECATED RDW RBC AUTO: 13.2 % (ref 12.4–15.4)
EOSINOPHIL # BLD: 0.2 K/UL (ref 0–0.6)
EOSINOPHIL NFR BLD: 2.8 %
GFR SERPLBLD CREATININE-BSD FMLA CKD-EPI: 88 ML/MIN/{1.73_M2}
GLUCOSE SERPL-MCNC: 112 MG/DL (ref 70–99)
HCT VFR BLD AUTO: 43.4 % (ref 40.5–52.5)
HGB BLD-MCNC: 14.8 G/DL (ref 13.5–17.5)
INR PPP: 0.94 (ref 0.85–1.15)
LYMPHOCYTES # BLD: 1.2 K/UL (ref 1–5.1)
LYMPHOCYTES NFR BLD: 20.9 %
MCH RBC QN AUTO: 30.6 PG (ref 26–34)
MCHC RBC AUTO-ENTMCNC: 34 G/DL (ref 31–36)
MCV RBC AUTO: 90 FL (ref 80–100)
MONOCYTES # BLD: 0.4 K/UL (ref 0–1.3)
MONOCYTES NFR BLD: 7.9 %
NEUTROPHILS # BLD: 3.8 K/UL (ref 1.7–7.7)
NEUTROPHILS NFR BLD: 66.8 %
PLATELET # BLD AUTO: 209 K/UL (ref 135–450)
PMV BLD AUTO: 9.1 FL (ref 5–10.5)
POTASSIUM SERPL-SCNC: 4.2 MMOL/L (ref 3.5–5.1)
PROT SERPL-MCNC: 7.1 G/DL (ref 6.4–8.2)
PROTHROMBIN TIME: 12.8 SEC (ref 11.9–14.9)
RBC # BLD AUTO: 4.82 M/UL (ref 4.2–5.9)
SODIUM SERPL-SCNC: 138 MMOL/L (ref 136–145)
WBC # BLD AUTO: 5.6 K/UL (ref 4–11)

## 2024-10-03 NOTE — ASSESSMENT & PLAN NOTE
Orders:    hydroCHLOROthiazide (HYDRODIURIL) 25 MG tablet; Take 1 tablet by mouth every morning    Comprehensive Metabolic Panel  - will add thiazide to help lower BP  -# CKD check renal today  Lab Results   Component Value Date/Time     10/02/2024 04:11 PM    K 4.2 10/02/2024 04:11 PM    K 4.4 02/03/2023 06:59 AM     10/02/2024 04:11 PM    CO2 23 10/02/2024 04:11 PM    BUN 14 10/02/2024 04:11 PM    CREATININE 1.0 10/02/2024 04:11 PM    GLUCOSE 112 10/02/2024 04:11 PM    CALCIUM 9.8 10/02/2024 04:11 PM      BP Readings from Last 3 Encounters:   10/02/24 (!) 146/84   11/14/23 128/68   02/03/23 136/78

## 2024-10-07 ENCOUNTER — TELEPHONE (OUTPATIENT)
Dept: FAMILY MEDICINE CLINIC | Age: 59
End: 2024-10-07

## 2024-10-07 NOTE — TELEPHONE ENCOUNTER
Called/spoke to pt, informed of Dr. Ortega's message in results notes :    Per pt, he takes meloxicam for joint pain in his kness. Please advise

## 2024-10-17 NOTE — TELEPHONE ENCOUNTER
Requesting refill on:     sildenafil (REVATIO) 20 MG tablet    Pt called pharm with request. Please call refill into [facility]

## 2024-10-17 NOTE — TELEPHONE ENCOUNTER
Medication:   Requested Prescriptions     Pending Prescriptions Disp Refills    sildenafil (REVATIO) 20 MG tablet 90 tablet 0     Sig: Take 1 tablet by mouth daily as needed (TAKE 1 TABLET BY MOUTH DAILY AS NEEDED)        Last Filled:  03/27/2024    Patient Phone Number: [phone] (home)     Last appt: 10/2/2024   Next appt: Visit date not found    Last OARRS:       10/5/2015    11:32 AM   RX Monitoring   Attestation The Prescription Monitoring Report for this patient was reviewed today.   Periodic Controlled Substance Monitoring No signs of potential drug abuse or diversion identified.

## 2024-10-18 RX ORDER — SILDENAFIL CITRATE 20 MG/1
20 TABLET ORAL DAILY PRN
Qty: 90 TABLET | Refills: 0 | Status: SHIPPED | OUTPATIENT
Start: 2024-10-18

## 2025-01-20 ENCOUNTER — OFFICE VISIT (OUTPATIENT)
Dept: ORTHOPEDIC SURGERY | Age: 60
End: 2025-01-20
Payer: COMMERCIAL

## 2025-01-20 VITALS — WEIGHT: 179 LBS | BODY MASS INDEX: 27.13 KG/M2 | HEIGHT: 68 IN

## 2025-01-20 DIAGNOSIS — S46.011A ROTATOR CUFF STRAIN, RIGHT, INITIAL ENCOUNTER: ICD-10-CM

## 2025-01-20 DIAGNOSIS — M25.511 ACUTE PAIN OF RIGHT SHOULDER: Primary | ICD-10-CM

## 2025-01-20 PROCEDURE — 99214 OFFICE O/P EST MOD 30 MIN: CPT | Performed by: ORTHOPAEDIC SURGERY

## 2025-01-20 PROCEDURE — G8427 DOCREV CUR MEDS BY ELIG CLIN: HCPCS | Performed by: ORTHOPAEDIC SURGERY

## 2025-01-20 PROCEDURE — 3017F COLORECTAL CA SCREEN DOC REV: CPT | Performed by: ORTHOPAEDIC SURGERY

## 2025-01-20 PROCEDURE — 1036F TOBACCO NON-USER: CPT | Performed by: ORTHOPAEDIC SURGERY

## 2025-01-20 PROCEDURE — G8419 CALC BMI OUT NRM PARAM NOF/U: HCPCS | Performed by: ORTHOPAEDIC SURGERY

## 2025-01-20 RX ORDER — METHYLPREDNISOLONE 4 MG/1
TABLET ORAL
Qty: 1 KIT | Refills: 0 | Status: SHIPPED | OUTPATIENT
Start: 2025-01-20

## 2025-01-20 RX ORDER — TRALOKINUMAB-LDRM 150 MG/ML
INJECTION, SOLUTION SUBCUTANEOUS
COMMUNITY
Start: 2024-12-14

## 2025-01-20 RX ORDER — RUXOLITINIB 15 MG/G
CREAM TOPICAL
COMMUNITY
Start: 2024-11-20

## 2025-01-20 NOTE — PROGRESS NOTES
Thuan Brown is seen today with complaints of right shoulder pain.    He said he had a bicep tendon injury more than 20 years ago treated conservatively but his right shoulder has been fine until about 1 month ago.  He is rehabbing a house that he recently bought and began having some soreness and then 2 days ago was working in the attic on plumbing and as he transferred his weight felt intense pain and a pop in his right shoulder.  Pain has been as bad as 8 out of 10.  He has been using ice and meloxicam.  He has pain reaching overhead and behind his back.    In 2020 he had a rotator cuff tear in his left shoulder.  He elected to treat that nonoperatively with therapy and it has done well.          I have also treated his knees in the past.      Past medical history significant for hypertension    I have treated both of his daughters for ACL tears.    History: Patient's relevant past family, medical, and social history are reviewed as part of today's visit. ROS of pertinent positives and negatives as above; otherwise negative.    General Exam:    Vitals: Height 1.727 m (5' 8\"), weight 81.2 kg (179 lb).  Constitutional: Patient is adequately groomed with no evidence of malnutrition  Mental Status: The patient is oriented to time, place and person.  The patient's mood and affect are appropriate.  Gait:  Patient walks with normal gait and station.  Lymphatic: The lymphatic examination bilaterally reveals all areas to be without enlargement or induration.  Vascular: Examination reveals no swelling or calf tenderness.  Peripheral pulses are palpable and 2+.  Neurological: The patient has good coordination.  There is no weakness or sensory deficit.    Skin:    Head/Neck: inspection reveals no rashes, ulcerations or lesions.  Trunk:  inspection reveals no rashes, ulcerations or lesions.  Right Lower Extremity: inspection reveals no rashes, ulcerations or lesions.  Left Lower Extremity: inspection reveals no rashes,

## 2025-04-03 ENCOUNTER — TELEPHONE (OUTPATIENT)
Dept: FAMILY MEDICINE CLINIC | Age: 60
End: 2025-04-03

## 2025-04-03 NOTE — TELEPHONE ENCOUNTER
Calling for refills. [facility] pharm has been reaching out to office since Monday for refills, and has not heard back. Pt is requesting refills on:     lisinopril (PRINIVIL;ZESTRIL) 10 MG tablet    OPZELURA 1.5 % CREA     [facility]. Pt is reachable at [phone]

## 2025-04-03 NOTE — TELEPHONE ENCOUNTER
VM left for pt to clarify cream request we do not normally fill, need directions and to find out whom normally fills.

## 2025-04-03 NOTE — TELEPHONE ENCOUNTER
Pt called back cream is from derm, he will reach out to them. States that with the meloxicam he believes he needs sooner. Did discuss with him he is overdue for a physical with pcp. States that he needs later appt though. Saw Karen cunningham asked if ok to est

## 2025-04-04 RX ORDER — LISINOPRIL 10 MG/1
TABLET ORAL
Qty: 90 TABLET | Refills: 0 | Status: SHIPPED | OUTPATIENT
Start: 2025-04-04

## 2025-04-04 NOTE — TELEPHONE ENCOUNTER
Not sure what is going on here; Yumiko pls clarify    Chuck has been my patient in past for years; he is welcome to return for OV w me.    If he wants a different provider yes he can establish with Dr Shah, but that is not my request    I refilled #90 of lisinopril

## 2025-04-04 NOTE — TELEPHONE ENCOUNTER
Pt states he has a hard time getting off of work. Is wanting a provider that has openings as late as possible preferably after [phone]

## 2025-04-21 RX ORDER — SILDENAFIL CITRATE 20 MG/1
20 TABLET ORAL DAILY PRN
Qty: 90 TABLET | Refills: 1 | Status: SHIPPED | OUTPATIENT
Start: 2025-04-21

## 2025-04-30 DIAGNOSIS — I10 ESSENTIAL HYPERTENSION: ICD-10-CM

## 2025-05-02 NOTE — TELEPHONE ENCOUNTER
Medication:   Requested Prescriptions     Pending Prescriptions Disp Refills    hydroCHLOROthiazide (HYDRODIURIL) 25 MG tablet [Pharmacy Med Name: hydroCHLOROthiazide 25 MG TABLET] 30 tablet 0     Sig: TAKE 1 TABLET BY MOUTH EVERY MORNING        Last Filled:      Patient Phone Number: [phone] (home)     Last appt: 10/2/2024   Next appt: Visit date not found    Last OARRS:       10/5/2015    11:32 AM   RX Monitoring   Attestation The Prescription Monitoring Report for this patient was reviewed today.   Periodic Controlled Substance Monitoring No signs of potential drug abuse or diversion identified.

## 2025-05-05 RX ORDER — HYDROCHLOROTHIAZIDE 25 MG/1
25 TABLET ORAL EVERY MORNING
Qty: 30 TABLET | Refills: 0 | Status: SHIPPED | OUTPATIENT
Start: 2025-05-05

## 2025-05-14 ENCOUNTER — OFFICE VISIT (OUTPATIENT)
Dept: FAMILY MEDICINE CLINIC | Age: 60
End: 2025-05-14
Payer: COMMERCIAL

## 2025-05-14 VITALS
BODY MASS INDEX: 26.98 KG/M2 | DIASTOLIC BLOOD PRESSURE: 84 MMHG | HEART RATE: 67 BPM | WEIGHT: 178 LBS | RESPIRATION RATE: 16 BRPM | SYSTOLIC BLOOD PRESSURE: 136 MMHG | OXYGEN SATURATION: 95 % | HEIGHT: 68 IN

## 2025-05-14 DIAGNOSIS — E78.2 MIXED HYPERLIPIDEMIA: ICD-10-CM

## 2025-05-14 DIAGNOSIS — M25.511 CHRONIC RIGHT SHOULDER PAIN: ICD-10-CM

## 2025-05-14 DIAGNOSIS — R73.09 ELEVATED GLUCOSE: ICD-10-CM

## 2025-05-14 DIAGNOSIS — M17.11 ARTHRITIS OF RIGHT KNEE: ICD-10-CM

## 2025-05-14 DIAGNOSIS — T39.395A ADVERSE EFFECT OF NON-STEROIDAL ANTI-INFLAMMATORY DRUG (NSAID), INITIAL ENCOUNTER: ICD-10-CM

## 2025-05-14 DIAGNOSIS — I10 ESSENTIAL HYPERTENSION: Primary | ICD-10-CM

## 2025-05-14 DIAGNOSIS — G89.29 CHRONIC RIGHT SHOULDER PAIN: ICD-10-CM

## 2025-05-14 DIAGNOSIS — Z11.59 NEED FOR HEPATITIS C SCREENING TEST: ICD-10-CM

## 2025-05-14 DIAGNOSIS — Z12.5 SCREENING FOR PROSTATE CANCER: ICD-10-CM

## 2025-05-14 PROCEDURE — 3017F COLORECTAL CA SCREEN DOC REV: CPT | Performed by: INTERNAL MEDICINE

## 2025-05-14 PROCEDURE — 99214 OFFICE O/P EST MOD 30 MIN: CPT | Performed by: INTERNAL MEDICINE

## 2025-05-14 PROCEDURE — 3079F DIAST BP 80-89 MM HG: CPT | Performed by: INTERNAL MEDICINE

## 2025-05-14 PROCEDURE — G8419 CALC BMI OUT NRM PARAM NOF/U: HCPCS | Performed by: INTERNAL MEDICINE

## 2025-05-14 PROCEDURE — 3075F SYST BP GE 130 - 139MM HG: CPT | Performed by: INTERNAL MEDICINE

## 2025-05-14 PROCEDURE — 36415 COLL VENOUS BLD VENIPUNCTURE: CPT | Performed by: INTERNAL MEDICINE

## 2025-05-14 PROCEDURE — G8427 DOCREV CUR MEDS BY ELIG CLIN: HCPCS | Performed by: INTERNAL MEDICINE

## 2025-05-14 PROCEDURE — 1036F TOBACCO NON-USER: CPT | Performed by: INTERNAL MEDICINE

## 2025-05-14 RX ORDER — CLOBETASOL PROPIONATE 0.5 MG/G
OINTMENT TOPICAL
COMMUNITY
Start: 2025-04-10

## 2025-05-14 RX ORDER — AMLODIPINE BESYLATE 5 MG/1
5 TABLET ORAL DAILY
Qty: 90 TABLET | Refills: 3 | Status: SHIPPED | OUTPATIENT
Start: 2025-05-14 | End: 2026-05-14

## 2025-05-14 SDOH — ECONOMIC STABILITY: FOOD INSECURITY: WITHIN THE PAST 12 MONTHS, THE FOOD YOU BOUGHT JUST DIDN'T LAST AND YOU DIDN'T HAVE MONEY TO GET MORE.: NEVER TRUE

## 2025-05-14 SDOH — ECONOMIC STABILITY: FOOD INSECURITY: WITHIN THE PAST 12 MONTHS, YOU WORRIED THAT YOUR FOOD WOULD RUN OUT BEFORE YOU GOT MONEY TO BUY MORE.: NEVER TRUE

## 2025-05-14 ASSESSMENT — PATIENT HEALTH QUESTIONNAIRE - PHQ9
1. LITTLE INTEREST OR PLEASURE IN DOING THINGS: NOT AT ALL
SUM OF ALL RESPONSES TO PHQ QUESTIONS 1-9: 0
SUM OF ALL RESPONSES TO PHQ QUESTIONS 1-9: 0
2. FEELING DOWN, DEPRESSED OR HOPELESS: NOT AT ALL
SUM OF ALL RESPONSES TO PHQ QUESTIONS 1-9: 0
SUM OF ALL RESPONSES TO PHQ QUESTIONS 1-9: 0

## 2025-05-15 LAB
ALBUMIN SERPL-MCNC: 4.6 G/DL (ref 3.4–5)
ALBUMIN/GLOB SERPL: 1.7 {RATIO} (ref 1.1–2.2)
ALP SERPL-CCNC: 70 U/L (ref 40–129)
ALT SERPL-CCNC: 28 U/L (ref 10–40)
ANION GAP SERPL CALCULATED.3IONS-SCNC: 13 MMOL/L (ref 3–16)
AST SERPL-CCNC: 37 U/L (ref 15–37)
BASOPHILS # BLD: 0 K/UL (ref 0–0.2)
BASOPHILS NFR BLD: 0 %
BILIRUB SERPL-MCNC: 0.6 MG/DL (ref 0–1)
BUN SERPL-MCNC: 14 MG/DL (ref 7–20)
CALCIUM SERPL-MCNC: 10 MG/DL (ref 8.3–10.6)
CHLORIDE SERPL-SCNC: 101 MMOL/L (ref 99–110)
CHOLEST SERPL-MCNC: 234 MG/DL (ref 0–199)
CO2 SERPL-SCNC: 24 MMOL/L (ref 21–32)
CREAT SERPL-MCNC: 1 MG/DL (ref 0.9–1.3)
DEPRECATED RDW RBC AUTO: 13.5 % (ref 12.4–15.4)
EOSINOPHIL # BLD: 0.2 K/UL (ref 0–0.6)
EOSINOPHIL NFR BLD: 3 %
EST. AVERAGE GLUCOSE BLD GHB EST-MCNC: 119.8 MG/DL
GFR SERPLBLD CREATININE-BSD FMLA CKD-EPI: 87 ML/MIN/{1.73_M2}
GLUCOSE SERPL-MCNC: 106 MG/DL (ref 70–99)
HBA1C MFR BLD: 5.8 %
HCT VFR BLD AUTO: 44.8 % (ref 40.5–52.5)
HCV AB SERPL QL IA: NORMAL
HDLC SERPL-MCNC: 67 MG/DL (ref 40–60)
HGB BLD-MCNC: 15.5 G/DL (ref 13.5–17.5)
LDLC SERPL CALC-MCNC: 144 MG/DL
LYMPHOCYTES # BLD: 1 K/UL (ref 1–5.1)
LYMPHOCYTES NFR BLD: 18 %
MCH RBC QN AUTO: 30.1 PG (ref 26–34)
MCHC RBC AUTO-ENTMCNC: 34.6 G/DL (ref 31–36)
MCV RBC AUTO: 87.1 FL (ref 80–100)
MONOCYTES # BLD: 0.9 K/UL (ref 0–1.3)
MONOCYTES NFR BLD: 15 %
NEUTROPHILS # BLD: 3.6 K/UL (ref 1.7–7.7)
NEUTROPHILS NFR BLD: 64 %
PLATELET # BLD AUTO: 216 K/UL (ref 135–450)
PMV BLD AUTO: 9 FL (ref 5–10.5)
POTASSIUM SERPL-SCNC: 4 MMOL/L (ref 3.5–5.1)
PROT SERPL-MCNC: 7.3 G/DL (ref 6.4–8.2)
PSA SERPL DL<=0.01 NG/ML-MCNC: 0.27 NG/ML (ref 0–4)
RBC # BLD AUTO: 5.15 M/UL (ref 4.2–5.9)
RBC MORPH BLD: NORMAL
SLIDE REVIEW: NORMAL
SODIUM SERPL-SCNC: 138 MMOL/L (ref 136–145)
TRIGL SERPL-MCNC: 115 MG/DL (ref 0–150)
VLDLC SERPL CALC-MCNC: 23 MG/DL
WBC # BLD AUTO: 5.7 K/UL (ref 4–11)

## 2025-05-16 ENCOUNTER — RESULTS FOLLOW-UP (OUTPATIENT)
Dept: FAMILY MEDICINE CLINIC | Age: 60
End: 2025-05-16

## 2025-05-21 NOTE — PROGRESS NOTES
[facility]   PCP progress note     Patient: Thuan Brown : 1965  Sex: male  MRN: [id number]    Assessment and Plan:     Assessment & Plan    Hypertension:  - Elevated blood pressure led to hydrochlorothiazide addition  - Discussed hydrochlorothiazide side effects  - Frequent meloxicam use may contribute to elevated blood pressure  - Plan to discontinue hydrochlorothiazide and start amlodipine  - Monitor blood pressure at home and report side effects    HLD:  -Check labs today    Right shoulder pain:  - Consistent pain affecting sleep  - Negative x-rays; no MRI performed  - Advised Voltaren gel and home physical therapy exercises  - Consider further imaging or joint injections if symptoms persist  - check kidney function for chronic NSAID use      Skin rash:  - Occasional rashes on arms and behind knees, responsive to clobetasol  - Continue clobetasol as needed    Health maintenance:  - Declined pneumonia vaccine  - Recommended shingles and COVID-19 vaccines  - Comprehensive lab workup for kidney function, electrolytes, and diabetes screening  - PSA test for prostate cancer screening, recheck in 6 weeks if positive  - Screening for hepatitis C    Follow-up in 6 months          ICD-10-CM    1. Essential hypertension  I10 Comprehensive Metabolic Panel     CBC with Auto Differential     Lipid Panel     Hemoglobin A1C      2. Arthritis of right knee  M17.11 Comprehensive Metabolic Panel     CBC with Auto Differential     Lipid Panel      3. Need for hepatitis C screening test  Z11.59 Hepatitis C Antibody      4. Screening for prostate cancer  Z12.5 PSA Screening      5. Adverse effect of non-steroidal anti-inflammatory drug (NSAID), initial encounter  T39.395A       6. Elevated glucose  R73.09 Hemoglobin A1C      7. Mixed hyperlipidemia  E78.2 Comprehensive Metabolic Panel     CBC with Auto Differential     Lipid Panel               -Risks and benefit as well as most common side effects of new

## 2025-06-02 DIAGNOSIS — M17.11 ARTHRITIS OF RIGHT KNEE: ICD-10-CM

## 2025-06-02 RX ORDER — MELOXICAM 15 MG/1
15 TABLET ORAL DAILY PRN
Qty: 90 TABLET | Refills: 3 | Status: SHIPPED | OUTPATIENT
Start: 2025-06-02 | End: 2026-05-28

## 2025-06-02 NOTE — TELEPHONE ENCOUNTER
Pt called in stating he needs a refill on meloxicam (MOBIC) 15 MG tablet [[id number]] sent to [facility] [id number] [facility] [address] Pt can be reached at [phone] with any questions.

## 2025-06-06 ENCOUNTER — TELEPHONE (OUTPATIENT)
Dept: FAMILY MEDICINE CLINIC | Age: 60
End: 2025-06-06

## 2025-06-06 NOTE — TELEPHONE ENCOUNTER
Pt called in stating he had a vasectomy about 6 or 7 years ago and would like to know if he is still serialized. Pt stated he does not know who to ask. Pt would like a call at [phone].

## 2025-06-06 NOTE — TELEPHONE ENCOUNTER
Please review, wouldn't pt reach out to urology to whom did the procedure and they can test. I see note from Dr Castellano in 2020 seen and tested neg. Noted in media

## (undated) DEVICE — RETRACTOR SURG WIDE FLAT LO PROF LTWT PHOTONGUIDE

## (undated) DEVICE — ELECTRODE BLDE L4IN NONINSULATED EDGE

## (undated) DEVICE — GLOVE ORANGE PI 8   MSG9080

## (undated) DEVICE — SOLUTION IRRIG 3000ML STRL H2O USP UROMATIC PLAS CONT

## (undated) DEVICE — SOLUTION IV 1000ML 0.9% SOD CHL FOR IRRIG PLAS CONT

## (undated) DEVICE — Z DISCONTINUED USE 2716304 SUTURE STRATAFIX SPRL SZ 3-0 L12IN ABSRB UD FS-1 L24MM 3/8

## (undated) DEVICE — GLOVE ORANGE PI 8 1/2   MSG9085

## (undated) DEVICE — TAPE ADH W4INXL5YD WHT COT E BNDG RUB BASE CURAD

## (undated) DEVICE — GOWN,REINF,POLY,AURORA,XLNG/XXL,STRL: Brand: MEDLINE

## (undated) DEVICE — BIPOLAR SEALER 23-112-1 AQM 6.0: Brand: AQUAMANTYS ®

## (undated) DEVICE — DRAPE C ARM UNIV W41XL74IN CLR PLAS XR VELC CLSR POLY STRP

## (undated) DEVICE — CANISTER, RIGID, 1200CC: Brand: MEDLINE INDUSTRIES, INC.

## (undated) DEVICE — 1010 S-DRAPE TOWEL DRAPE 10/BX: Brand: STERI-DRAPE™

## (undated) DEVICE — GLOVE SURG SZ 7.5 L11.73IN FNGR THK9.8MIL STRW LTX POLYMER

## (undated) DEVICE — GOWN SIRUS NONREIN XL W/TWL: Brand: MEDLINE INDUSTRIES, INC.

## (undated) DEVICE — GUIDEWIRE URO L150CM DIA0.035IN PTFE NIT HYDRPHLC STR TIP

## (undated) DEVICE — SYRINGE MED 10ML LUERLOCK TIP W/O SFTY DISP

## (undated) DEVICE — SHEET,DRAPE,53X77,STERILE: Brand: MEDLINE

## (undated) DEVICE — SOLUTION PREP POVIDONE IOD FOR SKIN MUCOUS MEM PRIOR TO

## (undated) DEVICE — NEEDLE HYPO 18GA L1.5IN THN WALL PIVOTING SHLD BVL ORIENTED

## (undated) DEVICE — SYRINGE IRRIG 60ML SFT PLIABLE BLB EZ TO GRP 1 HND USE W/

## (undated) DEVICE — GLOVE SURG SZ 85 L12IN FNGR THK13MIL WHT ISOLEX POLYISOPRENE

## (undated) DEVICE — SYRINGE MED 3ML CLR PLAS STD N CTRL LUERLOCK TIP DISP

## (undated) DEVICE — PAD,NON-ADHERENT,3X8,STERILE,LF,1/PK: Brand: MEDLINE

## (undated) DEVICE — TOTAL BASIC: Brand: MEDLINE INDUSTRIES, INC.

## (undated) DEVICE — SYSTEM SKIN CLSR 22CM DERMBND PRINEO

## (undated) DEVICE — BASIC SINGLE BASIN 1-LF: Brand: MEDLINE INDUSTRIES, INC.

## (undated) DEVICE — SOLUTION IV IRRIG POUR BRL 0.9% SODIUM CHL 2F7124

## (undated) DEVICE — SOLUTION IV IRRIG WATER 1000ML POUR BRL 2F7114

## (undated) DEVICE — PADDING UNDERCAST W4INXL4YD 100% COT CRIMPED FINISH WBRL II

## (undated) DEVICE — CYSTOSCOPY: Brand: MEDLINE INDUSTRIES, INC.

## (undated) DEVICE — 3M™ COBAN™ NL STERILE NON-LATEX SELF-ADHERENT WRAP, 2083S, 3 IN X 5 YD (7,5 CM X 4,5 M), 24 ROLLS/CASE: Brand: 3M™ COBAN™

## (undated) DEVICE — SUTURE ABSORBABLE MONOFILAMENT 1-0 OS8 14 IN STRATAFIX SPRL SXPD2B202

## (undated) DEVICE — 6619 2 PTNT ISO SYS INCISE AREA&LT;(&GT;&&LT;)&GT;P: Brand: STERI-DRAPE™ IOBAN™ 2

## (undated) DEVICE — KIT POS FOAM HANA TBL

## (undated) DEVICE — SUTURE STRATAFIX SPRL SZ 2 0 L14IN ABSRB UD MH L36MM 1 2 CIR SXMD2B401

## (undated) DEVICE — PAD DRY FLOOR ABS 32X58IN GRN